# Patient Record
Sex: FEMALE | Race: WHITE | Employment: FULL TIME | URBAN - METROPOLITAN AREA
[De-identification: names, ages, dates, MRNs, and addresses within clinical notes are randomized per-mention and may not be internally consistent; named-entity substitution may affect disease eponyms.]

---

## 2017-01-13 ENCOUNTER — OFFICE VISIT (OUTPATIENT)
Dept: ENDOCRINOLOGY | Age: 52
End: 2017-01-13

## 2017-01-13 VITALS
HEIGHT: 66 IN | HEART RATE: 79 BPM | WEIGHT: 171.2 LBS | BODY MASS INDEX: 27.51 KG/M2 | DIASTOLIC BLOOD PRESSURE: 76 MMHG | SYSTOLIC BLOOD PRESSURE: 110 MMHG

## 2017-01-13 DIAGNOSIS — E89.0 POSTABLATIVE HYPOTHYROIDISM: Primary | ICD-10-CM

## 2017-01-13 NOTE — PROGRESS NOTES
Chief Complaint   Patient presents with    Thyroid Problem     pcp and pharmacy confirmed     History of Present Illness: Peng Abbasi is a 46 y.o. female here for follow up of thyroid. Weight down 3 lbs since last visit in 7/16. Has still been getting hormone pellets with estrogen and testosterone every 3 months and last was on 12/28/16 along with daily progesterone. Has felt better on treatment. Takes the levoxyl first thing in the morning with just water and waits at least 30 minutes before eating. Vitamins are about 3 hours later. Hasn't missed any doses or run out. Some cold intolerance that is unchanged. Still has some constipation if she does not take 3 pills of senokot. Some dry skin that isn't any worse. Some hair loss that may be less. No brittle nails. Current Outpatient Prescriptions   Medication Sig    LACTOBACILLUS ACIDOPHILUS (PROBIOTIC PO) Take  by mouth.  MULTIVITAMIN PO Take  by mouth.  progesterone (PROMETRIUM) 200 mg capsule Take 200 mg by mouth daily.  LEVOXYL 112 mcg tablet Take 1 Tab by mouth Daily (before breakfast).  biotin 2,500 mcg tab Take 5,000 mg by mouth daily.  cholecalciferol, vitamin D3, 2,000 unit tab Take  by mouth daily.  Calcium-Cholecalciferol, D3, 600 mg(1,500mg) -400 unit chew Take  by mouth nightly. Pt takes 1 capsule daily    sennosides 8.6 mg cap Take  by mouth. Takes 3 daily  Indications: Constipation     No current facility-administered medications for this visit. Allergies   Allergen Reactions    Erythromycin Rash    Keflex [Cephalexin] Rash     Review of Systems:  - Cardiovascular: no chest pain  - Neurological: no tremors  - Integumentary: skin is normal    Physical Examination:  Blood pressure 110/76, pulse 79, height 5' 5.5\" (1.664 m), weight 171 lb 3.2 oz (77.7 kg).   - General: pleasant, no distress, good eye contact   - Neck: small thyroid goiter  - Cardiovascular: regular, normal rate, nl s1 and s2, no m/r/g - Integumentary: skin is normal, no edema  - Neurological: reflexes 2+ at biceps, no tremors  - Psychiatric: normal mood and affect    Data Reviewed:   Component      Latest Ref Rng & Units 1/12/2017 1/12/2017           3:35 PM  3:35 PM   TSH      0.450 - 4.500 uIU/mL  1.280   T4, Free      0.82 - 1.77 ng/dL 1.79 (H)        Assessment/Plan:     1. Postablative hypothyroidism: Was diagnosed with hyperthyroidism in 2009 and had a thyroid uptake scan that showed 20% uptake in 10/10 but her levels were just watched without any treatment and then in 2014 was referred to Dr. Preet Tapia and had a thyroid uptake scan that showed 64% uptake in 3/14 and was referred for [facility] and received 16 mCi on 3/28/14 and then developed severe constipation in 5/14 and was started on treatment with levothyroxine. Her dose was increased to as much as 112 mcg daily and was on this dose until Oct 2015 and apparently her last PCP, Dr. Yarely Katz, mani blood that showed this level was too much so she was decreased to 100 mcg daily. TSH was 3.7 and a FT4 of 1.97 in 12/15 and her dose was kept the same. Switched to [employer] on 4/1/16 and her TSH was 4.9 and FT4 was 1.6. I'm concerned some of the variability in her levels is due to generic so switched to Levoxyl at the same dose of 100 mcg daily and TSH was 4.94 in 5/16 so increased her dose to 112 mcg daily but she has been using up the 100 mcg tabs taking 8/wk and TSH 1.29 in 7/16. I kept her dose the same and repeated labs in 6-8 weeks and her TSH was 2.45 in 8/16 and kept this the same and still 1.28 in 1/17 and feels well so will see annually at this point. - cont levoxyl 112 mcg daily  - check TSH and free T4 and total T3 prior to next visit         Patient Instructions   1) Your TSH (thyroid test) is at goal between 0.5-2.0 so your dose of levoxyl looks good.     2) I will mail you a lab slip about 3-4 weeks before your next visit to have your labs repeated 3-4 days prior to your next visit. 3) I will plan on seeing you back in one year but if you feel you need to have your labs checked sooner, please let me know. Follow-up Disposition:  Return in about 1 year (around 1/13/2018). Copy sent to:  Dr. Osmin Pozo via [facility]  Dr. Denisse Jesus follow up: 5/23/17  Component      Latest Ref Rng & Units 5/19/2017 5/19/2017           3:16 PM  3:16 PM   T4, Free      0.82 - 1.77 ng/dL  1.91 (H)   TSH      0.450 - 4.500 uIU/mL 0.320 (L)      Sent her the following message through [employer]:  TSH is a thyroid test.  Your level is 0.32 which is now slightly low and below goal of 0.5-2.0 and your free T4 is slightly high at 1.91 with increasing your dose of levoxyl from 112 to 125 mcg daily. This test goes opposite of your thyroid dose and suggests your dose of levoxyl is now slightly more than you need. To get an in-between dose,  I will decrease your dose to 1 tab on Mon-Sat and 1/2 tab on Sunday only. I updated your med list but did not send  a new prescription to your local pharmacy. As long as you are feeling better on the new dose, you won't need your labs repeated prior to next visit but if you would like them checked again in the next few months, just let me know. Addendum: 8/10/17  Received labs from [id number] [facility] drawn 7/18/17:  - TSH 0.65  - Free T3 2.7 (2.0-4.4)    Sent her the following message through [employer]:  I received the fax you sent with your lab results from last month at [id number] [facility] [place]. Your TSH was back to normal at 0.65 and at goal of 0.5-2.0 so your current dose of levoxyl seems to be working well. To clarify your question that you asked in your fax, I was saying your levels can fluctuate more while on generic levothyroxine and Levoxyl is a brand name drug so I was hoping this would not cause your levels to fluctuate as much.   I still think some of the fluctuation in your levels are due to the differences in your estrogen levels depending on how closer or far away you are from your pellet insertion. It's unclear if your fatigue is from the thyroid or from your changes in estrogen levels. Some patients feel better changing to a natural thyroid hormone called armour thyroid that has both T4 and T3 in one pill. Our bodies convert T4 to T3 which is the active thyroid hormone and the levoxyl only has T4 in it. There are 15% of the population that doesn't convert T4 to T3 as well and you may be one of these people that would feel better having some T3 in your system. If you would like to try armour in place of the levoxyl, let me know. Otherwise, we can just stay on the levoxyl for now since your levels are normal at this time. Addendum: 8/11/17  We had the following e-mail exchange: Thanks for your response. Why don't we give the armour thyroid a try as I have had some good success with this. To get an equivalent dose of armour you will need to take 60 mg tab 1 tab on Mon-Sat and 2 tabs on Sun. This will be ready for  at the pharmacy today. I would like for you to try this for the next 6 weeks and then go and have your labs repeated. I put a lab order in the system so you can go directly to [facility] in 6 weeks and I'll e-mail you with the results to see how this dose is working. I will let you know I will be out of the office starting this evening and won't be back until 8/28/17 so if you write back, I'll be in touch when I get back. My partners will be covering for me while I am away if you need anything while I'm gone. Take care.       ===View-only below this line===      ----- Message -----     From: Windy Valenzuela     Sent: 8/11/2017 10:57 AM EDT       To: Ruben Elise MD  Subject: RE:your fax re lab results from [facility]    Thank you for your reply. I really would like your help in making this decision. When I went to renew they inquired about my thyroid condition.  They stated they could also treat my thyroid, but I told them I did not want to do that. I ask if they would continue to monitor my levels through labs though. They stated if they were to treat me they would not use synthetic drugs, but would use natural thyroid drugs. Therefore, maybe what they are doing is affecting what you are trying to do and vice versa. I am not opposed to trying the natural thyroid drug, if I have your blessing. Also, I am not opposed to finishing the Levoxyl I have. My goal is to feel better. I will await your decision. Lab follow up: 10/14/17  Component      Latest Ref Rng & Units 10/12/2017 10/12/2017 10/12/2017           2:33 PM  2:33 PM  2:33 PM   T4, Free      0.82 - 1.77 ng/dL   0.81 (L)   TSH      0.450 - 4.500 uIU/mL  5.450 (H)    T3, total      71 - 180 ng/dL 115       Sent her the following message through [employer]:  TSH is a thyroid test.  Your level is 5.45 which is slightly high and above goal of 0.5-2.0. This test goes opposite of your thyroid dose and suggests your dose of armour thyroid is not enough. How have you been feeling on the armour? Better, worse, or the same as the levoxyl? I'm happy to increase the armour to get your TSH back down but wasn't sure if we should go back to the levoxyl as previously your level was normal in August on this regimen. Let me know when you have a chance. Addendum: 10/17/17    We had the following e-mail exchange:    It's possible that some of the fluctuation in your thyroid hormone levels are due to fluctuations in your estrogen levels based on when you get your pellet insertions but also due to some variability in the armour thyroid versus the brand levoxyl. Since you are feeling better on the natural thyroid hormone, I will increase your dose to 1 tab 5 days a week and 2 tabs on Sundays and Wednesdays (or Thursdays, you choose) until you come back to see me.   I put another lab order in the system and you should go and repeat your labs the week before your visit in January.  ===View-only below this line===      ----- Message -----     From: Katie Souza     Sent: 10/16/2017 10:03 AM EDT       To: Darrick Garcia MD  Subject: RE:lab results    I have been feeling better, I have been more tired in the last couple weeks, but overall I think the change has been good for me. I would prefer to stay on the [place] and have you adjust my dose. Although I am having a hard time understanding why I will have a normal lab and then all of sudden I have an abnormal lab. I believe if you look at my history as long as I have been taking thyroid drugs in general, this is not new. My levels seem to be high or low more often than normal. Could my thyroid be functioning some and be the cause of not being able to regulate these meds? It just seems to me I will be normal and the next time I have labs my level is off again either high or low and I not sure what could be causing this.

## 2017-01-13 NOTE — MR AVS SNAPSHOT
Visit Information Date & Time Provider Department Dept. Phone Encounter #  
 1/13/2017  2:50 PM Morgan Jorge [facility] [id number] Follow-up Instructions Return in about 1 year (around 1/13/2018). Upcoming Health Maintenance Date Due Hepatitis C Screening 1965 Pneumococcal 19-64 Medium Risk (1 of 1 - PPSV23) 12/9/1982 DTaP/Tdap/Td series (1 - Tdap) 12/9/1984 PAP AKA CERVICAL CYTOLOGY 12/9/1984 FOBT Q 1 YEAR AGE 50-75 12/9/2013 INFLUENZA AGE 9 TO ADULT 8/1/2016 BREAST CANCER SCRN MAMMOGRAM 8/4/2017 Allergies as of 1/13/2017  Review Complete On: 1/13/2017 By: Boston Reed MD  
  
 Severity Noted Reaction Type Reactions Erythromycin High 04/01/2016   Systemic Rash Keflex [Cephalexin] High 04/01/2016   Systemic Rash Current Immunizations  Never Reviewed No immunizations on file. Not reviewed this visit You Were Diagnosed With   
  
 Codes Comments Postablative hypothyroidism    -  Primary ICD-10-CM: E89.0 ICD-9-CM: 244.1 Vitals BP Pulse Height(growth percentile) Weight(growth percentile) BMI OB Status 110/76 (BP 1 Location: Left arm, BP Patient Position: Sitting) 79 5' 5.5\" (1.664 m) 171 lb 3.2 oz (77.7 kg) 28.06 kg/m2 Postmenopausal  
 Smoking Status Current Every Day Smoker Vitals History BMI and BSA Data Body Mass Index Body Surface Area 28.06 kg/m 2 1.89 m 2 Preferred Pharmacy Pharmacy Name Phone [facility] [address] [phone] Your Updated Medication List  
  
   
This list is accurate as of: 1/13/17  4:02 PM.  Always use your most recent med list.  
  
  
  
  
 biotin 2,500 mcg Tab Take 5,000 mg by mouth daily. Calcium-Cholecalciferol (D3) 600 mg(1,500mg) -400 unit Chew Take  by mouth nightly. Pt takes 1 capsule daily cholecalciferol (vitamin D3) 2,000 unit Tab Take  by mouth daily. LEVOXYL 112 mcg tablet Generic drug:  levothyroxine Take 1 Tab by mouth Daily (before breakfast). MULTIVITAMIN PO Take  by mouth. PROBIOTIC PO Take  by mouth.  
  
 progesterone 200 mg capsule Commonly known as:  PROMETRIUM Take 200 mg by mouth daily. sennosides 8.6 mg Cap Take  by mouth. Takes 3 daily  Indications: Constipation Follow-up Instructions Return in about 1 year (around 1/13/2018). Patient Instructions 1) Your TSH (thyroid test) is at goal between 0.5-2.0 so your dose of levoxyl looks good. 2) I will mail you a lab slip about 3-4 weeks before your next visit to have your labs repeated 3-4 days prior to your next visit. 3) I will plan on seeing you back in one year but if you feel you need to have your labs checked sooner, please let me know. Introducing [facility]! Dear Leanne El: Thank you for requesting a [employer] account. Our records indicate that you already have an active [employer] account. You can access your account anytime at [email or web address] Did you know that you can access your hospital and ER discharge instructions at any time in [employer]? You can also review all of your test results from your hospital stay or ER visit. Additional Information If you have questions, please visit the Frequently Asked Questions section of the [employer] website at [email or web address] Remember, [employer] is NOT to be used for urgent needs. For medical emergencies, dial 911. Now available from your iPhone and Android! Please provide this summary of care documentation to your next provider. Your primary care clinician is listed as [facility]. If you have any questions after today's visit, please call [phone].

## 2017-01-13 NOTE — PATIENT INSTRUCTIONS
1) Your TSH (thyroid test) is at goal between 0.5-2.0 so your dose of levoxyl looks good. 2) I will mail you a lab slip about 3-4 weeks before your next visit to have your labs repeated 3-4 days prior to your next visit. 3) I will plan on seeing you back in one year but if you feel you need to have your labs checked sooner, please let me know.

## 2017-03-21 LAB — PAP SMEAR, EXTERNAL: NORMAL

## 2017-03-23 ENCOUNTER — OFFICE VISIT (OUTPATIENT)
Dept: FAMILY MEDICINE CLINIC | Age: 52
End: 2017-03-23

## 2017-03-23 VITALS
TEMPERATURE: 98 F | OXYGEN SATURATION: 97 % | BODY MASS INDEX: 27.86 KG/M2 | DIASTOLIC BLOOD PRESSURE: 61 MMHG | HEART RATE: 61 BPM | SYSTOLIC BLOOD PRESSURE: 97 MMHG | WEIGHT: 170 LBS

## 2017-03-23 DIAGNOSIS — Z11.59 NEED FOR HEPATITIS C SCREENING TEST: ICD-10-CM

## 2017-03-23 DIAGNOSIS — Z23 ENCOUNTER FOR IMMUNIZATION: Primary | ICD-10-CM

## 2017-03-23 DIAGNOSIS — Z00.00 ANNUAL PHYSICAL EXAM: ICD-10-CM

## 2017-03-23 DIAGNOSIS — E03.9 HYPOTHYROIDISM, UNSPECIFIED TYPE: ICD-10-CM

## 2017-03-23 NOTE — PROGRESS NOTES
Daniel Brooke 46 y.o. Daniel Brooke female presents for annual exam.      Screening:   Colon Cancer Screening:  Colonoscopy with Dr. Nicki Adams age 46  Pap: March 2017, [address] [facility] with Dr. Jones Ray:  2015, due in August, scheduled   April 11, 2017  GYN:  Dr. Yareli Story, [address] [facility]    Chronic issues:   has a past medical history of Postablative hypothyroidism (3/28/14) and Seasonal allergic rhinitis. Immunizations:   [place]  TdaP: due  PNA:  due    Diet: no regular diet    Patient states she is extremely fatigued and has cold intolerance. Sees endocrine for post ablative hypothyroid. Takes Levoxyl 112 mcg daily. Allergies   Allergen Reactions    Erythromycin Rash    Keflex [Cephalexin] Rash       Current Outpatient Prescriptions:     LACTOBACILLUS ACIDOPHILUS (PROBIOTIC PO), Take  by mouth., Disp: , Rfl:     MULTIVITAMIN PO, Take  by mouth., Disp: , Rfl:     progesterone (PROMETRIUM) 200 mg capsule, Take 200 mg by mouth daily. , Disp: , Rfl:     LEVOXYL 112 mcg tablet, Take 1 Tab by mouth Daily (before breakfast). , Disp: 30 Tab, Rfl: 11    biotin 2,500 mcg tab, Take 5,000 mg by mouth daily. , Disp: , Rfl:     cholecalciferol, vitamin D3, 2,000 unit tab, Take  by mouth daily. , Disp: , Rfl:     Calcium-Cholecalciferol, D3, 600 mg(1,500mg) -400 unit chew, Take  by mouth nightly. Pt takes 1 capsule daily, Disp: , Rfl:     sennosides 8.6 mg cap, Take  by mouth. Takes 3 daily  Indications: Constipation, Disp: , Rfl:       ROS  Gen: denies fever, chills, fatigue, weight loss, weight gain  Eyes: denies blurry vision  Nose: denies nasal congestion  Mouth: denies sore throat  Resp: denies dypsnea, cough, wheezing  CV: denies chest pain, palpitations, lower extremity edema  Abd: denies nausea, vomiting, diarrhea, endorses constipation. No tarry black stools or blood in stools.   Neuro: denies numbness/tingling  Endo: denies polyuria, polydipsia, endorses cold intolerance      Visit Vitals    BP 97/61    Pulse 61  Temp 98 °F (36.7 °C)    Wt 170 lb (77.1 kg)    SpO2 97%    BMI 27.86 kg/m2     Gen: alert, oriented, no acute distress  Head: NCAT  Eyes: PERRLA, sclera clear, conjunctiva clear  Nose: normal turbinates, no rhinorrhea, no sinus tenderness  Oral: moist mucus membranes, no oral lesions, no pharyngeal inflammation or exudate  Neck: supple, midline trachea, no retractions. Thyroid WNL. Resp: Lungs CTAB, no wheezing, rales or rhonchi  CV: Normal rhythm, normal S1/S2, no m/r/g. No LE edema  Abd: soft, not tender, not distended. No hepatosplenomegaly. Normal bowel sounds. Assessment/ Plan:   Daisy Ferguson was seen today for annual wellness visit. Diagnoses and all orders for this visit:    Encounter for immunization  -     TETANUS, DIPHTHERIA TOXOIDS AND ACELLULAR PERTUSSIS VACCINE (TDAP), IN INDIVIDS. >=7, IM  -     Discontinue: pneumococcal 23-valent (PNEUMOVAX 23) 25 mcg/0.5 mL injection; 0.5 mL by IntraMUSCular route once for 1 dose. -     PNEUMOCOCCAL POLYSACCHARIDE VACCINE, 23-VALENT, ADULT OR IMMUNOSUPPRESSED PT DOSE,    Hypothyroidism, unspecified type  -     TSH 3RD GENERATION  -     T4, FREE    Annual physical exam  -     LIPID PANEL  -     METABOLIC PANEL, COMPREHENSIVE    Need for hepatitis C screening test  -     HEPATITIS C AB    Will get records for [facility] [address] and [facility] to see what kind of implants she is getting for natural hormone replacement. Health Maintenance reviewed - tetanus booster given, Pneumovax given. Advised patient to quit smoking. Verbal and written instructions (see AVS) provided.  Patient expresses understanding of diagnosis and treatment plan. Padmini Gaytan.  Gerhard Goodman MD

## 2017-03-23 NOTE — MR AVS SNAPSHOT
Visit Information Date & Time Provider Department Dept. Phone Encounter #  
 3/23/2017  2:45 PM Concetta Yanez MD [address] [facility] 419 [phone] [id number] Your Appointments 1/12/2018  3:10 PM  
Follow Up with MD [facility] [address] [facility]) Appt Note: 1 year f/u   Hypothyroidism [address] [id number] [address] [facility] Upcoming Health Maintenance Date Due Hepatitis C Screening 1965 Pneumococcal 19-64 Medium Risk (1 of 1 - PPSV23) 12/9/1982 DTaP/Tdap/Td series (1 - Tdap) 12/9/1984 PAP AKA CERVICAL CYTOLOGY 12/9/1984 FOBT Q 1 YEAR AGE 50-75 12/9/2013 BREAST CANCER SCRN MAMMOGRAM 8/4/2017 Allergies as of 3/23/2017  Review Complete On: 3/23/2017 By: Concetta Yanez MD  
  
 Severity Noted Reaction Type Reactions Erythromycin High 04/01/2016   Systemic Rash Keflex [Cephalexin] High 04/01/2016   Systemic Rash Current Immunizations  Never Reviewed Name Date Tdap  Incomplete Not reviewed this visit You Were Diagnosed With   
  
 Codes Comments Encounter for immunization    -  Primary ICD-10-CM: B19 ICD-9-CM: V03.89 Hypothyroidism, unspecified type     ICD-10-CM: E03.9 ICD-9-CM: 244.9 Annual physical exam     ICD-10-CM: Z00.00 ICD-9-CM: V70.0 Need for hepatitis C screening test     ICD-10-CM: Z11.59 
ICD-9-CM: V73.89 Vitals BP Pulse Temp Weight(growth percentile) SpO2 BMI  
 97/61 61 98 °F (36.7 °C) 170 lb (77.1 kg) 97% 27.86 kg/m2 OB Status Smoking Status Postmenopausal Current Every Day Smoker BMI and BSA Data Body Mass Index Body Surface Area  
 27.86 kg/m 2 1.89 m 2 Preferred Pharmacy Pharmacy Name Phone  [facility] [address] [facility] [phone] Your Updated Medication List  
  
   
This list is accurate as of: 3/23/17  3:14 PM.  Always use your most recent med list.  
  
  
  
  
 biotin 2,500 mcg Tab Take 5,000 mg by mouth daily. Calcium-Cholecalciferol (D3) 600 mg(1,500mg) -400 unit Chew Take  by mouth nightly. Pt takes 1 capsule daily  
  
 cholecalciferol (vitamin D3) 2,000 unit Tab Take  by mouth daily. LEVOXYL 112 mcg tablet Generic drug:  levothyroxine Take 1 Tab by mouth Daily (before breakfast). MULTIVITAMIN PO Take  by mouth.  
  
 pneumococcal 23-valent 25 mcg/0.5 mL injection Commonly known as:  PNEUMOVAX 23  
0.5 mL by IntraMUSCular route once for 1 dose. PROBIOTIC PO Take  by mouth.  
  
 progesterone 200 mg capsule Commonly known as:  PROMETRIUM Take 200 mg by mouth daily. sennosides 8.6 mg Cap Take  by mouth. Takes 3 daily  Indications: Constipation Prescriptions Sent to Pharmacy Refills  
 pneumococcal 23-valent (PNEUMOVAX 23) 25 mcg/0.5 mL injection 0 Si.5 mL by IntraMUSCular route once for 1 dose. Class: Normal  
 Pharmacy: Merged with [facility], [address] [place] [address] #: [phone] Route: IntraMUSCular We Performed the Following HEPATITIS C AB [99131 CPT(R)] LIPID PANEL [79515 CPT(R)] METABOLIC PANEL, COMPREHENSIVE [25589 CPT(R)] T4, FREE [id number] CPT(R)] TETANUS, DIPHTHERIA TOXOIDS AND ACELLULAR PERTUSSIS VACCINE (TDAP), IN INDIVIDS. >=7, IM [id number] CPT(R)] TSH 3RD GENERATION [57327 CPT(R)] Patient Instructions Tdap (Tetanus, Diphtheria, Pertussis) Vaccine: What You Need to Know Why get vaccinated? Tetanus, diphtheria, and pertussis are very serious diseases. Tdap vaccine can protect us from these diseases. And Tdap vaccine given to pregnant women can protect  babies against pertussis. Tetanus (lockjaw) is rare in the [facility] today. It causes painful muscle tightening and stiffness, usually all over the body. · It can lead to tightening of muscles in the head and neck so you can't open your mouth, swallow, or sometimes even breathe. Tetanus kills about 1 out of 10 people who are infected even after receiving the best medical care. Diphtheria is also rare in the [place] today. It can cause a thick coating to form in the back of the throat. · It can lead to breathing problems, heart failure, paralysis, and death. Pertussis (whooping cough) causes severe coughing spells, which can cause difficulty breathing, vomiting, and disturbed sleep. · It can also lead to weight loss, incontinence, and rib fractures. Up to 2 in 100 adolescents and 5 in 100 adults with pertussis are hospitalized or have complications, which could include pneumonia or death. These diseases are caused by bacteria. Diphtheria and pertussis are spread from person to person through secretions from coughing or sneezing. Tetanus enters the body through cuts, scratches, or wounds. Before vaccines, as many as 200,000 cases of diphtheria, 200,000 cases of pertussis, and hundreds of cases of tetanus were reported in the [place] each year. Since vaccination began, reports of cases for tetanus and diphtheria have dropped by about 99% and for pertussis by about 80%. Tdap vaccine The Tdap vaccine can protect adolescents and adults from tetanus, diphtheria, and pertussis. One dose of Tdap is routinely given at age 6 or 15. People who did not get Tdap at that age should get it as soon as possible. Tdap is especially important for health care professionals and anyone having close contact with a baby younger than 12 months. Pregnant women should get a dose of Tdap during every pregnancy, to protect the  from pertussis. Infants are most at risk for severe, life-threatening complications from pertussis. Another vaccine, called Td, protects against tetanus and diphtheria, but not pertussis. A Td booster should be given every 10 years. Tdap may be given as one of these boosters if you have never gotten Tdap before. Tdap may also be given after a severe cut or burn to prevent tetanus infection. Your doctor or the person giving you the vaccine can give you more information. Tdap may safely be given at the same time as other vaccines. Some people should not get this vaccine · A person who has ever had a life-threatening allergic reaction after a previous dose of any diphtheria-, tetanus-, or pertussis-containing vaccine, OR has a severe allergy to any part of this vaccine, should not get Tdap vaccine. Tell the person giving the vaccine about any severe allergies. · Anyone who had coma or long repeated seizures within 7 days after a childhood dose of DTP or DTaP, or a previous dose of Tdap, should not get Tdap, unless a cause other than the vaccine was found. They can still get Td. · Talk to your doctor if you: 
¨ Have seizures or another nervous system problem. ¨ Had severe pain or swelling after any vaccine containing diphtheria, tetanus, or pertussis. ¨ Ever had a condition called Guillain-Barré Syndrome (GBS). ¨ Aren't feeling well on the day the shot is scheduled. Risks With any medicine, including vaccines, there is a chance of side effects. These are usually mild and go away on their own. Serious reactions are also possible but are rare. Most people who get Tdap vaccine do not have any problems with it. Mild problems following Tdap 
(Did not interfere with activities) · Pain where the shot was given (about 3 in 4 adolescents or 2 in 3 adults) · Redness or swelling where the shot was given (about 1 person in 5) · Mild fever of at least 100.4°F (up to about 1 in 25 adolescents or 1 in 100 adults) · Headache (about 3 or 4 people in 10) · Tiredness (about 1 person in 3 or 4) · Nausea, vomiting, diarrhea, stomachache (up to 1 in 4 adolescents or 1 in 10 adults) · Chills, sore joints (about 1 person in 10) · Body aches (about 1 person in 3 or 4) · Rash, swollen glands (uncommon) Moderate problems following Tdap (Interfered with activities, but did not require medical attention) · Pain where the shot was given (up to 1 in 5 or 6) · Redness or swelling where the shot was given (up to about 1 in 16 adolescents or 1 in 12 adults) · Fever over 102°F (about 1 in 100 adolescents or 1 in 250 adults) · Headache (about 1 in 7 adolescents or 1 in 10 adults) · Nausea, vomiting, diarrhea, stomachache (up to 1 to 3 people in 100) · Swelling of the entire arm where the shot was given (up to about 1 in 500) Severe problems following Tdap 
(Unable to perform usual activities; required medical attention) · Swelling, severe pain, bleeding and redness in the arm where the shot was given (rare) Problems that could happen after any vaccine: · People sometimes faint after a medical procedure, including vaccination. Sitting or lying down for about 15 minutes can help prevent fainting, and injuries caused by a fall. Tell your doctor if you feel dizzy or have vision changes or ringing in the ears. · Some people get severe pain in the shoulder and have difficulty moving the arm where a shot was given. This happens very rarely. · Any medication can cause a severe allergic reaction. Such reactions from a vaccine are very rare, estimated at fewer than 1 in a million doses, and would happen within a few minutes to a few hours after the vaccination. As with any medicine, there is a very remote chance of a vaccine causing a serious injury or death. The safety of vaccines is always being monitored. For more information, visit: www.cdc.gov/vaccinesafety. What if there is a serious problem? What should I look for?  
· Look for anything that concerns you, such as signs of a severe allergic reaction, very high fever, or unusual behavior. Signs of a severe allergic reaction can include hives, swelling of the face and throat, difficulty breathing, a fast heartbeat, dizziness, and weakness. These would usually start a few minutes to a few hours after the vaccination. What should I do? · If you think it is a severe allergic reaction or other emergency that can't wait, call 9-1-1 or get the person to the nearest hospital. Otherwise, call your doctor. · Afterward, the reaction should be reported to the Vaccine Adverse Event Reporting System (VAERS). Your doctor might file this report, or you can do it yourself through the VAERS web site at www.vaers. [facility].gov, or by calling 9-451.681.2328. VAERS does not give medical advice. The National Vaccine Injury Compensation Program 
The National Vaccine Injury Compensation Program (VICP) is a federal program that was created to compensate people who may have been injured by certain vaccines. Persons who believe they may have been injured by a vaccine can learn about the program and about filing a claim by calling 1-411.166.7313 or visiting the [employer] website at www.[facility].gov/vaccinecompensation. There is a time limit to file a claim for compensation. How can I learn more? · Ask your doctor. He or she can give you the vaccine package insert or suggest other sources of information. · Call your local or state health department. · Contact the Centers for Disease Control and Prevention (CDC): 
¨ Call 5-123.152.5937 (1-800-CDC-INFO) or ¨ Visit CDC's website at www.cdc.gov/vaccines Vaccine Information Statement (Interim) Tdap Vaccine 
(2/24/15) [address] Manpreet Livier [id number] Department of [facility] and [employer] Centers for Disease Control and Prevention Many Vaccine Information Statements are available in [place] and other languages. See www.immunize.org/vis.  
Muchas hojas de información sobre vacunas están disponibles en español y en otros idiomas. Visite www.immunize.org/vis. Care instructions adapted under license by your healthcare professional. If you have questions about a medical condition or this instruction, always ask your healthcare professional. Judi Gresham any warranty or liability for your use of this information. Well Visit, Women 48 to 72: Care Instructions Your Care Instructions Physical exams can help you stay healthy. Your doctor has checked your overall health and may have suggested ways to take good care of yourself. He or she also may have recommended tests. At home, you can help prevent illness with healthy eating, regular exercise, and other steps. Follow-up care is a key part of your treatment and safety. Be sure to make and go to all appointments, and call your doctor if you are having problems. It's also a good idea to know your test results and keep a list of the medicines you take. How can you care for yourself at home? · Reach and stay at a healthy weight. This will lower your risk for many problems, such as obesity, diabetes, heart disease, and high blood pressure. · Get at least 30 minutes of exercise on most days of the week. Walking is a good choice. You also may want to do other activities, such as running, swimming, cycling, or playing tennis or team sports. · Do not smoke. Smoking can make health problems worse. If you need help quitting, talk to your doctor about stop-smoking programs and medicines. These can increase your chances of quitting for good. · Protect your skin from too much sun. When you're outdoors from 10 a.m. to 4 p.m., stay in the shade or cover up with clothing and a hat with a wide brim. Wear sunglasses that block UV rays. Even when it's cloudy, put broad-spectrum sunscreen (SPF 30 or higher) on any exposed skin. · See a dentist one or two times a year for checkups and to have your teeth cleaned. · Wear a seat belt in the car. · Limit alcohol to 1 drink a day. Too much alcohol can cause health problems. Follow your doctor's advice about when to have certain tests. These tests can spot problems early. · Cholesterol. Your doctor will tell you how often to have this done based on your age, family history, or other things that can increase your risk for heart attack and stroke. · Blood pressure. Have your blood pressure checked during a routine doctor visit. Your doctor will tell you how often to check your blood pressure based on your age, your blood pressure results, and other factors. · Mammogram. Ask your doctor how often you should have a mammogram, which is an X-ray of your breasts. A mammogram can spot breast cancer before it can be felt and when it is easiest to treat. · Pap test and pelvic exam. Ask your doctor how often you should have a Pap test. You may not need to have a Pap test as often as you used to. · Vision. Have your eyes checked every year or two or as often as your doctor suggests. Some experts recommend that you have yearly exams for glaucoma and other age-related eye problems starting at age 48. · Hearing. Tell your doctor if you notice any change in your hearing. You can have tests to find out how well you hear. · Diabetes. Ask your doctor whether you should have tests for diabetes. · Colon cancer. You should begin tests for colon cancer at age 48. You may have one of several tests. Your doctor will tell you how often to have tests based on your age and risk. Risks include whether you already had a precancerous polyp removed from your colon or whether your parents, sisters and brothers, or children have had colon cancer. · Thyroid disease. Talk to your doctor about whether to have your thyroid checked as part of a regular physical exam. Women have an increased chance of a thyroid problem. · Osteoporosis. You should begin tests for bone density at age 72.  If you are younger than 72, ask your doctor whether you have factors that may increase your risk for this disease. You may want to have this test before age 72. · Heart attack and stroke risk. At least every 4 to 6 years, you should have your risk for heart attack and stroke assessed. Your doctor uses factors such as your age, blood pressure, cholesterol, and whether you smoke or have diabetes to show what your risk for a heart attack or stroke is over the next 10 years. When should you call for help? Watch closely for changes in your health, and be sure to contact your doctor if you have any problems or symptoms that concern you. Where can you learn more? Go to http://claudio-ajith.info/. Enter M392 in the search box to learn more about \"Well Visit, Women 50 to 72: Care Instructions. \" Current as of: July 19, 2016 Content Version: 11.1 © 5568-0949 [employer]. Care instructions adapted under license by [employer] (which disclaims liability or warranty for this information). If you have questions about a medical condition or this instruction, always ask your healthcare professional. [address] any warranty or liability for your use of this information. Introducing [facility]! Dear Yovana Villanueva: Thank you for requesting a [employer] account. Our records indicate that you already have an active [employer] account. You can access your account anytime at [email or web address] Did you know that you can access your hospital and ER discharge instructions at any time in [employer]? You can also review all of your test results from your hospital stay or ER visit. Additional Information If you have questions, please visit the Frequently Asked Questions section of the [employer] website at [email or web address] Remember, [employer] is NOT to be used for urgent needs. For medical emergencies, dial 911. Now available from your iPhone and Android! Please provide this summary of care documentation to your next provider. Your primary care clinician is listed as Idalmis Dixon. If you have any questions after today's visit, please call [phone].

## 2017-03-23 NOTE — PATIENT INSTRUCTIONS
Tdap (Tetanus, Diphtheria, Pertussis) Vaccine: What You Need to Know  Why get vaccinated? Tetanus, diphtheria, and pertussis are very serious diseases. Tdap vaccine can protect us from these diseases. And Tdap vaccine given to pregnant women can protect  babies against pertussis. Tetanus (lockjaw) is rare in the [facility] today. It causes painful muscle tightening and stiffness, usually all over the body. · It can lead to tightening of muscles in the head and neck so you can't open your mouth, swallow, or sometimes even breathe. Tetanus kills about 1 out of 10 people who are infected even after receiving the best medical care. Diphtheria is also rare in the [place] today. It can cause a thick coating to form in the back of the throat. · It can lead to breathing problems, heart failure, paralysis, and death. Pertussis (whooping cough) causes severe coughing spells, which can cause difficulty breathing, vomiting, and disturbed sleep. · It can also lead to weight loss, incontinence, and rib fractures. Up to 2 in 100 adolescents and 5 in 100 adults with pertussis are hospitalized or have complications, which could include pneumonia or death. These diseases are caused by bacteria. Diphtheria and pertussis are spread from person to person through secretions from coughing or sneezing. Tetanus enters the body through cuts, scratches, or wounds. Before vaccines, as many as 200,000 cases of diphtheria, 200,000 cases of pertussis, and hundreds of cases of tetanus were reported in the [place] each year. Since vaccination began, reports of cases for tetanus and diphtheria have dropped by about 99% and for pertussis by about 80%. Tdap vaccine  The Tdap vaccine can protect adolescents and adults from tetanus, diphtheria, and pertussis. One dose of Tdap is routinely given at age 6 or 15. People who did not get Tdap at that age should get it as soon as possible.   Tdap is especially important for health care professionals and anyone having close contact with a baby younger than 12 months. Pregnant women should get a dose of Tdap during every pregnancy, to protect the  from pertussis. Infants are most at risk for severe, life-threatening complications from pertussis. Another vaccine, called Td, protects against tetanus and diphtheria, but not pertussis. A Td booster should be given every 10 years. Tdap may be given as one of these boosters if you have never gotten Tdap before. Tdap may also be given after a severe cut or burn to prevent tetanus infection. Your doctor or the person giving you the vaccine can give you more information. Tdap may safely be given at the same time as other vaccines. Some people should not get this vaccine  · A person who has ever had a life-threatening allergic reaction after a previous dose of any diphtheria-, tetanus-, or pertussis-containing vaccine, OR has a severe allergy to any part of this vaccine, should not get Tdap vaccine. Tell the person giving the vaccine about any severe allergies. · Anyone who had coma or long repeated seizures within 7 days after a childhood dose of DTP or DTaP, or a previous dose of Tdap, should not get Tdap, unless a cause other than the vaccine was found. They can still get Td. · Talk to your doctor if you:  ¨ Have seizures or another nervous system problem. ¨ Had severe pain or swelling after any vaccine containing diphtheria, tetanus, or pertussis. ¨ Ever had a condition called Guillain-Barré Syndrome (GBS). ¨ Aren't feeling well on the day the shot is scheduled. Risks  With any medicine, including vaccines, there is a chance of side effects. These are usually mild and go away on their own. Serious reactions are also possible but are rare. Most people who get Tdap vaccine do not have any problems with it.   Mild problems following Tdap  (Did not interfere with activities)  · Pain where the shot was given (about 3 in 4 adolescents or 2 in 3 adults)  · Redness or swelling where the shot was given (about 1 person in 5)  · Mild fever of at least 100.4°F (up to about 1 in 25 adolescents or 1 in 100 adults)  · Headache (about 3 or 4 people in 10)  · Tiredness (about 1 person in 3 or 4)  · Nausea, vomiting, diarrhea, stomachache (up to 1 in 4 adolescents or 1 in 10 adults)  · Chills, sore joints (about 1 person in 10)  · Body aches (about 1 person in 3 or 4)  · Rash, swollen glands (uncommon)  Moderate problems following Tdap  (Interfered with activities, but did not require medical attention)  · Pain where the shot was given (up to 1 in 5 or 6)  · Redness or swelling where the shot was given (up to about 1 in 16 adolescents or 1 in 12 adults)  · Fever over 102°F (about 1 in 100 adolescents or 1 in 250 adults)  · Headache (about 1 in 7 adolescents or 1 in 10 adults)  · Nausea, vomiting, diarrhea, stomachache (up to 1 to 3 people in 100)  · Swelling of the entire arm where the shot was given (up to about 1 in 500)  Severe problems following Tdap  (Unable to perform usual activities; required medical attention)  · Swelling, severe pain, bleeding and redness in the arm where the shot was given (rare)  Problems that could happen after any vaccine:  · People sometimes faint after a medical procedure, including vaccination. Sitting or lying down for about 15 minutes can help prevent fainting, and injuries caused by a fall. Tell your doctor if you feel dizzy or have vision changes or ringing in the ears. · Some people get severe pain in the shoulder and have difficulty moving the arm where a shot was given. This happens very rarely. · Any medication can cause a severe allergic reaction. Such reactions from a vaccine are very rare, estimated at fewer than 1 in a million doses, and would happen within a few minutes to a few hours after the vaccination.   As with any medicine, there is a very remote chance of a vaccine causing a serious injury or death. The safety of vaccines is always being monitored. For more information, visit: www.cdc.gov/vaccinesafety. What if there is a serious problem? What should I look for? · Look for anything that concerns you, such as signs of a severe allergic reaction, very high fever, or unusual behavior. Signs of a severe allergic reaction can include hives, swelling of the face and throat, difficulty breathing, a fast heartbeat, dizziness, and weakness. These would usually start a few minutes to a few hours after the vaccination. What should I do? · If you think it is a severe allergic reaction or other emergency that can't wait, call 9-1-1 or get the person to the nearest hospital. Otherwise, call your doctor. · Afterward, the reaction should be reported to the Vaccine Adverse Event Reporting System (VAERS). Your doctor might file this report, or you can do it yourself through the VAERS web site at www.vaers. [facility].gov, or by calling 0-159.659.6262. VAERS does not give medical advice. The National Vaccine Injury Compensation Program  The National Vaccine Injury Compensation Program (VICP) is a federal program that was created to compensate people who may have been injured by certain vaccines. Persons who believe they may have been injured by a vaccine can learn about the program and about filing a claim by calling 7-558.845.5591 or visiting the [employer] website at www.[facility].gov/vaccinecompensation. There is a time limit to file a claim for compensation. How can I learn more? · Ask your doctor. He or she can give you the vaccine package insert or suggest other sources of information. · Call your local or state health department. · Contact the Centers for Disease Control and Prevention (CDC):  ¨ Call 0-761.159.7036 (1-800-CDC-INFO) or  ¨ Visit CDC's website at www.cdc.gov/vaccines  Vaccine Information Statement (Interim)  Tdap Vaccine  (2/24/15)  42 BRENDON Freed [id number]-71  Counts include 234 beds at the [facility] for Disease Control and Prevention  Many Vaccine Information Statements are available in [place]ian and other languages. See www.immunize.org/vis. Muchas hojas de información sobre vacunas están disponibles en español y en otros idiomas. Visite www.immunize.org/vis. Care instructions adapted under license by your healthcare professional. If you have questions about a medical condition or this instruction, always ask your healthcare professional. [place] [address] any warranty or liability for your use of this information. Well Visit, Women 48 to 72: Care Instructions  Your Care Instructions  Physical exams can help you stay healthy. Your doctor has checked your overall health and may have suggested ways to take good care of yourself. He or she also may have recommended tests. At home, you can help prevent illness with healthy eating, regular exercise, and other steps. Follow-up care is a key part of your treatment and safety. Be sure to make and go to all appointments, and call your doctor if you are having problems. It's also a good idea to know your test results and keep a list of the medicines you take. How can you care for yourself at home? · Reach and stay at a healthy weight. This will lower your risk for many problems, such as obesity, diabetes, heart disease, and high blood pressure. · Get at least 30 minutes of exercise on most days of the week. Walking is a good choice. You also may want to do other activities, such as running, swimming, cycling, or playing tennis or team sports. · Do not smoke. Smoking can make health problems worse. If you need help quitting, talk to your doctor about stop-smoking programs and medicines. These can increase your chances of quitting for good. · Protect your skin from too much sun. When you're outdoors from 10 a.m. to 4 p.m., stay in the shade or cover up with clothing and a hat with a wide brim. Wear sunglasses that block UV rays.  Even when it's cloudy, put broad-spectrum sunscreen (SPF 30 or higher) on any exposed skin. · See a dentist one or two times a year for checkups and to have your teeth cleaned. · Wear a seat belt in the car. · Limit alcohol to 1 drink a day. Too much alcohol can cause health problems. Follow your doctor's advice about when to have certain tests. These tests can spot problems early. · Cholesterol. Your doctor will tell you how often to have this done based on your age, family history, or other things that can increase your risk for heart attack and stroke. · Blood pressure. Have your blood pressure checked during a routine doctor visit. Your doctor will tell you how often to check your blood pressure based on your age, your blood pressure results, and other factors. · Mammogram. Ask your doctor how often you should have a mammogram, which is an X-ray of your breasts. A mammogram can spot breast cancer before it can be felt and when it is easiest to treat. · Pap test and pelvic exam. Ask your doctor how often you should have a Pap test. You may not need to have a Pap test as often as you used to. · Vision. Have your eyes checked every year or two or as often as your doctor suggests. Some experts recommend that you have yearly exams for glaucoma and other age-related eye problems starting at age 48. · Hearing. Tell your doctor if you notice any change in your hearing. You can have tests to find out how well you hear. · Diabetes. Ask your doctor whether you should have tests for diabetes. · Colon cancer. You should begin tests for colon cancer at age 48. You may have one of several tests. Your doctor will tell you how often to have tests based on your age and risk. Risks include whether you already had a precancerous polyp removed from your colon or whether your parents, sisters and brothers, or children have had colon cancer. · Thyroid disease.  Talk to your doctor about whether to have your thyroid checked as part of a regular physical exam. Women have an increased chance of a thyroid problem. · Osteoporosis. You should begin tests for bone density at age 72. If you are younger than 72, ask your doctor whether you have factors that may increase your risk for this disease. You may want to have this test before age 72. · Heart attack and stroke risk. At least every 4 to 6 years, you should have your risk for heart attack and stroke assessed. Your doctor uses factors such as your age, blood pressure, cholesterol, and whether you smoke or have diabetes to show what your risk for a heart attack or stroke is over the next 10 years. When should you call for help? Watch closely for changes in your health, and be sure to contact your doctor if you have any problems or symptoms that concern you. Where can you learn more? Go to http://claudio-ajith.info/. Enter O628 in the search box to learn more about \"Well Visit, Women 50 to 72: Care Instructions. \"  Current as of: July 19, 2016  Content Version: 11.1  © 6249-2619 [employer]. Care instructions adapted under license by [employer] (which disclaims liability or warranty for this information). If you have questions about a medical condition or this instruction, always ask your healthcare professional. [place] [address] any warranty or liability for your use of this information.

## 2017-03-23 NOTE — PROGRESS NOTES
Chief Complaint   Patient presents with   [facility] Annual Wellness Visit     fatigue, exhaustion     Binh Bailey

## 2017-03-24 LAB
ALBUMIN SERPL-MCNC: 4.4 G/DL (ref 3.5–5.5)
ALBUMIN/GLOB SERPL: 1.6 {RATIO} (ref 1.2–2.2)
ALP SERPL-CCNC: 69 IU/L (ref 39–117)
ALT SERPL-CCNC: 10 IU/L (ref 0–32)
AST SERPL-CCNC: 14 IU/L (ref 0–40)
BILIRUB SERPL-MCNC: <0.2 MG/DL (ref 0–1.2)
BUN SERPL-MCNC: 12 MG/DL (ref 6–24)
BUN/CREAT SERPL: 15 (ref 9–23)
CALCIUM SERPL-MCNC: 9.7 MG/DL (ref 8.7–10.2)
CHLORIDE SERPL-SCNC: 101 MMOL/L (ref 96–106)
CHOLEST SERPL-MCNC: 194 MG/DL (ref 100–199)
CO2 SERPL-SCNC: 22 MMOL/L (ref 18–29)
CREAT SERPL-MCNC: 0.79 MG/DL (ref 0.57–1)
GLOBULIN SER CALC-MCNC: 2.8 G/DL (ref 1.5–4.5)
GLUCOSE SERPL-MCNC: 78 MG/DL (ref 65–99)
HCV AB S/CO SERPL IA: <0.1 S/CO RATIO (ref 0–0.9)
HDLC SERPL-MCNC: 54 MG/DL
INTERPRETATION, 910389: NORMAL
LDLC SERPL CALC-MCNC: 104 MG/DL (ref 0–99)
POTASSIUM SERPL-SCNC: 4.6 MMOL/L (ref 3.5–5.2)
PROT SERPL-MCNC: 7.2 G/DL (ref 6–8.5)
SODIUM SERPL-SCNC: 141 MMOL/L (ref 134–144)
T4 FREE SERPL-MCNC: 1.69 NG/DL (ref 0.82–1.77)
TRIGL SERPL-MCNC: 179 MG/DL (ref 0–149)
TSH SERPL DL<=0.005 MIU/L-ACNC: 3.54 UIU/ML (ref 0.45–4.5)
VLDLC SERPL CALC-MCNC: 36 MG/DL (ref 5–40)

## 2017-03-27 NOTE — PROGRESS NOTES
Ms. Staci Bajwa,    All your labs were normal, including your thyroid and Hep C labwork. I will be in touch once I receive records from [facility] and [facility].     Thanks,  [facility]

## 2017-03-29 DIAGNOSIS — E89.0 POSTABLATIVE HYPOTHYROIDISM: Primary | ICD-10-CM

## 2017-03-29 RX ORDER — LEVOTHYROXINE SODIUM 125 UG/1
125 TABLET ORAL
Qty: 30 TAB | Refills: 11 | Status: SHIPPED | OUTPATIENT
Start: 2017-03-29 | End: 2017-05-23 | Stop reason: DRUGHIGH

## 2017-03-31 ENCOUNTER — TELEPHONE (OUTPATIENT)
Dept: ENDOCRINOLOGY | Age: 52
End: 2017-03-31

## 2017-03-31 NOTE — TELEPHONE ENCOUNTER
Please call pt to let her know she has an unread message in [address] Let's increase the levoxyl to 125 mcg daily.  This will be ready for  at the pharmacy today.  Feel free to use up the 112 mcg tabs by taking 1 tab on Monday-Saturday and 2 tabs on Sunday.  I will mail you a lab slip and ask that you repeat your labs in 6-8 weeks on the higher dose and I'll contact you to make sure your TSH is coming back down under 2.  Take care.

## 2017-03-31 NOTE — TELEPHONE ENCOUNTER
Called pt regarding their levoxyl dosage change. No answer, left message for pt to call back to office.

## 2017-03-31 NOTE — TELEPHONE ENCOUNTER
Spoke to pt about levoxyl dosage change and also to repeat her labs in 6-8 weeks. Pt verbally understood.

## 2017-03-31 NOTE — TELEPHONE ENCOUNTER
----- Message from Jaqueline Dial sent at 3/31/2017 12:00 PM EDT -----  Regarding: Dr. Saavedra [place]: [phone]  Pt stated that she missed a call from the practice today 03/31/17 and would like for another call back. Her best contact number is [phone].

## 2017-05-23 LAB
T4 FREE SERPL-MCNC: 1.91 NG/DL (ref 0.82–1.77)
TSH SERPL DL<=0.005 MIU/L-ACNC: 0.32 UIU/ML (ref 0.45–4.5)

## 2017-05-23 RX ORDER — LEVOTHYROXINE SODIUM 125 UG/1
TABLET ORAL
Qty: 30 TAB | Refills: 11
Start: 2017-05-23 | End: 2017-08-11 | Stop reason: ALTCHOICE

## 2017-06-15 PROBLEM — N95.1 MENOPAUSAL SYMPTOM: Status: ACTIVE | Noted: 2017-06-15

## 2017-08-11 RX ORDER — LEVOTHYROXINE AND LIOTHYRONINE 38; 9 UG/1; UG/1
TABLET ORAL
Qty: 34 TAB | Refills: 11 | Status: SHIPPED | OUTPATIENT
Start: 2017-08-11 | End: 2017-10-17 | Stop reason: SDUPTHER

## 2017-10-13 LAB
T3 SERPL-MCNC: 115 NG/DL (ref 71–180)
T4 FREE SERPL-MCNC: 0.81 NG/DL (ref 0.82–1.77)
TSH SERPL DL<=0.005 MIU/L-ACNC: 5.45 UIU/ML (ref 0.45–4.5)

## 2017-10-17 RX ORDER — LEVOTHYROXINE AND LIOTHYRONINE 38; 9 UG/1; UG/1
TABLET ORAL
Qty: 38 TAB | Refills: 11 | Status: SHIPPED | OUTPATIENT
Start: 2017-10-17 | End: 2017-11-27 | Stop reason: SINTOL

## 2017-11-21 ENCOUNTER — PATIENT MESSAGE (OUTPATIENT)
Dept: ENDOCRINOLOGY | Age: 52
End: 2017-11-21

## 2017-11-21 NOTE — TELEPHONE ENCOUNTER
From: Windy Valenzuela  To: Ruben Elise MD  Sent: 11/21/2017 11:15 AM EST  Subject: Non-Urgent Medical Question    Good Morning,  I am currently taking the Ione thyroid drug 60mg with (2) tablets on Wed and Sunday. I may be having an allergic reaction to this drug. I am experiencing severe itching and small bumps, mainly in the evening. I thought it may be a soap I was using, but I discontinued that soap over a week ago and I am still having the same symptoms. On the days that I take 2 tablets, I itch at night non-stop. I do have a rash under my arms. Do you think this could be an allergic reaction to the Ione? Please advise at your convenience.

## 2017-11-27 RX ORDER — LEVOTHYROXINE SODIUM 125 UG/1
TABLET ORAL
Qty: 30 TAB | Refills: 11 | Status: SHIPPED | OUTPATIENT
Start: 2017-11-27 | End: 2018-01-12 | Stop reason: SDUPTHER

## 2018-01-06 LAB
T3 SERPL-MCNC: 105 NG/DL (ref 71–180)
T4 FREE SERPL-MCNC: 1.79 NG/DL (ref 0.82–1.77)
TSH SERPL DL<=0.005 MIU/L-ACNC: 1.38 UIU/ML (ref 0.45–4.5)

## 2018-01-12 ENCOUNTER — OFFICE VISIT (OUTPATIENT)
Dept: ENDOCRINOLOGY | Age: 53
End: 2018-01-12

## 2018-01-12 VITALS
HEART RATE: 78 BPM | DIASTOLIC BLOOD PRESSURE: 66 MMHG | WEIGHT: 171.4 LBS | BODY MASS INDEX: 27.55 KG/M2 | HEIGHT: 66 IN | SYSTOLIC BLOOD PRESSURE: 101 MMHG

## 2018-01-12 DIAGNOSIS — E89.0 POSTABLATIVE HYPOTHYROIDISM: Primary | ICD-10-CM

## 2018-01-12 RX ORDER — LEVOTHYROXINE SODIUM 125 UG/1
TABLET ORAL
Qty: 90 TAB | Refills: 3 | Status: SHIPPED | OUTPATIENT
Start: 2018-01-12 | End: 2018-03-01 | Stop reason: SDUPTHER

## 2018-01-12 NOTE — PATIENT INSTRUCTIONS
1) After your next pellet insertion next week, increase your dose of levoxyl to 1 tab 7 days a week. Plan on repeating your labs 6 weeks after the insertion and I'll e-mail you the results. When you are mid-way between being due for your next injection, then decrease the levoxyl back to 1 tab on Mon-Sat and 1/2 tab on Sunday and we will continue this pattern going forward to see if it keeps your levels better regulated taking into account your changes in estrogen. 2) Plan on dropping off the 90 day supply of levoxyl later this month.

## 2018-01-12 NOTE — MR AVS SNAPSHOT
Visit Information Date & Time Provider Department Dept. Phone Encounter #  
 1/12/2018  3:10 PM Pascual Marlow, [address] [place] [address] Diabetes and Endocrinology [phone] [id number] Follow-up Instructions Return in about 6 months (around 7/12/2018). Upcoming Health Maintenance Date Due Influenza Age 5 to Adult 8/1/2017 COLONOSCOPY 1/23/2019 BREAST CANCER SCRN MAMMOGRAM 4/11/2019 PAP AKA CERVICAL CYTOLOGY 3/24/2020 DTaP/Tdap/Td series (2 - Td) 3/23/2027 Allergies as of 1/12/2018  Review Complete On: 1/12/2018 By: Pascual Marlow MD  
  
 Severity Noted Reaction Type Reactions Erythromycin High 04/01/2016   Systemic Rash Keflex [Cephalexin] High 04/01/2016   Systemic Rash [place] Thyroid [Thyroid]  11/27/2017    Rash With 60 mg tabs Current Immunizations  Never Reviewed Name Date Pneumococcal Polysaccharide (PPSV-23) 3/23/2017 Tdap 3/23/2017 Not reviewed this visit You Were Diagnosed With   
  
 Codes Comments Postablative hypothyroidism    -  Primary ICD-10-CM: E89.0 ICD-9-CM: 244.1 Vitals BP Pulse Height(growth percentile) Weight(growth percentile) BMI OB Status 101/66 78 5' 5.5\" (1.664 m) 171 lb 6.4 oz (77.7 kg) 28.09 kg/m2 Postmenopausal  
 Smoking Status Current Every Day Smoker Vitals History BMI and BSA Data Body Mass Index Body Surface Area 28.09 kg/m 2 1.89 m 2 Preferred Pharmacy Pharmacy Name Phone [facility] [address] [phone] Your Updated Medication List  
  
   
This list is accurate as of: 1/12/18  4:16 PM.  Always use your most recent med list.  
  
  
  
  
 biotin 2,500 mcg Tab Take 2,500 mg by mouth daily. Calcium-Cholecalciferol (D3) 600 mg(1,500mg) -400 unit Chew Take  by mouth nightly. Pt takes 1 capsule daily  
  
 cholecalciferol (vitamin D3) 2,000 unit Tab Take  by mouth daily. LEVOXYL 125 mcg tablet Generic drug:  levothyroxine Take 1 tab daily--BRAND MEDICALLY NECESSARY MULTIVITAMIN PO Take  by mouth. PROBIOTIC PO Take  by mouth. PROGESTERONE  
300 mg by Does Not Apply route daily. sennosides 8.6 mg Cap Take  by mouth. Takes 3 daily  Indications: Constipation Prescriptions Printed Refills LEVOXYL 125 mcg tablet 3 Sig: Take 1 tab daily--BRAND MEDICALLY NECESSARY Class: Print We Performed the Following T4, FREE [id number] CPT(R)] T4, FREE [id number] CPT(R)] TSH 3RD GENERATION [61247 CPT(R)] TSH 3RD GENERATION [60259 CPT(R)] Follow-up Instructions Return in about 6 months (around 7/12/2018). Patient Instructions 1) After your next pellet insertion next week, increase your dose of levoxyl to 1 tab 7 days a week. Plan on repeating your labs 6 weeks after the insertion and I'll e-mail you the results. When you are mid-way between being due for your next injection, then decrease the levoxyl back to 1 tab on Mon-Sat and 1/2 tab on Sunday and we will continue this pattern going forward to see if it keeps your levels better regulated taking into account your changes in estrogen. 2) Plan on dropping off the 90 day supply of levoxyl later this month. Introducing [facility]! Dear Tre Arcos: Thank you for requesting a [employer] account. Our records indicate that you already have an active [employer] account. You can access your account anytime at [email or web address] Did you know that you can access your hospital and ER discharge instructions at any time in [employer]? You can also review all of your test results from your hospital stay or ER visit. Additional Information If you have questions, please visit the Frequently Asked Questions section of the [employer] website at [email or web address] Remember, [employer] is NOT to be used for urgent needs. For medical emergencies, dial 911. Now available from your iPhone and Android! Please provide this summary of care documentation to your next provider. Your primary care clinician is listed as Froylan Hernandez. If you have any questions after today's visit, please call [phone].

## 2018-01-12 NOTE — PROGRESS NOTES
Chief Complaint   Patient presents with    Thyroid Problem     pcp and pharmacy confirmed     History of Present Illness: Carmelita Mclaughlin is a 45 y.o. female here for follow up of thyroid. Weight stable since last visit in 1/17. Due for pellet insertion next week. Compliant with levoxyl as below. Current Outpatient Prescriptions   Medication Sig    PROGESTERONE 300 mg by Does Not Apply route daily.  LEVOXYL 125 mcg tablet Take 1 tab on Mon-Sat and 1/2 tab on Sunday--BRAND MEDICALLY NECESSARY--replaces armour thyroid    LACTOBACILLUS ACIDOPHILUS (PROBIOTIC PO) Take  by mouth.  MULTIVITAMIN PO Take  by mouth.  biotin 2,500 mcg tab Take 2,500 mg by mouth daily.  cholecalciferol, vitamin D3, 2,000 unit tab Take  by mouth daily.  Calcium-Cholecalciferol, D3, 600 mg(1,500mg) -400 unit chew Take  by mouth nightly. Pt takes 1 capsule daily    sennosides 8.6 mg cap Take  by mouth. Takes 3 daily  Indications: Constipation     No current facility-administered medications for this visit. Allergies   Allergen Reactions    Erythromycin Rash    Keflex [Cephalexin] Rash    [place] Thyroid [Thyroid] Rash     With 60 mg tabs     Review of Systems:  - Cardiovascular: no chest pain  - Neurological: no tremors  - Integumentary: skin is normal    Physical Examination:  Blood pressure 101/66, pulse 78, height 5' 5.5\" (1.664 m), weight 171 lb 6.4 oz (77.7 kg).   - General: pleasant, no distress, good eye contact   - Neck: no thyromegaly or thyroid bruits  - Cardiovascular: regular, normal rate, nl s1 and s2, no m/r/g   - Respiratory: clear to auscultation bilaterally   - Integumentary: skin is normal, no edema  - Neurological: reflexes 2+ at biceps, no tremors  - Psychiatric: normal mood and affect    Data Reviewed:   Component      Latest Ref Rng & Units 1/5/2018 1/5/2018 1/5/2018           1:17 PM  1:17 PM  1:17 PM   T4, Free      0.82 - 1.77 ng/dL   1.79 (H)   TSH      0.450 - 4.500 uIU/mL  1.380    T3, total      71 - 180 ng/dL 105         Assessment/Plan:     1. Postablative hypothyroidism: Was diagnosed with hyperthyroidism in 2009 and had a thyroid uptake scan that showed 20% uptake in 10/10 but her levels were just watched without any treatment and then in 2014 was referred to Dr. Rani Fitzpatrick and had a thyroid uptake scan that showed 64% uptake in 3/14 and was referred for [facility] and received 16 mCi on 3/28/14 and then developed severe constipation in 5/14 and was started on treatment with levothyroxine. Her dose was increased to as much as 112 mcg daily and was on this dose until Oct 2015 and apparently her last PCP, Dr. Tea Sanchez, mani blood that showed this level was too much so she was decreased to 100 mcg daily. TSH was 3.7 and a FT4 of 1.97 in 12/15 and her dose was kept the same. Switched to Ramesh Klarissa on 4/1/16 and her TSH was 4.9 and FT4 was 1.6. I'm concerned some of the variability in her levels is due to generic so switched to Levoxyl at the same dose of 100 mcg daily and TSH was 4.94 in 5/16 so increased her dose to 112 mcg daily but she has been using up the 100 mcg tabs taking 8/wk and TSH 1.29 in 7/16. I kept her dose the same and repeated labs in 6-8 weeks and her TSH was 2.45 in 8/16 and kept this the same and still 1.28 in 1/17 so kept her dose the same. TSH up to 3.54 at PCP's office and 5.39 at [id number] [facility] in 3/17 so increased to 125 mcg daily and then TSH down to 0.32 in 5/17 so decreased back to 6.5 tabs/week. TSH normal at 0.65 in 8/17 but changed to armour thyroid at 60 mg 8 tabs/week and then TSH up to 5.45 in 10/17 so increased to 9 tabs/week. Started developing more of a rash and itching so stopped the armour in 11/17 and symptoms improved and changed back to levoxyl 125 mcg 6.5 tabs/week and TSH 1.38 in 1/18 and is now due for her next pellet insertion.   I think her levels are fluctuating due to when she has labs drawn in relation to her pellet insertion and needs a dose increase after the insertion and then a dose decrease as she is getting closer to the next insertion so will try 125 mcg 7 tabs/week after the insertion and then decrease to 1/2 tab on Sun when she is midway through her cycle of implantation. We spent 25 minutes of face to face time together and > 50% of the time was spent in counseling on management of her thyroid. - take levoxyl 125 mcg daily after the insertion and then 6.5 tabs/week mid-way through the cycle  - check TSH and free T4 in 6 weeks and prior to next visit           Patient Instructions   1) After your next pellet insertion next week, increase your dose of levoxyl to 1 tab 7 days a week. Plan on repeating your labs 6 weeks after the insertion and I'll e-mail you the results. When you are mid-way between being due for your next injection, then decrease the levoxyl back to 1 tab on Mon-Sat and 1/2 tab on Sunday and we will continue this pattern going forward to see if it keeps your levels better regulated taking into account your changes in estrogen. 2) Plan on dropping off the 90 day supply of levoxyl later this month. Follow-up Disposition:  Return in about 6 months (around 7/12/2018). Copy sent to:  Dr. Frandy Solares via [facility]    Lab follow up: 3/1/18  Component      Latest Ref Rng & Units 2/28/2018 2/28/2018           9:24 AM  9:24 AM   T4, Free      0.82 - 1.77 ng/dL  2.13 (H)   TSH      0.450 - 4.500 uIU/mL 0.099 (L)      Sent her the following message through [facility] [facility] [place]:  TSH is a thyroid test.  Your level is 0.09 which is low and below goal of 0.5-2.0 and your free T4 is high at 2.13. This test goes opposite of your thyroid dose and suggests your dose of levoxyl is more than you need. It appears based on your weight at Dr. Odilia Nunez office 2 weeks ago that you have lost 6 lbs since your visit with me in 1/18.   This is likely why your dose is now too much even though we had gone up on your dose to coincide with the recent pellet insertion. I will decrease your dose back to 1 tab 6 days a week and skip once a week on a day of your choice. I updated your med list but did not send a new prescription to your local pharmacy. Lab follow up: 4/22/18  Component      Latest Ref Rng & Units 4/19/2018 4/19/2018           4:34 PM  4:34 PM   TSH      0.450 - 4.500 uIU/mL  3.470   T4, Free      0.82 - 1.77 ng/dL 1.69      Sent her the following message through [employer]:  TSH is a thyroid test.  Your level is 3.47 which is normal but just above goal of 0.5-2.0. This test goes opposite of your thyroid dose and suggests your dose of levoxyl is now not quite enough. I will increase your dose back to 1 tab on Mon-Sat and 1/2 tab on Sunday as previously your TSH was normal in 1/18 on this dose so this does appear to be the correct dose after all as taking a whole tab on Sunday is too much and skipping Sundays is not enough.

## 2018-02-16 ENCOUNTER — OFFICE VISIT (OUTPATIENT)
Dept: FAMILY MEDICINE CLINIC | Age: 53
End: 2018-02-16

## 2018-02-16 VITALS
SYSTOLIC BLOOD PRESSURE: 123 MMHG | WEIGHT: 165 LBS | RESPIRATION RATE: 14 BRPM | OXYGEN SATURATION: 96 % | TEMPERATURE: 97.6 F | BODY MASS INDEX: 26.52 KG/M2 | HEIGHT: 66 IN | DIASTOLIC BLOOD PRESSURE: 80 MMHG

## 2018-02-16 DIAGNOSIS — M54.50 ACUTE BILATERAL LOW BACK PAIN WITHOUT SCIATICA: ICD-10-CM

## 2018-02-16 DIAGNOSIS — R30.0 DYSURIA: Primary | ICD-10-CM

## 2018-02-16 DIAGNOSIS — R31.9 HEMATURIA, UNSPECIFIED TYPE: ICD-10-CM

## 2018-02-16 DIAGNOSIS — Z23 ENCOUNTER FOR IMMUNIZATION: ICD-10-CM

## 2018-02-16 LAB
BILIRUB UR QL STRIP: NEGATIVE
GLUCOSE UR-MCNC: NEGATIVE MG/DL
KETONES P FAST UR STRIP-MCNC: NEGATIVE MG/DL
PH UR STRIP: 5.5 [PH] (ref 4.6–8)
PROT UR QL STRIP: NEGATIVE
SP GR UR STRIP: 1.02 (ref 1–1.03)
UA UROBILINOGEN AMB POC: NORMAL (ref 0.2–1)
URINALYSIS CLARITY POC: CLEAR
URINALYSIS COLOR POC: YELLOW
URINE BLOOD POC: NORMAL
URINE LEUKOCYTES POC: NEGATIVE
URINE NITRITES POC: NEGATIVE

## 2018-02-16 NOTE — PATIENT INSTRUCTIONS
Vaccine Information Statement    Influenza (Flu) Vaccine (Inactivated or Recombinant): What you need to know    Many Vaccine Information Statements are available in [place]ian and other languages. See www.immunize.org/vis  Hojas de Información Sobre Vacunas están disponibles en Español y en muchos otros idiomas. Visite www.immunize.org/vis    1. Why get vaccinated? Influenza (flu) is a contagious disease that spreads around the [place] every year, usually between October and May. Flu is caused by influenza viruses, and is spread mainly by coughing, sneezing, and close contact. Anyone can get flu. Flu strikes suddenly and can last several days. Symptoms vary by age, but can include:   fever/chills   sore throat   muscle aches   fatigue   cough   headache    runny or stuffy nose    Flu can also lead to pneumonia and blood infections, and cause diarrhea and seizures in children. If you have a medical condition, such as heart or lung disease, flu can make it worse. Flu is more dangerous for some people. Infants and young children, people 72years of age and older, pregnant women, and people with certain health conditions or a weakened immune system are at greatest risk. Each year thousands of people in the [facility] die from flu, and many more are hospitalized. Flu vaccine can:   keep you from getting flu,   make flu less severe if you do get it, and   keep you from spreading flu to your family and other people. 2. Inactivated and recombinant flu vaccines    A dose of flu vaccine is recommended every flu season. Children 6 months through 6years of age may need two doses during the same flu season. Everyone else needs only one dose each flu season.        Some inactivated flu vaccines contain a very small amount of a mercury-based preservative called thimerosal. Studies have not shown thimerosal in vaccines to be harmful, but flu vaccines that do not contain thimerosal are available. There is no live flu virus in flu shots. They cannot cause the flu. There are many flu viruses, and they are always changing. Each year a new flu vaccine is made to protect against three or four viruses that are likely to cause disease in the upcoming flu season. But even when the vaccine doesnt exactly match these viruses, it may still provide some protection    Flu vaccine cannot prevent:   flu that is caused by a virus not covered by the vaccine, or   illnesses that look like flu but are not. It takes about 2 weeks for protection to develop after vaccination, and protection lasts through the flu season. 3. Some people should not get this vaccine    Tell the person who is giving you the vaccine:     If you have any severe, life-threatening allergies. If you ever had a life-threatening allergic reaction after a dose of flu vaccine, or have a severe allergy to any part of this vaccine, you may be advised not to get vaccinated. Most, but not all, types of flu vaccine contain a small amount of egg protein.  If you ever had Guillain-Barré Syndrome (also called GBS). Some people with a history of GBS should not get this vaccine. This should be discussed with your doctor.  If you are not feeling well. It is usually okay to get flu vaccine when you have a mild illness, but you might be asked to come back when you feel better. 4. Risks of a vaccine reaction    With any medicine, including vaccines, there is a chance of reactions. These are usually mild and go away on their own, but serious reactions are also possible. Most people who get a flu shot do not have any problems with it.      Minor problems following a flu shot include:    soreness, redness, or swelling where the shot was given     hoarseness   sore, red or itchy eyes   cough   fever   aches   headache   itching   fatigue  If these problems occur, they usually begin soon after the shot and last 1 or 2 days. More serious problems following a flu shot can include the following:     There may be a small increased risk of Guillain-Barré Syndrome (GBS) after inactivated flu vaccine. This risk has been estimated at 1 or 2 additional cases per million people vaccinated. This is much lower than the risk of severe complications from flu, which can be prevented by flu vaccine.  Young children who get the flu shot along with pneumococcal vaccine (PCV13) and/or DTaP vaccine at the same time might be slightly more likely to have a seizure caused by fever. Ask your doctor for more information. Tell your doctor if a child who is getting flu vaccine has ever had a seizure. Problems that could happen after any injected vaccine:      People sometimes faint after a medical procedure, including vaccination. Sitting or lying down for about 15 minutes can help prevent fainting, and injuries caused by a fall. Tell your doctor if you feel dizzy, or have vision changes or ringing in the ears.  Some people get severe pain in the shoulder and have difficulty moving the arm where a shot was given. This happens very rarely.  Any medication can cause a severe allergic reaction. Such reactions from a vaccine are very rare, estimated at about 1 in a million doses, and would happen within a few minutes to a few hours after the vaccination. As with any medicine, there is a very remote chance of a vaccine causing a serious injury or death. The safety of vaccines is always being monitored. For more information, visit: www.cdc.gov/vaccinesafety/    5. What if there is a serious reaction? What should I look for?  Look for anything that concerns you, such as signs of a severe allergic reaction, very high fever, or unusual behavior.     Signs of a severe allergic reaction can include hives, swelling of the face and throat, difficulty breathing, a fast heartbeat, dizziness, and weakness  usually within a few minutes to a few hours after the vaccination. What should I do?  If you think it is a severe allergic reaction or other emergency that cant wait, call 9-1-1 and get the person to the nearest hospital. Otherwise, call your doctor.  Reactions should be reported to the Vaccine Adverse Event Reporting System (VAERS). Your doctor should file this report, or you can do it yourself through  the VAERS web site at www.vaers. [facility].gov, or by calling 1-447.283.4924. VAERS does not give medical advice. 6. The National Vaccine Injury Compensation Program    The [facility] Vaccine Injury Compensation Program (VICP) is a federal program that was created to compensate people who may have been injured by certain vaccines. Persons who believe they may have been injured by a vaccine can learn about the program and about filing a claim by calling 1-748.693.9794 or visiting the [employer] website at www.[facility].gov/vaccinecompensation. There is a time limit to file a claim for compensation. 7. How can I learn more?  Ask your healthcare provider. He or she can give you the vaccine package insert or suggest other sources of information.  Call your local or state health department.  Contact the Centers for Disease Control and Prevention (CDC):  - Call 5-823.904.6712 (1-800-CDC-INFO) or  - Visit CDCs website at www.cdc.gov/flu    Vaccine Information Statement   Inactivated Influenza Vaccine   8/7/2015  [address] [place] 448LL-20    Department of Health and Human Services  Centers for Disease Control and Prevention    Office Use Only

## 2018-02-16 NOTE — PROGRESS NOTES
Back Pain  Patient with 1 month bilateral low back pain in kidney region, L > R.  States pain is worse first thing in the morning, describes as a tightness. Denies fever, chills, vomiting. Some nausea. Denies unintentional weight loss, fatigue. No urinary symptoms including dysuria, frequency, increase urge, or hematuria. No history of kidney stones, although there is a strong family history. Pain has been constant for the last month-no improvement, no worsening. Has tried a heating pad but nothing else. ROS:  Constitutional: per HPI  Respiratory: negative for cough or dyspnea on exertion  Cardiovascular: negative for chest pain, dyspnea, palpitations  Gastrointestinal: negative for nausea, vomiting, change in bowel habits, diarrhea and abdominal pain  Genitourinary: see HPI    HM  Flu shot:     Allergies   Allergen Reactions    Erythromycin Rash    Keflex [Cephalexin] Rash    [place] Thyroid [Thyroid] Rash     With 60 mg tabs      Social History     Social History    Marital status:      Spouse name: N/A    Number of children: N/A    Years of education: N/A     Social History Main Topics    Smoking status: Current Every Day Smoker     Packs/day: 0.80     Years: 36.00    Smokeless tobacco: Never Used    Alcohol use 0.0 oz/week     0 Standard drinks or equivalent per week      Comment: 1-2 times a year     Drug use: No    Sexual activity: Yes     Partners: Male     Other Topics Concern    None     Social History Narrative    Lives in [place] with  and 22 yo son and 25 yo daughter. Also has a 31 yo daughter. 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 Likes to shop and ready and paolo.         Family History   Problem Relation Age of Onset    Hypertension Mother     Depression Mother     Thyroid Disease Mother      hypothyroidism    Thyroid Disease Father      hypothyroidism    Depression Sister     Diabetes Brother     Cancer Brother      tumor on shoulder blade    No Known Problems Maternal Uncle     Heart Disease Paternal Uncle     Thyroid Disease Sister      hypothyroidism      Past Surgical History:   Procedure Laterality Date    HX LEFT SALPINGO-OOPHORECTOMY      HX REFRACTIVE SURGERY      HX TUBAL LIGATION  1995      Past Medical History:   Diagnosis Date    Postablative hypothyroidism 3/28/14    s/p 16 mCi for Grave's disease    Seasonal allergic rhinitis       Current Outpatient Prescriptions   Medication Sig Dispense Refill    PROGESTERONE 300 mg by Does Not Apply route daily.  LEVOXYL 125 mcg tablet Take 1 tab daily--BRAND MEDICALLY NECESSARY 90 Tab 3    LACTOBACILLUS ACIDOPHILUS (PROBIOTIC PO) Take  by mouth.  MULTIVITAMIN PO Take  by mouth.  biotin 2,500 mcg tab Take 2,500 mg by mouth daily.  cholecalciferol, vitamin D3, 2,000 unit tab Take  by mouth daily.  Calcium-Cholecalciferol, D3, 600 mg(1,500mg) -400 unit chew Take  by mouth nightly. Pt takes 1 capsule daily      sennosides 8.6 mg cap Take  by mouth. Takes 3 daily  Indications: Constipation            Objective:     Vitals:    02/16/18 1232   BP: 123/80   Resp: 14   Temp: 97.6 °F (36.4 °C)   TempSrc: Oral   SpO2: 96%   Weight: 165 lb (74.8 kg)   Height: 5' 5.5\" (1.664 m)       Physical Examination:   Gen - Appears well, in no apparent distress. CV - rrr, no m/r/g  Resp - CTAB  Abd - soft without tenderness, guarding, mass, rebound or organomegaly.  No CVA tenderness     Results for orders placed or performed in visit on 02/16/18   AMB POC URINALYSIS DIP STICK AUTO W/O MICRO   Result Value Ref Range    Color (UA POC) Yellow     Clarity (UA POC) Clear     Glucose (UA POC) Negative Negative    Bilirubin (UA POC) Negative Negative    Ketones (UA POC) Negative Negative    Specific gravity (UA POC) 1.020 1.001 - 1.035    Blood (UA POC) 2+ Negative    pH (UA POC) 5.5 4.6 - 8.0    Protein (UA POC) Negative Negative    Urobilinogen (UA POC) 0.2 mg/dL 0.2 - 1 Nitrites (UA POC) Negative Negative    Leukocyte esterase (UA POC) Negative Negative         Assessment/ Plan:   Diagnoses and all orders for this visit:    1. Dysuria  -     AMB POC URINALYSIS DIP STICK AUTO W/O MICRO  -     CULTURE, URINE    2. Encounter for immunization  -     Influenza virus vaccine (QUADRIVALENT PRES FREE SYRINGE) IM (39720)  -     [place] IMMUNIZ ADMIN,1 SINGLE/COMB VAC/TOXOID     Send urine for culture and see if infection is causing symptoms. If not, will get US v CT to rule out another process. Pt with blood in urine and is a smoker. Discussed treatment plan with patient, who is in agreement. Will call once culture results are back. I have discussed the diagnosis with the patient and the intended plan as seen in the above orders. The patient verbalizes understanding and agreement with the plan. Joe Gaviria MD

## 2018-02-16 NOTE — MR AVS SNAPSHOT
Dusty Suresh 
 
 
 [address] 
[phone] Patient: Rojas Henry MRN: [id number] :1965 Visit Information Date & Time Provider Department Dept. Phone Encounter #  
 2018 12:30 PM Reed Blanchard MD [address] [facility] [phone] [id number] Your Appointments 2018  3:50 PM  
Follow Up with MD Bird [facility] App[facility] Note: 6 month f/u Thyroid [facility] [address] [phone] [address] [place] [address] Road Upcoming Health Maintenance Date Due Influenza Age 5 to Adult 2017 COLONOSCOPY 2019 BREAST CANCER SCRN MAMMOGRAM 2019 PAP AKA CERVICAL CYTOLOGY 3/24/2020 DTaP/Tdap/Td series (2 - Td) 3/23/2027 Allergies as of 2018  Review Complete On: 2018 By: Aristeo Bautista Severity Noted Reaction Type Reactions Erythromycin High 2016   Systemic Rash Keflex [Cephalexin] High 2016   Systemic Rash [place] Thyroid [Thyroid]  2017    Rash With 60 mg tabs Current Immunizations  Never Reviewed Name Date Influenza Vaccine (Quad) PF  Incomplete Pneumococcal Polysaccharide (PPSV-23) 3/23/2017 Tdap 3/23/2017 Not reviewed this visit You Were Diagnosed With   
  
 Codes Comments Dysuria    -  Primary ICD-10-CM: R30.0 ICD-9-CM: 788.1 Encounter for immunization     ICD-10-CM: S31 ICD-9-CM: V03.89 Vitals BP Temp Resp Height(growth percentile) Weight(growth percentile) SpO2  
 123/80 (BP 1 Location: Left arm, BP Patient Position: Sitting) 97.6 °F (36.4 °C) (Oral) 14 5' 5.5\" (1.664 m) 165 lb (74.8 kg) 96% BMI OB Status Smoking Status 27.04 kg/m2 Postmenopausal Current Every Day Smoker BMI and BSA Data Body Mass Index Body Surface Area  
 27.04 kg/m 2 1.86 m 2 Preferred Pharmacy Pharmacy Name Phone [facility] [address] [phone] Your Updated Medication List  
  
   
This list is accurate as of: 2/16/18 12:51 PM.  Always use your most recent med list.  
  
  
  
  
 biotin 2,500 mcg Tab Take 2,500 mg by mouth daily. Calcium-Cholecalciferol (D3) 600 mg(1,500mg) -400 unit Chew Take  by mouth nightly. Pt takes 1 capsule daily  
  
 cholecalciferol (vitamin D3) 2,000 unit Tab Take  by mouth daily. LEVOXYL 125 mcg tablet Generic drug:  levothyroxine Take 1 tab daily--BRAND MEDICALLY NECESSARY MULTIVITAMIN PO Take  by mouth. PROBIOTIC PO Take  by mouth. PROGESTERONE  
300 mg by Does Not Apply route daily. sennosides 8.6 mg Cap Take  by mouth. Takes 3 daily  Indications: Constipation We Performed the Following AMB POC URINALYSIS DIP STICK AUTO W/O MICRO [34480 CPT(R)] CULTURE, URINE [id number] CPT(R)] INFLUENZA VIRUS VAC QUAD,SPLIT,PRESV FREE SYRINGE IM [id number] CPT(R)] [place] IMMUNIZ ADMIN,1 SINGLE/COMB VAC/TOXOID [id number] CPT(R)] Patient Instructions Vaccine Information Statement Influenza (Flu) Vaccine (Inactivated or Recombinant): What you need to know Many Vaccine Information Statements are available in [place]ican and other languages. See www.immunize.org/vis Hojas de Información Sobre Vacunas están disponibles en Español y en muchos otros idiomas. Visite www.immunize.org/vis 1. Why get vaccinated? Influenza (flu) is a contagious disease that spreads around the [place] every year, usually between October and May. Flu is caused by influenza viruses, and is spread mainly by coughing, sneezing, and close contact. Anyone can get flu. Flu strikes suddenly and can last several days. Symptoms vary by age, but can include: 
 fever/chills  sore throat  muscle aches  fatigue  cough  headache  runny or stuffy nose Flu can also lead to pneumonia and blood infections, and cause diarrhea and seizures in children. If you have a medical condition, such as heart or lung disease, flu can make it worse. Flu is more dangerous for some people. Infants and young children, people 72years of age and older, pregnant women, and people with certain health conditions or a weakened immune system are at greatest risk. Each year thousands of people in the [facility] die from flu, and many more are hospitalized. Flu vaccine can: 
 keep you from getting flu, 
 make flu less severe if you do get it, and 
 keep you from spreading flu to your family and other people. 2. Inactivated and recombinant flu vaccines A dose of flu vaccine is recommended every flu season. Children 6 months through 6years of age may need two doses during the same flu season. Everyone else needs only one dose each flu season. Some inactivated flu vaccines contain a very small amount of a mercury-based preservative called thimerosal. Studies have not shown thimerosal in vaccines to be harmful, but flu vaccines that do not contain thimerosal are available. There is no live flu virus in flu shots. They cannot cause the flu. There are many flu viruses, and they are always changing. Each year a new flu vaccine is made to protect against three or four viruses that are likely to cause disease in the upcoming flu season. But even when the vaccine doesnt exactly match these viruses, it may still provide some protection Flu vaccine cannot prevent: 
 flu that is caused by a virus not covered by the vaccine, or 
 illnesses that look like flu but are not. It takes about 2 weeks for protection to develop after vaccination, and protection lasts through the flu season. 3. Some people should not get this vaccine Tell the person who is giving you the vaccine:  If you have any severe, life-threatening allergies. If you ever had a life-threatening allergic reaction after a dose of flu vaccine, or have a severe allergy to any part of this vaccine, you may be advised not to get vaccinated. Most, but not all, types of flu vaccine contain a small amount of egg protein.  If you ever had Guillain-Barré Syndrome (also called GBS). Some people with a history of GBS should not get this vaccine. This should be discussed with your doctor.  If you are not feeling well. It is usually okay to get flu vaccine when you have a mild illness, but you might be asked to come back when you feel better. 4. Risks of a vaccine reaction With any medicine, including vaccines, there is a chance of reactions. These are usually mild and go away on their own, but serious reactions are also possible. Most people who get a flu shot do not have any problems with it. Minor problems following a flu shot include:  
 soreness, redness, or swelling where the shot was given  hoarseness  sore, red or itchy eyes  cough  fever  aches  headache  itching  fatigue If these problems occur, they usually begin soon after the shot and last 1 or 2 days. More serious problems following a flu shot can include the following:  There may be a small increased risk of Guillain-Barré Syndrome (GBS) after inactivated flu vaccine. This risk has been estimated at 1 or 2 additional cases per million people vaccinated. This is much lower than the risk of severe complications from flu, which can be prevented by flu vaccine.  Young children who get the flu shot along with pneumococcal vaccine (PCV13) and/or DTaP vaccine at the same time might be slightly more likely to have a seizure caused by fever. Ask your doctor for more information. Tell your doctor if a child who is getting flu vaccine has ever had a seizure. Problems that could happen after any injected vaccine:  People sometimes faint after a medical procedure, including vaccination. Sitting or lying down for about 15 minutes can help prevent fainting, and injuries caused by a fall. Tell your doctor if you feel dizzy, or have vision changes or ringing in the ears.  Some people get severe pain in the shoulder and have difficulty moving the arm where a shot was given. This happens very rarely.  Any medication can cause a severe allergic reaction. Such reactions from a vaccine are very rare, estimated at about 1 in a million doses, and would happen within a few minutes to a few hours after the vaccination. As with any medicine, there is a very remote chance of a vaccine causing a serious injury or death. The safety of vaccines is always being monitored. For more information, visit: www.cdc.gov/vaccinesafety/ 
 
 
6. The National Vaccine Injury Compensation Program 
 
The [facility] Vaccine Injury Compensation Program (VICP) is a federal program that was created to compensate people who may have been injured by certain vaccines. Persons who believe they may have been injured by a vaccine can learn about the program and about filing a claim by calling 6-519.103.7074 or visiting the [employer] [employer] website at www.[facility].gov/vaccinecompensation. There is a time limit to file a claim for compensation. 7. How can I learn more?  Ask your healthcare provider. He or she can give you the vaccine package insert or suggest other sources of information.  Call your local or state health department.  Contact the Centers for Disease Control and Prevention (CDC): 
- Call 6-822.624.5526 (0-234-DNS-INFO) or 
- Visit CDCs website at www.cdc.gov/flu Vaccine Information Statement Inactivated Influenza Vaccine 8/7/2015 
42 [address] Marion Santos 330DU-30 [facility] and [employer] Centers for Disease Control and Prevention Office Use Only [facility]! Dear Manisha Aldrich: Thank you for requesting a [employer] account. Our records indicate that you already have an active [employer] account. You can access your account anytime at [email or web address] Did you know that you can access your hospital and ER discharge instructions at any time in [employer]? You can also review all of your test results from your hospital stay or ER visit. Additional Information If you have questions, please visit the Frequently Asked Questions section of the [employer] website at [email or web address] Remember, [employer] is NOT to be used for urgent needs. For medical emergencies, dial 911. Now available from your iPhone and Android! Please provide this summary of care documentation to your next provider. Your primary care clinician is listed as Carolyn Cano. If you have any questions after today's visit, please call [phone].

## 2018-02-16 NOTE — PROGRESS NOTES
Chief Complaint   Patient presents with    Back Pain     Patient is here to be seen for possible kidney infection. Influenza Immunization/ administered 2/16/2018 by Tigist Winslow with patients consent. Patient tolerated procedure well. No reactions noted. Vis given.

## 2018-02-17 LAB
APPEARANCE UR: CLEAR
BACTERIA #/AREA URNS HPF: ABNORMAL /[HPF]
BACTERIA UR CULT: NORMAL
BILIRUB UR QL STRIP: NEGATIVE
CASTS URNS QL MICRO: ABNORMAL /LPF
COLOR UR: YELLOW
CRYSTALS URNS MICRO: ABNORMAL
EPI CELLS #/AREA URNS HPF: ABNORMAL /HPF
GLUCOSE UR QL: NEGATIVE
HGB UR QL STRIP: ABNORMAL
KETONES UR QL STRIP: NEGATIVE
LEUKOCYTE ESTERASE UR QL STRIP: NEGATIVE
MICRO URNS: ABNORMAL
MUCOUS THREADS URNS QL MICRO: PRESENT
NITRITE UR QL STRIP: NEGATIVE
PH UR STRIP: 5.5 [PH] (ref 5–7.5)
PROT UR QL STRIP: NEGATIVE
RBC #/AREA URNS HPF: ABNORMAL /HPF
SP GR UR: 1.01 (ref 1–1.03)
UNIDENT CRYS URNS QL MICRO: PRESENT
UROBILINOGEN UR STRIP-MCNC: 0.2 MG/DL (ref 0.2–1)
WBC #/AREA URNS HPF: ABNORMAL /HPF

## 2018-02-20 NOTE — PROGRESS NOTES
Please call patient-her urine did not show an infection. Is she still having back pain? If so, I will order imaging.

## 2018-03-01 LAB
T4 FREE SERPL-MCNC: 2.13 NG/DL (ref 0.82–1.77)
TSH SERPL DL<=0.005 MIU/L-ACNC: 0.1 UIU/ML (ref 0.45–4.5)

## 2018-03-01 RX ORDER — LEVOTHYROXINE SODIUM 125 UG/1
TABLET ORAL
Qty: 90 TAB | Refills: 3
Start: 2018-03-01 | End: 2018-04-22 | Stop reason: SDUPTHER

## 2018-03-19 ENCOUNTER — HOSPITAL ENCOUNTER (OUTPATIENT)
Dept: ULTRASOUND IMAGING | Age: 53
Discharge: HOME | End: 2018-03-19
Attending: FAMILY MEDICINE
Payer: COMMERCIAL

## 2018-03-19 DIAGNOSIS — M54.50 ACUTE BILATERAL LOW BACK PAIN WITHOUT SCIATICA: ICD-10-CM

## 2018-03-19 DIAGNOSIS — R31.9 HEMATURIA, UNSPECIFIED TYPE: ICD-10-CM

## 2018-03-19 DIAGNOSIS — R30.0 DYSURIA: ICD-10-CM

## 2018-03-19 PROCEDURE — 76770 US EXAM ABDO BACK WALL COMP: CPT

## 2018-03-20 NOTE — PROGRESS NOTES
Ms. Grace Pozo, your ultrasound was normal.  Please follow up in office if you continue to have pain. Let me know if you have any questions.       Thanks,  Hilbert Closs

## 2018-03-26 ENCOUNTER — HOSPITAL ENCOUNTER (OUTPATIENT)
Dept: GENERAL RADIOLOGY | Age: 53
Discharge: HOME | End: 2018-03-26
Payer: COMMERCIAL

## 2018-03-26 ENCOUNTER — OFFICE VISIT (OUTPATIENT)
Dept: FAMILY MEDICINE CLINIC | Age: 53
End: 2018-03-26

## 2018-03-26 VITALS
TEMPERATURE: 98.3 F | SYSTOLIC BLOOD PRESSURE: 100 MMHG | DIASTOLIC BLOOD PRESSURE: 67 MMHG | RESPIRATION RATE: 12 BRPM | HEART RATE: 93 BPM | WEIGHT: 163 LBS | BODY MASS INDEX: 26.2 KG/M2 | HEIGHT: 66 IN | OXYGEN SATURATION: 96 %

## 2018-03-26 DIAGNOSIS — R68.89 FLU-LIKE SYMPTOMS: ICD-10-CM

## 2018-03-26 DIAGNOSIS — J18.9 PNEUMONIA DUE TO INFECTIOUS ORGANISM, UNSPECIFIED LATERALITY, UNSPECIFIED PART OF LUNG: Primary | ICD-10-CM

## 2018-03-26 DIAGNOSIS — R50.9 FEVER, UNSPECIFIED FEVER CAUSE: ICD-10-CM

## 2018-03-26 DIAGNOSIS — J18.9 PNEUMONIA DUE TO INFECTIOUS ORGANISM, UNSPECIFIED LATERALITY, UNSPECIFIED PART OF LUNG: ICD-10-CM

## 2018-03-26 LAB
FLUAV+FLUBV AG NOSE QL IA.RAPID: NEGATIVE POS/NEG
FLUAV+FLUBV AG NOSE QL IA.RAPID: NEGATIVE POS/NEG
VALID INTERNAL CONTROL?: YES

## 2018-03-26 PROCEDURE — 71046 X-RAY EXAM CHEST 2 VIEWS: CPT

## 2018-03-26 RX ORDER — BENZONATATE 200 MG/1
200 CAPSULE ORAL
Qty: 21 CAP | Refills: 1 | Status: SHIPPED | OUTPATIENT
Start: 2018-03-26 | End: 2018-04-02

## 2018-03-26 RX ORDER — AMOXICILLIN AND CLAVULANATE POTASSIUM 875; 125 MG/1; MG/1
1 TABLET, FILM COATED ORAL EVERY 12 HOURS
Qty: 20 TAB | Refills: 0 | Status: SHIPPED | OUTPATIENT
Start: 2018-03-26 | End: 2018-04-05

## 2018-03-26 NOTE — MR AVS SNAPSHOT
[address] [facility] [place] 
 
 
 [address] 
[phone] Patient: Philip Lee MRN: [id number] :1965 Visit Information Date & Time Provider Department Dept. Phone Encounter #  
 3/26/2018  3:00 PM Mary Oleary MD [address] [facility] [phone] [id number] Your Appointments 2018  3:50 PM  
Follow Up with MD Zay Lainezmond Diabetes and Endocrinology [address] [place] [address]) Appt Note: 6 month f/u Thyroid [address] [facility] [address] [phone] [address] [facility] Upcoming Health Maintenance Date Due COLONOSCOPY 2019 BREAST CANCER SCRN MAMMOGRAM 2019 PAP AKA CERVICAL CYTOLOGY 3/24/2020 DTaP/Tdap/Td series (2 - Td) 3/23/2027 Allergies as of 3/26/2018  Review Complete On: 2018 By: Romel Childs Severity Noted Reaction Type Reactions Erythromycin High 2016   Systemic Rash Keflex [Cephalexin] High 2016   Systemic Rash [place] Thyroid [Thyroid]  2017    Rash With 60 mg tabs Current Immunizations  Reviewed on 2018 Name Date Influenza Vaccine (Quad) PF 2018 Pneumococcal Polysaccharide (PPSV-23) 3/23/2017 Tdap 3/23/2017 Not reviewed this visit You Were Diagnosed With   
  
 Codes Comments Pneumonia due to infectious organism, unspecified laterality, unspecified part of lung    -  Primary ICD-10-CM: J18.9 ICD-9-CM: 136.9, 484.8 Flu-like symptoms     ICD-10-CM: R68.89 ICD-9-CM: 780.99 Fever, unspecified fever cause     ICD-10-CM: R50.9 ICD-9-CM: 780.60 Vitals BP Pulse Temp Resp Height(growth percentile) Weight(growth percentile) 100/67 (BP 1 Location: Left arm, BP Patient Position: Sitting) 93 98.3 °F (36.8 °C) 12 5' 5.5\" (1.664 m) 163 lb (73.9 kg) SpO2 BMI OB Status Smoking Status 96% 26.71 kg/m2 Postmenopausal Current Every Day Smoker BMI and BSA Data Body Mass Index Body Surface Area  
 26.71 kg/m 2 1.85 m 2 Preferred Pharmacy Pharmacy Name Phone [facility], [address] [phone] Your Updated Medication List  
  
   
This list is accurate as of 3/26/18  3:53 PM.  Always use your most recent med list.  
  
  
  
  
 amoxicillin-clavulanate 875-125 mg per tablet Commonly known as:  AUGMENTIN Take 1 Tab by mouth every twelve (12) hours for 10 days. benzonatate 200 mg capsule Commonly known as:  TESSALON Take 1 Cap by mouth three (3) times daily as needed for Cough for up to 7 days. biotin 2,500 mcg Tab Take 2,500 mg by mouth daily. Calcium-Cholecalciferol (D3) 600 mg(1,500mg) -400 unit Chew Take  by mouth nightly. Pt takes 1 capsule daily  
  
 cholecalciferol (vitamin D3) 2,000 unit Tab Take  by mouth daily. LEVOXYL 125 mcg tablet Generic drug:  levothyroxine Take 1 tab 6 days a week and skip once a week--BRAND MEDICALLY NECESSARY--Dose change 3/1/18--updated med list--did not send prescription to the pharmacy MULTIVITAMIN PO Take  by mouth. PROBIOTIC PO Take  by mouth. PROGESTERONE  
300 mg by Does Not Apply route daily. sennosides 8.6 mg Cap Take  by mouth. Takes 3 daily  Indications: Constipation Prescriptions Printed Refills  
 benzonatate (TESSALON) 200 mg capsule 1 Sig: Take 1 Cap by mouth three (3) times daily as needed for Cough for up to 7 days. Class: Print Route: Oral  
  
Prescriptions Sent to Pharmacy Refills  
 amoxicillin-clavulanate (AUGMENTIN) 875-125 mg per tablet 0 Sig: Take 1 Tab by mouth every twelve (12) hours for 10 days.   
 Class: Normal  
 Pharmacy: [facility], [address] [facility] #: [phone] Route: Oral  
  
We Performed the Following AMB POC JERRY INFLUENZA A/B TEST [56368 CPT(R)] To-Do List   
 03/26/2018 Imaging:  XR CHEST PA LAT Introducing [facility]! Dear Aram Verde: Thank you for requesting a [employer] account. Our records indicate that you already have an active [employer] account. You can access your account anytime at [email or web address] Did you know that you can access your hospital and ER discharge instructions at any time in [employer]? You can also review all of your test results from your hospital stay or ER visit. Additional Information If you have questions, please visit the Frequently Asked Questions section of the [employer] website at [email or web address] Remember, [employer] is NOT to be used for urgent needs. For medical emergencies, dial 911. Now available from your iPhone and Android! Please provide this summary of care documentation to your next provider. Your primary care clinician is listed as Latricia Starks. If you have any questions after today's visit, please call [phone].

## 2018-03-26 NOTE — PROGRESS NOTES
.  Chief Complaint   Patient presents with    Generalized Body Aches    Chills    Nasal Congestion     . Vinny Dc

## 2018-03-27 ENCOUNTER — TELEPHONE (OUTPATIENT)
Dept: FAMILY MEDICINE CLINIC | Age: 53
End: 2018-03-27

## 2018-03-27 NOTE — TELEPHONE ENCOUNTER
Please notify patient that her chest x-ray does not show pneumonia. Hope antibiotic helps.   If antibiotic does not help, this illness is viral and I estimate this illness was closer to 7-10 days

## 2018-03-27 NOTE — PROGRESS NOTES
HPI  Natalia Velasquez is a 45 y.o. female who presents with body aches and chills that started this morning. And the sensation that she was freezing. No cough. Improved a little bit with Advil. No measured temperature. No other specific symptoms. 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 20 Tab 0    benzonatate (TESSALON) 200 mg capsule Take 1 Cap by mouth three (3) times daily as needed for Cough for up to 7 days. 21 Cap 1    LEVOXYL 125 mcg tablet Take 1 tab 6 days a week and skip once a week--BRAND MEDICALLY NECESSARY--Dose change 3/1/18--updated med list--did not send prescription to the pharmacy 90 Tab 3    PROGESTERONE 300 mg by Does Not Apply route daily.  LACTOBACILLUS ACIDOPHILUS (PROBIOTIC PO) Take  by mouth.  MULTIVITAMIN PO Take  by mouth.  biotin 2,500 mcg tab Take 2,500 mg by mouth daily.  cholecalciferol, vitamin D3, 2,000 unit tab Take  by mouth daily.  Calcium-Cholecalciferol, D3, 600 mg(1,500mg) -400 unit chew Take  by mouth nightly. Pt takes 1 capsule daily      sennosides 8.6 mg cap Take  by mouth. Takes 3 daily  Indications: Constipation         Allergies:    Allergies   Allergen Reactions    Erythromycin Rash    Keflex [Cephalexin] Rash    [place] Thyroid [Thyroid] Rash     With 60 mg tabs       Smoker:  History   Smoking Status    Current Every Day Smoker    Packs/day: 0.80    Years: 36.00   Smokeless Tobacco    Never Used       ETOH:   History   Alcohol Use    0.0 oz/week    0 Standard drinks or equivalent per week     Comment: 1-2 times a year        FH:   Family History   Problem Relation Age of Onset    Hypertension Mother     Depression Mother     Thyroid Disease Mother      hypothyroidism    Thyroid Disease Father      hypothyroidism    Depression Sister     Diabetes Brother     Cancer Brother      tumor on shoulder blade    No Known Problems Maternal Uncle     Heart Disease Paternal Uncle     Thyroid Disease Sister      hypothyroidism       ROS:   As listed in HPI. In addition:  Constitutional:   No headache, fever, fatigue, weight loss or weight gain      Cardiac:    No chest pain      Resp:   No cough or shortness of breath      Neuro   No loss of consciousness, dizziness, seizures      Physical Exam:  Blood pressure 100/67, pulse 93, temperature 98.3 °F (36.8 °C), resp. rate 12, height 5' 5.5\" (1.664 m), weight 163 lb (73.9 kg), SpO2 96 %. GEN: No apparent distress. Alert and oriented and responds to all questions appropriately. NEUROLOGIC:  No focal neurologic deficits. Strength and sensation grossly intact. Coordination and gait grossly intact. EXT: Well perfused. No edema. SKIN: No obvious rashes. Also auscultation bilaterally  CV regular rate and murmur  HEENT mild nasal congestion and mild postnasal drip, clear tympanic membranes       Assessment and Plan     Flu negative    Nonspecific illness, likely viral.  Have seen a few other patients with similar illness  Early on this illness, might turn into a [employer] over OTC remedies   Augmentin (allergic/declined other medications)  Chest x-ray personally reviewed, unremarkable. Radiologist notes atelectasis/scarring at the right lung base  Explained that patient could expect symptoms to last 7-10 days, this is an estimate      ICD-10-CM ICD-9-CM    1.  Pneumonia due to infectious organism, unspecified laterality, unspecified part of lung J18.9 136.9 XR CHEST PA LAT     484.8 amoxicillin-clavulanate (AUGMENTIN) 875-125 mg per tablet      benzonatate (TESSALON) 200 mg capsule   2. Flu-like symptoms R68.89 780.99 AMB POC JERRY INFLUENZA A/B TEST   3. Fever, unspecified fever cause R50.9 780.60        AVS given.  Pt expressed understanding of instructions

## 2018-03-28 NOTE — TELEPHONE ENCOUNTER
Ms. Trinidad Donis notified Dr. Ramin Alexander said Please notify patient that her chest x-ray does not show pneumonia. Hope antibiotic helps. If antibiotic does not help, this illness is viral and I estimate this illness was closer to 7-10 days.  She voiced understanding

## 2018-04-20 LAB
T4 FREE SERPL-MCNC: 1.69 NG/DL (ref 0.82–1.77)
TSH SERPL DL<=0.005 MIU/L-ACNC: 3.47 UIU/ML (ref 0.45–4.5)

## 2018-04-22 RX ORDER — LEVOTHYROXINE SODIUM 125 UG/1
TABLET ORAL
Qty: 90 TAB | Refills: 3
Start: 2018-04-22 | End: 2018-11-26 | Stop reason: SDUPTHER

## 2018-07-06 LAB
T4 FREE SERPL-MCNC: 1.96 NG/DL (ref 0.82–1.77)
TSH SERPL DL<=0.005 MIU/L-ACNC: 0.7 UIU/ML (ref 0.45–4.5)

## 2018-07-13 ENCOUNTER — OFFICE VISIT (OUTPATIENT)
Dept: ENDOCRINOLOGY | Age: 53
End: 2018-07-13

## 2018-07-13 VITALS
HEART RATE: 79 BPM | SYSTOLIC BLOOD PRESSURE: 99 MMHG | WEIGHT: 156.2 LBS | BODY MASS INDEX: 25.1 KG/M2 | DIASTOLIC BLOOD PRESSURE: 68 MMHG | HEIGHT: 66 IN

## 2018-07-13 DIAGNOSIS — Z72.0 TOBACCO ABUSE: ICD-10-CM

## 2018-07-13 DIAGNOSIS — E89.0 POSTABLATIVE HYPOTHYROIDISM: Primary | ICD-10-CM

## 2018-07-13 RX ORDER — BUPROPION HYDROCHLORIDE 150 MG/1
150 TABLET ORAL
Qty: 30 TAB | Refills: 11 | Status: SHIPPED | OUTPATIENT
Start: 2018-07-13 | End: 2019-01-11

## 2018-07-13 NOTE — PATIENT INSTRUCTIONS
1) Begin Wellbutrin  mg in the morning with food. Take this for 1 month. If tolerating but feel you need more help with craving to quit smoking, then increase to 2 tabs of wellbutrin,  If still tolerating this higher dose, let me know so I can change the prescription to 300 mg once daily in the morning. Side effects of wellbutrin are nausea, dry mouth, dizziness, heart burn and insomnia. 2) Your TSH is a thyroid test.  Your level is 0.69 which is normal and at goal of 0.5-2.0. This test goes opposite of your thyroid dose and suggests your dose of levoxyl is perfect so I will keep your dose the same. 3) If you lose more weight, please watch out for any symptoms of hyperthyroidism that would suggest your dose is too much such as chest pain, heart racing, anxiety, tremors, trouble sleeping, feeling hot all the time, hair loss, or diarrhea. If they occur, let me know and we'll plan on skipping Sunday completely. 4) I will plan on seeing you back in 6 months but if you feel you need to have labs sooner, please let me know.

## 2018-07-13 NOTE — PROGRESS NOTES
Chief Complaint   Patient presents with    Thyroid Problem     pcp and pharmacy confirmed     History of Present Illness: Bee Alvarez is a 45 y.o. female here for follow up of thyroid. Weight down 15 lbs since last visit in 1/18. Started Weight watchers after last visit. Due for next pellet insertion next week on Monday and previous one was in April. Overall has felt fine on current dose but does have some fatigue and at times and thinks this is from stress and not doing any walking and hopes to restart this. Has been under stress caring for her 79 yo mother at [facility] and helping with her finances. Still smoking about 1 ppd and would like to try and quit. Previously used chantix and quit for 2 years but this made her \"crazy\". Does have some mild anxiety and we discussed using wellbutrin instead and she asked for a prescription and she'll think about filling. Has a 2 yo grandson and a new grandchild on the way in 9/18 that are major reasons to want to quit. Has noticed an occ heart fluttering but nothing regular. Would still like to lose another 5 lbs. Current Outpatient Prescriptions   Medication Sig    Cetirizine (ZYRTEC) 10 mg cap Take  by mouth.  LEVOXYL 125 mcg tablet Take 1 tab 6 days a week and 1/2 tab once a week--BRAND MEDICALLY NECESSARY--Dose change 4/22/18--updated med list--did not send prescription to the pharmacy    PROGESTERONE 300 mg by Does Not Apply route daily.  LACTOBACILLUS ACIDOPHILUS (PROBIOTIC PO) Take  by mouth.  MULTIVITAMIN PO Take  by mouth.  biotin 2,500 mcg tab Take 2,500 mg by mouth daily.  cholecalciferol, vitamin D3, 2,000 unit tab Take  by mouth daily.  Calcium-Cholecalciferol, D3, 600 mg(1,500mg) -400 unit chew Take  by mouth nightly. Pt takes 1 capsule daily    sennosides 8.6 mg cap Take  by mouth. Takes 3 daily  Indications: Constipation     No current facility-administered medications for this visit.       Allergies   Allergen Reactions  Erythromycin Rash    Keflex [Cephalexin] Rash    [place] Thyroid [Thyroid] Rash     With 60 mg tabs     Review of Systems:  - Cardiovascular: no chest pain  - Neurological: no tremors  - Integumentary: skin is normal    Physical Examination:  Blood pressure 99/68, pulse 79, height 5' 5.5\" (1.664 m), weight 156 lb 3.2 oz (70.9 kg). - General: pleasant, no distress, good eye contact   - Neck: small goiter  - Cardiovascular: regular, normal rate, nl s1 and s2, no m/r/g   - Respiratory: clear to auscultation bilaterally   - Integumentary: skin is normal, no edema  - Neurological: reflexes 2+ at biceps, no tremors  - Psychiatric: normal mood and affect    Data Reviewed:   Component      Latest Ref Rng & Units 7/5/2018 7/5/2018          12:10 PM 12:10 PM   T4, Free      0.82 - 1.77 ng/dL  1.96 (H)   TSH      0.450 - 4.500 uIU/mL 0.697        Assessment/Plan:     1. Postablative hypothyroidism: Was diagnosed with hyperthyroidism in 2009 and had a thyroid uptake scan that showed 20% uptake in 10/10 but her levels were just watched without any treatment and then in 2014 was referred to Dr. Azam Peoples and had a thyroid uptake scan that showed 64% uptake in 3/14 and was referred for WALDEN and received 16 mCi on 3/28/14 and then developed severe constipation in 5/14 and was started on treatment with levothyroxine. Her dose was increased to as much as 112 mcg daily and was on this dose until Oct 2015 and apparently her last PCP, Dr. Soumya Colon, mani blood that showed this level was too much so she was decreased to 100 mcg daily. TSH was 3.7 and a FT4 of 1.97 in 12/15 and her dose was kept the same. Switched to [employer] on 4/1/16 and her TSH was 4.9 and FT4 was 1.6.   I'm concerned some of the variability in her levels is due to generic so switched to Levoxyl at the same dose of 100 mcg daily and TSH was 4.94 in 5/16 so increased her dose to 112 mcg daily but she has been using up the 100 mcg tabs taking 8/wk and TSH 1.29 in 7/16. I kept her dose the same and repeated labs in 6-8 weeks and her TSH was 2.45 in 8/16 and kept this the same and still 1.28 in 1/17 so kept her dose the same. TSH up to 3.54 at PCP's office and 5.39 at [id number] [facility] in 3/17 so increased to 125 mcg daily and then TSH down to 0.32 in 5/17 so decreased back to 6.5 tabs/week. TSH normal at 0.65 in 8/17 but changed to armour thyroid at 60 mg 8 tabs/week and then TSH up to 5.45 in 10/17 so increased to 9 tabs/week. Started developing more of a rash and itching so stopped the armour in 11/17 and symptoms improved and changed back to levoxyl 125 mcg 6.5 tabs/week and TSH 1.38 in 1/18 and is now due for her next pellet insertion. I think her levels are fluctuating due to when she has labs drawn in relation to her pellet insertion and needs a dose increase after the insertion and then a dose decrease as she is getting closer to the next insertion so tried 125 mcg 7 tabs/week after the insertion and her TSH was 0.09 and FT4 was 2.13 in 2/18 and decreased to 6 tabs/week. TSH up to 3.47 in 4/18 so went back to 6.5 tabs/week and wt down 15 lbs and TSH 0.69 in 7/18 so will keep her dose the same. If she loses more weight, likely will need a dose decrease  - take levoxyl 125 mcg 6.5 tabs/week  - check TSH and free T4 prior to next visit         2. Tobacco Abuse: Will think about starting wellbutrin and gave her a plan of how to use this as below  - consider wellbutrin  mg daily    We spent 25 minutes of face to face time together and > 50% of the time was spent in counseling regarding management of all the conditions above. Patient Instructions   1) Begin Wellbutrin  mg in the morning with food. Take this for 1 month. If tolerating but feel you need more help with craving to quit smoking, then increase to 2 tabs of wellbutrin,  If still tolerating this higher dose, let me know so I can change the prescription to 300 mg once daily in the morning.   Side effects of wellbutrin are nausea, dry mouth, dizziness, heart burn and insomnia. 2) Your TSH is a thyroid test.  Your level is 0.69 which is normal and at goal of 0.5-2.0. This test goes opposite of your thyroid dose and suggests your dose of levoxyl is perfect so I will keep your dose the same. 3) If you lose more weight, please watch out for any symptoms of hyperthyroidism that would suggest your dose is too much such as chest pain, heart racing, anxiety, tremors, trouble sleeping, feeling hot all the time, hair loss, or diarrhea. If they occur, let me know and we'll plan on skipping Sunday completely. 4) I will plan on seeing you back in 6 months but if you feel you need to have labs sooner, please let me know. Follow-up Disposition:  Return in about 6 months (around 1/13/2019).     Copy sent to:  Dr. Aimee Valdez via [facility]

## 2018-07-13 NOTE — MR AVS SNAPSHOT
[address] [facility] [address] [address] [phone] Patient: Elizabeth Rodriguez MRN: [id number] :1965 Visit Information Date & Time Provider Department Dept. Phone Encounter #  
 2018  3:50 PM Virginia Garland, [address] [place] [address] Diabetes and Endocrinology [phone] Follow-up Instructions Return in about 6 months (around 2019). Upcoming Health Maintenance Date Due Influenza Age 5 to Adult 2018 COLONOSCOPY 2019 BREAST CANCER SCRN MAMMOGRAM 2019 PAP AKA CERVICAL CYTOLOGY 3/24/2020 DTaP/Tdap/Td series (2 - Td) 3/23/2027 Allergies as of 2018  Review Complete On: 2018 By: Virginia Garland MD  
  
 Severity Noted Reaction Type Reactions Erythromycin High 2016   Systemic Rash Keflex [Cephalexin] High 2016   Systemic Rash [place] Thyroid [Thyroid]  2017    Rash With 60 mg tabs Current Immunizations  Reviewed on 2018 Name Date Influenza Vaccine (Quad) PF 2018 Pneumococcal Polysaccharide (PPSV-23) 3/23/2017 Tdap 3/23/2017 Not reviewed this visit You Were Diagnosed With   
  
 Codes Comments Postablative hypothyroidism    -  Primary ICD-10-CM: E89.0 ICD-9-CM: 244.1 Tobacco abuse     ICD-10-CM: Z72.0 ICD-9-CM: 305.1 Vitals BP Pulse Height(growth percentile) Weight(growth percentile) BMI OB Status 99/68 79 5' 5.5\" (1.664 m) 156 lb 3.2 oz (70.9 kg) 25.6 kg/m2 Postmenopausal  
 Smoking Status Current Every Day Smoker Vitals History BMI and BSA Data Body Mass Index Body Surface Area  
 25.6 kg/m 2 1.81 m 2 Preferred Pharmacy Pharmacy Name Phone [facility] [phone] Your Updated Medication List  
  
   
 This list is accurate as of 7/13/18  4:30 PM.  Always use your most recent med list.  
  
  
  
  
 biotin 2,500 mcg Tab Take 2,500 mg by mouth daily. buPROPion  mg tablet Commonly known as:  Todd Spray Take 1 Tab by mouth every morning. Calcium-Cholecalciferol (D3) 600 mg(1,500mg) -400 unit Chew Take  by mouth nightly. Pt takes 1 capsule daily  
  
 cholecalciferol (vitamin D3) 2,000 unit Tab Take  by mouth daily. LEVOXYL 125 mcg tablet Generic drug:  levothyroxine Take 1 tab 6 days a week and 1/2 tab once a week--BRAND MEDICALLY NECESSARY--Dose change 4/22/18--updated med list--did not send prescription to the pharmacy MULTIVITAMIN PO Take  by mouth. PROBIOTIC PO Take  by mouth. PROGESTERONE  
300 mg by Does Not Apply route daily. sennosides 8.6 mg Cap Take  by mouth. Takes 3 daily  Indications: Constipation ZyrTEC 10 mg Cap Generic drug:  Cetirizine Take  by mouth. Prescriptions Printed Refills buPROPion XL (WELLBUTRIN XL) 150 mg tablet 11 Sig: Take 1 Tab by mouth every morning. Class: Print Route: Oral  
  
We Performed the Following T4, FREE [id number] CPT(R)] TSH 3RD GENERATION [84740 CPT(R)] Follow-up Instructions Return in about 6 months (around 1/13/2019). Patient Instructions 1) Begin Wellbutrin  mg in the morning with food. Take this for 1 month. If tolerating but feel you need more help with craving to quit smoking, then increase to 2 tabs of wellbutrin,  If still tolerating this higher dose, let me know so I can change the prescription to 300 mg once daily in the morning. Side effects of wellbutrin are nausea, dry mouth, dizziness, heart burn and insomnia. 2) Your TSH is a thyroid test.  Your level is 0.69 which is normal and at goal of 0.5-2.0.   This test goes opposite of your thyroid dose and suggests your dose of levoxyl is perfect so I will keep your dose the same. 3) If you lose more weight, please watch out for any symptoms of hyperthyroidism that would suggest your dose is too much such as chest pain, heart racing, anxiety, tremors, trouble sleeping, feeling hot all the time, hair loss, or diarrhea. If they occur, let me know and we'll plan on skipping Sunday completely. 4) I will plan on seeing you back in 6 months but if you feel you need to have labs sooner, please let me know. Introducing [facility] SERVICES! Dear Milton Stark: Thank you for requesting a [employer] account. Our records indicate that you already have an active [employer] account. You can access your account anytime at [email or web address] Did you know that you can access your hospital and ER discharge instructions at any time in [employer]? You can also review all of your test results from your hospital stay or ER visit. Additional Information If you have questions, please visit the Frequently Asked Questions section of the [employer] website at [email or web address] Remember, [employer] is NOT to be used for urgent needs. For medical emergencies, dial 911. Now available from your iPhone and Android! Please provide this summary of care documentation to your next provider. Your primary care clinician is listed as [place]. If you have any questions after today's visit, please call [phone].

## 2018-11-26 RX ORDER — LEVOTHYROXINE SODIUM 125 UG/1
TABLET ORAL
Qty: 30 TAB | Refills: 11 | Status: SHIPPED | OUTPATIENT
Start: 2018-11-26 | End: 2019-01-02 | Stop reason: SDUPTHER

## 2018-12-27 LAB
T4 FREE SERPL-MCNC: 1.93 NG/DL (ref 0.82–1.77)
TSH SERPL DL<=0.005 MIU/L-ACNC: 1.56 UIU/ML (ref 0.45–4.5)

## 2019-01-02 RX ORDER — LEVOTHYROXINE SODIUM 125 UG/1
TABLET ORAL
Qty: 30 TAB | Refills: 11 | Status: SHIPPED | OUTPATIENT
Start: 2019-01-02 | End: 2020-01-14

## 2019-01-11 ENCOUNTER — OFFICE VISIT (OUTPATIENT)
Dept: ENDOCRINOLOGY | Age: 54
End: 2019-01-11

## 2019-01-11 VITALS
DIASTOLIC BLOOD PRESSURE: 60 MMHG | HEIGHT: 66 IN | WEIGHT: 159.2 LBS | BODY MASS INDEX: 25.58 KG/M2 | HEART RATE: 80 BPM | SYSTOLIC BLOOD PRESSURE: 105 MMHG

## 2019-01-11 DIAGNOSIS — E89.0 POSTABLATIVE HYPOTHYROIDISM: Primary | ICD-10-CM

## 2019-01-11 DIAGNOSIS — Z72.0 TOBACCO ABUSE: ICD-10-CM

## 2019-01-11 RX ORDER — BUPROPION HYDROCHLORIDE 150 MG/1
300 TABLET ORAL
Qty: 30 TAB | Refills: 11
Start: 2019-01-11 | End: 2019-01-17 | Stop reason: SDUPTHER

## 2019-01-11 NOTE — PROGRESS NOTES
Chief Complaint   Patient presents with    Thyroid Problem     no pcp and pharmacy confirmed     History of Present Illness: Pino Leiva is a 52 y.o. female here for follow up of thyroid. Weight up 3 lbs since last visit in 7/18. Her granddaughter was born in 9/18 and is now almost 1 months old. She has felt her mood is better on the wellbutrin but it hasn't helped too much with smoking and is down to less than 1ppd simply because it's cold outside and she smokes outside. Never tried going up to 2 tabs daily and willing to do so now. Feels well on the levoxyl 6.5 tabs/d and still stays cold but unchanged. Bowels are regular with the senna. No change in skin, hair or nails. Current Outpatient Medications   Medication Sig    LEVOXYL 125 mcg tablet TAKE 1 TABLET BY MOUTH ONCE DAILY MONDAY THROUGH SATURDAY AND TAKE 1/2 TABLET ON SUNDAY--BRAND MEDICALLY NECESSARY--REPLACES ARMOUR THYROID    Cetirizine (ZYRTEC) 10 mg cap Take  by mouth.  buPROPion XL (WELLBUTRIN XL) 150 mg tablet Take 1 Tab by mouth every morning.  PROGESTERONE 300 mg by Does Not Apply route daily.  LACTOBACILLUS ACIDOPHILUS (PROBIOTIC PO) Take  by mouth.  MULTIVITAMIN PO Take  by mouth.  biotin 2,500 mcg tab Take 5,000 mg by mouth daily.  cholecalciferol, vitamin D3, 2,000 unit tab Take  by mouth daily.  Calcium-Cholecalciferol, D3, 600 mg(1,500mg) -400 unit chew Take  by mouth nightly. Pt takes 1 capsule daily    sennosides 8.6 mg cap Take  by mouth. Takes 3 daily  Indications: Constipation     No current facility-administered medications for this visit.       Allergies   Allergen Reactions    Erythromycin Rash    Keflex [Cephalexin] Rash    [place] Thyroid [Thyroid] Rash     With 60 mg tabs     Review of Systems:  - Cardiovascular: no chest pain  - Neurological: no tremors  - Integumentary: skin is normal    Physical Examination:  Blood pressure 105/60, pulse 80, height 5' 5.5\" (1.664 m), weight 159 lb 3.2 oz (72.2 kg). - General: pleasant, no distress, good eye contact   - Neck: small goiter, no carotid bruits  - Cardiovascular: regular, normal rate, nl s1 and s2, no m/r/g   - Respiratory: clear to auscultation bilaterally   - Integumentary: skin is normal, no edema  - Neurological: reflexes 2+ at biceps, no tremors  - Psychiatric: normal mood and affect    Data Reviewed:   Component      Latest Ref Rng & Units 12/26/2018 12/26/2018           3:49 PM  3:49 PM   T4, Free      0.82 - 1.77 ng/dL  1.93 (H)   TSH      0.450 - 4.500 uIU/mL 1.560        Assessment/Plan:     1. Postablative hypothyroidism: Was diagnosed with hyperthyroidism in 2009 and had a thyroid uptake scan that showed 20% uptake in 10/10 but her levels were just watched without any treatment and then in 2014 was referred to Dr. Marcio Olsen and had a thyroid uptake scan that showed 64% uptake in 3/14 and was referred for WALDEN and received 16 mCi on 3/28/14 and then developed severe constipation in 5/14 and was started on treatment with levothyroxine. Her dose was increased to as much as 112 mcg daily and was on this dose until Oct 2015 and apparently her last PCP, Dr. Jacqueline Chiang, mani blood that showed this level was too much so she was decreased to 100 mcg daily. TSH was 3.7 and a FT4 of 1.97 in 12/15 and her dose was kept the same. Switched to [employer] on 4/1/16 and her TSH was 4.9 and FT4 was 1.6. I'm concerned some of the variability in her levels is due to generic so switched to Levoxyl at the same dose of 100 mcg daily and TSH was 4.94 in 5/16 so increased her dose to 112 mcg daily but she has been using up the 100 mcg tabs taking 8/wk and TSH 1.29 in 7/16. I kept her dose the same and repeated labs in 6-8 weeks and her TSH was 2.45 in 8/16 and kept this the same and still 1.28 in 1/17 so kept her dose the same.   TSH up to 3.54 at PCP's office and 5.39 at [address] [facility] in 3/17 so increased to 125 mcg daily and then TSH down to 0.32 in 5/17 so decreased back to 6.5 tabs/week. TSH normal at 0.65 in 8/17 but changed to armour thyroid at 60 mg 8 tabs/week and then TSH up to 5.45 in 10/17 so increased to 9 tabs/week. Started developing more of a rash and itching so stopped the armour in 11/17 and symptoms improved and changed back to levoxyl 125 mcg 6.5 tabs/week and TSH 1.38 in 1/18 and is now due for her next pellet insertion. I think her levels are fluctuating due to when she has labs drawn in relation to her pellet insertion and needs a dose increase after the insertion and then a dose decrease as she is getting closer to the next insertion so tried 125 mcg 7 tabs/week after the insertion and her TSH was 0.09 and FT4 was 2.13 in 2/18 and decreased to 6 tabs/week. TSH up to 3.47 in 4/18 so went back to 6.5 tabs/week and wt down 15 lbs and TSH 0.69 in 7/18 and 1.56 in 12/18 so kept her dose the same. If she loses more weight, likely will need a dose decrease  - take levoxyl 125 mcg 6.5 tabs/week  - check TSH and free T4 prior to next visit         2. Tobacco Abuse:  Has cut back some with wellbutrin and mood is better so will go up on her dose. - increase wellbutrin  mg to 2 tabs daily and if tolerating, rewrite for 300 mg 1 tab daily    Patient Instructions   1) For the next 4-5 days take 2 of the 150 mg wellbutrin tabs and as long as you are feeling well on the higher dose, let me know and I'll write for 300 mg tabs to take 1 tab daily. 2) Your TSH (thyroid test) remains under 2 so keep taking 6.5 tabs/week of Levoxyl. Follow-up Disposition:  Return in about 6 months (around 7/11/2019).     Copy sent to:  None at this time    Lab follow up: 7/8/19  Component      Latest Ref Rng & Units 7/5/2019 7/5/2019           9:32 AM  9:32 AM   T4, Free      0.82 - 1.77 ng/dL  2.00 (H)   TSH      0.450 - 4.500 uIU/mL 1.280      Sent her the following message through [employer]:  Since you are unable to keep your appointment on 7/12/19, I think it's fine to reschedule for 6 months and will have the office call you to reschedule. TSH is a thyroid test.  Your level is 1.28 which is normal and at goal of 0.5-2.0. This test goes opposite of your thyroid dose and suggests your dose of levoxyl is perfect so I will keep your dose the same. I will mail you a lab slip. Please put this in the glove compartment and I will send you a reminder to have your labs drawn prior to the next visit.

## 2019-01-11 NOTE — PATIENT INSTRUCTIONS
1) For the next 4-5 days take 2 of the 150 mg wellbutrin tabs and as long as you are feeling well on the higher dose, let me know and I'll write for 300 mg tabs to take 1 tab daily. 2) Your TSH (thyroid test) remains under 2 so keep taking 6.5 tabs/week of Levoxyl.

## 2019-01-17 RX ORDER — BUPROPION HYDROCHLORIDE 300 MG/1
300 TABLET ORAL
Qty: 30 TAB | Refills: 11 | Status: SHIPPED | OUTPATIENT
Start: 2019-01-17 | End: 2019-12-16 | Stop reason: SDUPTHER

## 2019-07-06 LAB
T4 FREE SERPL-MCNC: 2 NG/DL (ref 0.82–1.77)
TSH SERPL DL<=0.005 MIU/L-ACNC: 1.28 UIU/ML (ref 0.45–4.5)

## 2019-07-08 ENCOUNTER — TELEPHONE (OUTPATIENT)
Dept: ENDOCRINOLOGY | Age: 54
End: 2019-07-08

## 2019-07-08 NOTE — TELEPHONE ENCOUNTER
Please cancel her appt on 7/12/19 and call her to reschedule for 6 months and let her know I sent her a [employer]ell[employer] message with her lab results and will mail her a new lab slip to repeat her labs prior to the visit.

## 2019-07-09 NOTE — TELEPHONE ENCOUNTER
Cancelled patient's 7/12/19 appointment. Spoke to patient this morning and rescheduled her for 1/14/20 at 3:50 PM for her thyroid. Explained to her that you had sent her a elenit Message with her lab results and that you would mail her a new lab slip prior to her January visit.

## 2019-12-16 RX ORDER — BUPROPION HYDROCHLORIDE 300 MG/1
300 TABLET ORAL
Qty: 90 TAB | Refills: 3 | Status: SHIPPED | OUTPATIENT
Start: 2019-12-16 | End: 2020-12-18

## 2020-01-08 LAB
T4 FREE SERPL-MCNC: 1.7 NG/DL (ref 0.82–1.77)
TSH SERPL DL<=0.005 MIU/L-ACNC: 2.27 UIU/ML (ref 0.45–4.5)

## 2020-01-14 ENCOUNTER — OFFICE VISIT (OUTPATIENT)
Dept: ENDOCRINOLOGY | Age: 55
End: 2020-01-14

## 2020-01-14 VITALS
BODY MASS INDEX: 25.58 KG/M2 | HEART RATE: 91 BPM | SYSTOLIC BLOOD PRESSURE: 123 MMHG | HEIGHT: 66 IN | DIASTOLIC BLOOD PRESSURE: 68 MMHG | WEIGHT: 159.2 LBS

## 2020-01-14 DIAGNOSIS — E89.0 POSTABLATIVE HYPOTHYROIDISM: Primary | ICD-10-CM

## 2020-01-14 DIAGNOSIS — Z72.0 TOBACCO ABUSE: ICD-10-CM

## 2020-01-14 RX ORDER — LEVOTHYROXINE SODIUM 125 UG/1
TABLET ORAL
Qty: 90 TAB | Refills: 3 | Status: SHIPPED | OUTPATIENT
Start: 2020-01-14 | End: 2021-01-12

## 2020-01-14 NOTE — PATIENT INSTRUCTIONS
1) TSH is a thyroid test.  Your level is 2.27 which is normal but above goal of 0.5-2.0. This test goes opposite of your thyroid dose and suggests your dose of levoxyl is not quite enough. I will increase your dose back to 1 tab 7 days a week and have sent a new prescription to your local pharmacy. 2) Plan on repeating your TSH through [address] this spring and let me know when it's back so I can review the results. 3) I will plan on seeing you back in one year but if you feel you need to have your labs checked sooner, please let me know.

## 2020-01-14 NOTE — PROGRESS NOTES
Chief Complaint   Patient presents with    Thyroid Problem     no pcp and pharmacy confirmed     History of Present Illness: Kristina Ingram is a 62 y.o. female here for follow up of thyroid. Weight stable since last visit in 1/19. Has overall felt well on the higher dose of wellbutrin but it hasn't made too much difference in her smoking as she still tends to smoke less in the winter and more in the summer but overall quantity is unchanged. No major change to health since last visit. Still taking 6.5 tabs of levoxyl and if she missed, she made up the next day. Still waiting at least 30 min before food and usually is closer to 2 hours and no coffee in the morning until several hours later and the pill is with water. Has had more constipation and still takes 2 senokot per day and once a week uses correctol. Has had some more cheese and nuts in her diet. Stable hair loss. No dry skin or brittle nails. No heat or cold intolerance worse than before. Overall energy is about the same and knows she should exercise more but just hasn't done this. Her last pellet insertion was in November and is due on 2/3/20. Current Outpatient Medications   Medication Sig    buPROPion XL (WELLBUTRIN XL) 300 mg XL tablet Take 1 Tab by mouth every morning. Delete 150 mg tabs from her profile    LEVOXYL 125 mcg tablet TAKE 1 TABLET BY MOUTH ONCE DAILY MONDAY THROUGH SATURDAY AND TAKE 1/2 TABLET ON SUNDAY--BRAND MEDICALLY NECESSARY--REPLACES ARMOUR THYROID    Cetirizine (ZYRTEC) 10 mg cap Take  by mouth.  PROGESTERONE 300 mg by Does Not Apply route daily.  LACTOBACILLUS ACIDOPHILUS (PROBIOTIC PO) Take  by mouth.  MULTIVITAMIN PO Take  by mouth.  biotin 2,500 mcg tab Take 5,000 mg by mouth daily.  cholecalciferol, vitamin D3, 2,000 unit tab Take  by mouth daily.  Calcium-Cholecalciferol, D3, 600 mg(1,500mg) -400 unit chew Take  by mouth nightly.  Pt takes 1 capsule daily    sennosides 8.6 mg cap Take  by mouth. Takes 3 daily  Indications: Constipation     No current facility-administered medications for this visit. Allergies   Allergen Reactions    Erythromycin Rash    Keflex [Cephalexin] Rash    Roscoe Thyroid [Thyroid] Rash     With 60 mg tabs     Review of Systems:  - Cardiovascular: no chest pain  - Neurological: no tremors  - Integumentary: skin is normal    Physical Examination:  Blood pressure 123/68, pulse 91, height 5' 5.5\" (1.664 m), weight 159 lb 3.2 oz (72.2 kg). - General: pleasant, no distress, good eye contact   - Neck: small goiter  - Cardiovascular: regular, normal rate, nl s1 and s2, no m/r/g   - Respiratory: clear to auscultation bilaterally   - Integumentary: skin is normal, no edema  - Neurological: reflexes 2+ at biceps, no tremors  - Psychiatric: normal mood and affect    Data Reviewed:   Component      Latest Ref Rng & Units 1/7/2020 1/7/2020           3:12 PM  3:12 PM   T4, Free      0.82 - 1.77 ng/dL  1.70   TSH      0.450 - 4.500 uIU/mL 2.270        Assessment/Plan:     1. Postablative hypothyroidism: Was diagnosed with hyperthyroidism in 2009 and had a thyroid uptake scan that showed 20% uptake in 10/10 but her levels were just watched without any treatment and then in 2014 was referred to Dr. Rancho Treviño and had a thyroid uptake scan that showed 64% uptake in 3/14 and was referred for [facility] and received 16 mCi on 3/28/14 and then developed severe constipation in 5/14 and was started on treatment with levothyroxine. Her dose was increased to as much as 112 mcg daily and was on this dose until Oct 2015 and apparently her last PCP, Dr. Adryan Hendrickson, mani blood that showed this level was too much so she was decreased to 100 mcg daily. TSH was 3.7 and a FT4 of 1.97 in 12/15 and her dose was kept the same. Switched to [employer] on 4/1/16 and her TSH was 4.9 and FT4 was 1.6.   I'm concerned some of the variability in her levels is due to generic so switched to Levoxyl at the same dose of 100 mcg daily and TSH was 4.94 in 5/16 so increased her dose to 112 mcg daily but she has been using up the 100 mcg tabs taking 8/wk and TSH 1.29 in 7/16. I kept her dose the same and repeated labs in 6-8 weeks and her TSH was 2.45 in 8/16 and kept this the same and still 1.28 in 1/17 so kept her dose the same. TSH up to 3.54 at PCP's office and 5.39 at [id number] [facility] in 3/17 so increased to 125 mcg daily and then TSH down to 0.32 in 5/17 so decreased back to 6.5 tabs/week. TSH normal at 0.65 in 8/17 but changed to armour thyroid at 60 mg 8 tabs/week and then TSH up to 5.45 in 10/17 so increased to 9 tabs/week. Started developing more of a rash and itching so stopped the armour in 11/17 and symptoms improved and changed back to levoxyl 125 mcg 6.5 tabs/week and TSH 1.38 in 1/18 and is now due for her next pellet insertion. I think her levels are fluctuating due to when she has labs drawn in relation to her pellet insertion and needs a dose increase after the insertion and then a dose decrease as she is getting closer to the next insertion so tried 125 mcg 7 tabs/week after the insertion and her TSH was 0.09 and FT4 was 2.13 in 2/18 and decreased to 6 tabs/week. TSH up to 3.47 in 4/18 so went back to 6.5 tabs/week and wt down 15 lbs and TSH 0.69 in 7/18 and 1.56 in 12/18 and 1.28 in 7/19 so kept her dose the same. TSH 2.27 in 1/20 so increased dose back to 1 tab daily especially as this was drawn right prior to needing another pellet insertion. - increase levoxyl 125 mcg back to 1 tab daily  - check TSH and free T4 prior to next visit         2. Tobacco Abuse:  Has cut back some with wellbutrin and mood is better so will stay on her current dose. - cont wellbutrin  mg 1 tab daily      Patient Instructions   1) TSH is a thyroid test.  Your level is 2.27 which is normal but above goal of 0.5-2.0. This test goes opposite of your thyroid dose and suggests your dose of levoxyl is not quite enough.   I will increase your dose back to 1 tab 7 days a week and have sent a new prescription to your local pharmacy. 2) Plan on repeating your TSH through [address] this spring and let me know when it's back so I can review the results. 3) I will plan on seeing you back in one year but if you feel you need to have your labs checked sooner, please let me know. Follow-up and Dispositions    · Return in about 1 year (around 1/14/2021).                Copy sent to:  None at this time

## 2020-11-09 ENCOUNTER — OFFICE VISIT (OUTPATIENT)
Dept: FAMILY MEDICINE CLINIC | Age: 55
End: 2020-11-09
Payer: COMMERCIAL

## 2020-11-09 VITALS
SYSTOLIC BLOOD PRESSURE: 115 MMHG | OXYGEN SATURATION: 98 % | WEIGHT: 162 LBS | RESPIRATION RATE: 16 BRPM | HEIGHT: 66 IN | DIASTOLIC BLOOD PRESSURE: 74 MMHG | BODY MASS INDEX: 26.03 KG/M2 | HEART RATE: 71 BPM | TEMPERATURE: 98 F

## 2020-11-09 DIAGNOSIS — Z23 NEEDS FLU SHOT: Primary | ICD-10-CM

## 2020-11-09 DIAGNOSIS — R21 RASH: ICD-10-CM

## 2020-11-09 DIAGNOSIS — E89.0 POSTABLATIVE HYPOTHYROIDISM: ICD-10-CM

## 2020-11-09 DIAGNOSIS — Z00.00 ROUTINE GENERAL MEDICAL EXAMINATION AT A HEALTH CARE FACILITY: ICD-10-CM

## 2020-11-09 PROCEDURE — 99396 PREV VISIT EST AGE 40-64: CPT

## 2020-11-09 RX ORDER — MOMETASONE FUROATE 1 MG/G
CREAM TOPICAL DAILY
Qty: 45 G | Refills: 1 | Status: SHIPPED | OUTPATIENT
Start: 2020-11-09

## 2020-11-09 NOTE — PATIENT INSTRUCTIONS
Vaccine Information Statement Influenza (Flu) Vaccine (Inactivated or Recombinant): What You Need to Know Many Vaccine Information Statements are available in [place] and other languages. See www.immunize.org/vis Hojas de información sobre vacunas están disponibles en español y en muchos otros idiomas. Visite www.immunize.org/vis 1. Why get vaccinated? Influenza vaccine can prevent influenza (flu). Flu is a contagious disease that spreads around the [facility] every year, usually between October and May. Anyone can get the flu, but it is more dangerous for some people. Infants and young children, people 72years of age and older, pregnant women, and people with certain health conditions or a weakened immune system are at greatest risk of flu complications. Pneumonia, bronchitis, sinus infections and ear infections are examples of flu-related complications. If you have a medical condition, such as heart disease, cancer or diabetes, flu can make it worse. Flu can cause fever and chills, sore throat, muscle aches, fatigue, cough, headache, and runny or stuffy nose. Some people may have vomiting and diarrhea, though this is more common in children than adults. Each year thousands of people in the [facility] die from flu, and many more are hospitalized. Flu vaccine prevents millions of illnesses and flu-related visits to the doctor each year. 2. Influenza vaccines CDC recommends everyone 10months of age and older get vaccinated every flu season. Children 6 months through 6years of age may need 2 doses during a single flu season. Everyone else needs only 1 dose each flu season. It takes about 2 weeks for protection to develop after vaccination. There are many flu viruses, and they are always changing. Each year a new flu vaccine is made to protect against three or four viruses that are likely to cause disease in the upcoming flu season.  Even when the vaccine doesnt exactly match these viruses, it may still provide some protection. Influenza vaccine does not cause flu. Influenza vaccine may be given at the same time as other vaccines. 3. Talk with your health care provider Tell your vaccine provider if the person getting the vaccine: 
 Has had an allergic reaction after a previous dose of influenza vaccine, or has any severe, life-threatening allergies.  Has ever had Guillain-Barré Syndrome (also called GBS). In some cases, your health care provider may decide to postpone influenza vaccination to a future visit. People with minor illnesses, such as a cold, may be vaccinated. People who are moderately or severely ill should usually wait until they recover before getting influenza vaccine. Your health care provider can give you more information. 4. Risks of a reaction  Soreness, redness, and swelling where shot is given, fever, muscle aches, and headache can happen after influenza vaccine.  There may be a very small increased risk of Guillain-Barré Syndrome (GBS) after inactivated influenza vaccine (the flu shot). [facility] children who get the flu shot along with pneumococcal vaccine (PCV13), and/or DTaP vaccine at the same time might be slightly more likely to have a seizure caused by fever. Tell your health care provider if a child who is getting flu vaccine has ever had a seizure. People sometimes faint after medical procedures, including vaccination. Tell your provider if you feel dizzy or have vision changes or ringing in the ears. As with any medicine, there is a very remote chance of a vaccine causing a severe allergic reaction, other serious injury, or death. 5. What if there is a serious problem? An allergic reaction could occur after the vaccinated person leaves the clinic.  If you see signs of a severe allergic reaction (hives, swelling of the face and throat, difficulty breathing, a fast heartbeat, dizziness, or weakness), call 9-1-1 and get the person to the nearest hospital. 
 
For other signs that concern you, call your health care provider. Adverse reactions should be reported to the Vaccine Adverse Event Reporting System (VAERS). Your health care provider will usually file this report, or you can do it yourself. Visit the VAERS website at www.vaers. hhs.gov or call 8-624.959.4745. VAERS is only for reporting reactions, and VAERS staff do not give medical advice. 6. The National Vaccine Injury Compensation Program 
 
The [facility] Vaccine Injury Compensation Program (VICP) is a federal program that was created to compensate people who may have been injured by certain vaccines. Visit the VICP website at www.hrsa.gov/vaccinecompensation or call 5-278.478.6314 to learn about the program and about filing a claim. There is a time limit to file a claim for compensation. 7. How can I learn more?  Ask your health care provider.  Call your local or state health department.  Contact the Centers for Disease Control and Prevention (CDC): 
- Call 3-890.527.9607 (3-123-DNE-INFO) or 
- Visit CDCs influenza website at www.cdc.gov/flu Vaccine Information Statement (Interim) Inactivated Influenza Vaccine 8/15/2019 
[address] BRENDON Gonzalez [id number] Department of Health and [place] Centers for Disease Control and Prevention Office Use Only

## 2020-11-09 NOTE — PROGRESS NOTES
Chief Complaint   Patient presents with    Physical     Pt is here today for a routine physical.     Pt is concerned about easily bruising. Pt reports that her arms and others areas of her body will bruise with minimal impact. Pt was taking BC frequently. Pt was previously advised by her dermatologist that she may need to be screened for lupus, due to a lacy rash on her thigh. Subjective:   62 y.o. female for Well Woman Check. Her gyne and breast care is done elsewhere by her Ob-Gyne physician. Patient Active Problem List   Diagnosis Code    Postablative hypothyroidism E89.0    Menopausal symptom N95.1    Tobacco abuse Z72.0        Lab Results   Component Value Date/Time    Cholesterol, total 194 03/23/2017 12:00 AM    HDL Cholesterol 54 03/23/2017 12:00 AM    LDL, calculated 104 (H) 03/23/2017 12:00 AM    LDL-C, External 120 08/30/2015    Triglyceride 179 (H) 03/23/2017 12:00 AM    CHOL/HDL Ratio 2.9 10/13/2009 05:13 PM        ROS: Feeling generally well. No TIA's or unusual headaches, no dysphagia. No prolonged cough. No dyspnea or chest pain on exertion. No abdominal pain, change in bowel habits, black or bloody stools. No urinary tract symptoms. No new or unusual musculoskeletal symptoms. Specific concerns today: as above. Objective: The patient appears well, alert, oriented x 3, in no distress. Visit Vitals  /74 (BP 1 Location: Left arm, BP Patient Position: Sitting)   Pulse 71   Temp 98 °F (36.7 °C) (Temporal)   Resp 16   Ht 5' 5.5\" (1.664 m)   Wt 162 lb (73.5 kg)   SpO2 98%   BMI 26.55 kg/m²     ENT normal.  Neck supple. No adenopathy or thyromegaly. LUNA. Lungs are clear, good air entry, no wheezes, rhonchi or rales. S1 and S2 normal, no murmurs, regular rate and rhythm. Abdomen soft without tenderness, guarding, mass or organomegaly. Extremities show no edema, normal peripheral pulses. Neurological is normal, no focal findings.   Breast and Pelvic exams are deferred. Assessment/Plan:   Well Woman  increase physical activity, routine labs ordered, call if any problems    ICD-10-CM ICD-9-CM    1. Needs flu shot  Z23 V04.81 INFLUENZA VIRUS VAC QUAD,SPLIT,PRESV FREE SYRINGE IM   2. Postablative hypothyroidism  E89.0 244.1 TSH 3RD GENERATION   3. Routine general medical examination at a health care facility  A10.85 M90.0 METABOLIC PANEL, COMPREHENSIVE      CBC W/O DIFF      LIPID PANEL      HEMOGLOBIN A1C WITH EAG      TSH 3RD GENERATION      VITAMIN D, 25 HYDROXY   4. Rash  R21 782.1 ANTINUCLEAR ANTIBODIES, IFA     Encounter Diagnoses   Name Primary?     Needs flu shot Yes    Postablative hypothyroidism     Routine general medical examination at a health care facility     Rash      Orders Placed This Encounter    Influenza Virus Vaccine QUAD, PF Syr 6 Months + (Flulaval, Fluzone, Fluarix 58500)    METABOLIC PANEL, COMPREHENSIVE    CBC W/O DIFF    LIPID PANEL    HEMOGLOBIN A1C WITH EAG    TSH 3RD GENERATION    VITAMIN D, 25 HYDROXY    ANTINUCLEAR ANTIBODIES, IFA

## 2020-11-09 NOTE — PROGRESS NOTES
Chief Complaint   Patient presents with    Physical     Health Maintenance reviewed     1. Have you been to the ER, urgent care clinic since your last visit? Hospitalized since your last visit? No     2. Have you seen or consulted any other health care providers outside of the [address] [place] [address] since your last visit? Include any pap smears or colon screening.   No

## 2020-11-10 PROCEDURE — 90686 IIV4 VACC NO PRSV 0.5 ML IM: CPT

## 2020-11-15 LAB
25(OH)D3+25(OH)D2 SERPL-MCNC: 52.3 NG/ML (ref 30–100)
ALBUMIN SERPL-MCNC: 4.5 G/DL (ref 3.8–4.9)
ALBUMIN/GLOB SERPL: 2 {RATIO} (ref 1.2–2.2)
ALP SERPL-CCNC: 77 IU/L (ref 39–117)
ALT SERPL-CCNC: 14 IU/L (ref 0–32)
ANA TITR SER IF: NEGATIVE {TITER}
AST SERPL-CCNC: 19 IU/L (ref 0–40)
BILIRUB SERPL-MCNC: <0.2 MG/DL (ref 0–1.2)
BUN SERPL-MCNC: 18 MG/DL (ref 6–24)
BUN/CREAT SERPL: 20 (ref 9–23)
CALCIUM SERPL-MCNC: 9.6 MG/DL (ref 8.7–10.2)
CHLORIDE SERPL-SCNC: 104 MMOL/L (ref 96–106)
CHOLEST SERPL-MCNC: 186 MG/DL (ref 100–199)
CO2 SERPL-SCNC: 21 MMOL/L (ref 20–29)
CREAT SERPL-MCNC: 0.92 MG/DL (ref 0.57–1)
ERYTHROCYTE [DISTWIDTH] IN BLOOD BY AUTOMATED COUNT: 11.6 % (ref 11.7–15.4)
EST. AVERAGE GLUCOSE BLD GHB EST-MCNC: 100 MG/DL
GLOBULIN SER CALC-MCNC: 2.3 G/DL (ref 1.5–4.5)
GLUCOSE SERPL-MCNC: 83 MG/DL (ref 65–99)
HBA1C MFR BLD: 5.1 % (ref 4.8–5.6)
HCT VFR BLD AUTO: 42.7 % (ref 34–46.6)
HDLC SERPL-MCNC: 65 MG/DL
HGB BLD-MCNC: 14.6 G/DL (ref 11.1–15.9)
INTERPRETATION, 910389: NORMAL
LDLC SERPL CALC-MCNC: 95 MG/DL (ref 0–99)
MCH RBC QN AUTO: 33.6 PG (ref 26.6–33)
MCHC RBC AUTO-ENTMCNC: 34.2 G/DL (ref 31.5–35.7)
MCV RBC AUTO: 98 FL (ref 79–97)
MORPHOLOGY BLD-IMP: ABNORMAL
PLATELET # BLD AUTO: ABNORMAL X10E3/UL
POTASSIUM SERPL-SCNC: 4.4 MMOL/L (ref 3.5–5.2)
PROT SERPL-MCNC: 6.8 G/DL (ref 6–8.5)
RBC # BLD AUTO: 4.35 X10E6/UL (ref 3.77–5.28)
SODIUM SERPL-SCNC: 142 MMOL/L (ref 134–144)
TRIGL SERPL-MCNC: 150 MG/DL (ref 0–149)
TSH SERPL DL<=0.005 MIU/L-ACNC: 1.75 UIU/ML (ref 0.45–4.5)
VLDLC SERPL CALC-MCNC: 26 MG/DL (ref 5–40)
WBC # BLD AUTO: 9.8 X10E3/UL (ref 3.4–10.8)

## 2020-11-16 NOTE — PROGRESS NOTES
CBC needs to be redrawn to recheck platelets. All other labs are within normal limits. A message has been sent in [employer] and the lab work released to the patient.

## 2020-12-18 RX ORDER — BUPROPION HYDROCHLORIDE 300 MG/1
TABLET ORAL
Qty: 90 TAB | Refills: 3 | Status: SHIPPED | OUTPATIENT
Start: 2020-12-18 | End: 2021-12-17

## 2021-01-02 DIAGNOSIS — Z72.0 TOBACCO ABUSE: ICD-10-CM

## 2021-01-02 DIAGNOSIS — E89.0 POSTABLATIVE HYPOTHYROIDISM: ICD-10-CM

## 2021-01-06 LAB
T4 FREE SERPL-MCNC: 1.57 NG/DL (ref 0.82–1.77)
TSH SERPL DL<=0.005 MIU/L-ACNC: 3.31 UIU/ML (ref 0.45–4.5)

## 2021-01-12 ENCOUNTER — VIRTUAL VISIT (OUTPATIENT)
Dept: ENDOCRINOLOGY | Age: 56
End: 2021-01-12
Payer: COMMERCIAL

## 2021-01-12 DIAGNOSIS — Z72.0 TOBACCO ABUSE: ICD-10-CM

## 2021-01-12 DIAGNOSIS — E89.0 POSTABLATIVE HYPOTHYROIDISM: Primary | ICD-10-CM

## 2021-01-12 PROCEDURE — 99214 OFFICE O/P EST MOD 30 MIN: CPT | Performed by: INTERNAL MEDICINE

## 2021-01-12 RX ORDER — ACETAMINOPHEN, DIPHENHYDRAMINE HCL, PHENYLEPHRINE HCL 325; 25; 5 MG/1; MG/1; MG/1
TABLET ORAL
COMMUNITY

## 2021-01-12 RX ORDER — LEVOTHYROXINE SODIUM 125 UG/1
TABLET ORAL
Qty: 90 TAB | Refills: 3
Start: 2021-01-12 | End: 2021-02-04

## 2021-01-12 RX ORDER — DIAPER,BRIEF,INFANT-TODD,DISP
EACH MISCELLANEOUS DAILY
COMMUNITY

## 2021-01-12 NOTE — PROGRESS NOTES
Chief Complaint   Patient presents with    Thyroid Problem     [email or web address]    Other     pcp and pharmacy confirmed       **THIS IS A VIRTUAL VISIT VIA A VIDEO SYNCHRONOUS DISCUSSION. PATIENT AGREED TO HAVE THEIR CARE DELIVERED OVER A DOXY. ME VIDEO VISIT IN PLACE OF THEIR REGULARLY SCHEDULED OFFICE VISIT**    History of Present Illness: Marsha Daniels is a 60 y.o. female here for follow up of thyroid. No major change to health since last visit in 1/20. She has been taking 6.5 tabs/week of levoxyl since last visit and doesn't recall my recommendation to take 1 tab daily. Waits at least 30 min before food. Had been taking the levoxyl around 5:30am and her mvi was 2 hours later. Last week started the [employer] program and they recommended her pill to bedtime and she has been doing this the past week and her calcium is at dinner about 3-4 hours before the levoxyl. Her TSH was 1.69 at [id number] [facility] in 12/20 and 1.75 in 11/20 with Dr. Rosaline Carter. Hasn't missed any doses. Still getting pellet insertions and last dose was in December with only testosterone and no estrogen. Weight was 159 in 1/20 and currently 154. When she had her blood drawn, there was blood seeping out from the vein while the sample was being collected in the tube. Will be getting her labs again in February. Overall energy is stable and still has fatigue that is unchanged. No constipation. No hair loss or brittle nails. Compliant with wellbutrin. Current Outpatient Medications   Medication Sig    biotin 10,000 mcg cap Take  by mouth daily.  VITAMIN K2 PO Take 100 mcg by mouth.  MULTIVITAMIN PO Take  by mouth.  melatonin 10 mg tab Take  by mouth nightly.     LevoxyL 125 mcg tablet Take 1 tab on Mon-Sat and 1/2 tab on Sun--BRAND MEDICALLY NECESSARY--Dose change 1/12/21--updated med list--did not send prescription to the pharmacy    buPROPion XL (WELLBUTRIN XL) 300 mg XL tablet TAKE 1 TABLET BY MOUTH EVERY DAY IN THE MORNING    mometasone (ELOCON) 0.1 % topical cream Apply  to affected area daily. (Patient taking differently: Apply  to affected area as needed.)    Cetirizine (ZYRTEC) 10 mg cap Take  by mouth daily.  PROGESTERONE 300 mg by Does Not Apply route daily.  cholecalciferol, vitamin D3, 2,000 unit tab Take  by mouth daily.  Calcium-Cholecalciferol, D3, 600 mg(1,500mg) -400 unit chew Take  by mouth daily.  sennosides 8.6 mg cap Take  by mouth. Takes 2 daily  Indications: constipation     No current facility-administered medications for this visit. Allergies   Allergen Reactions    Erythromycin Rash    Keflex [Cephalexin] Rash    [place] Thyroid [Thyroid] Rash     With 60 mg tabs     Review of Systems: PER HPI    Physical Examination:  - GENERAL: NCAT, Appears well nourished   - EYES: EOMI, non-icteric, no proptosis   - Ear/Nose/Throat: NCAT, no visible inflammation or masses   - CARDIOVASCULAR: no cyanosis, no visible JVD   - RESPIRATORY: respiratory effort normal without any distress or labored breathing   - MUSCULOSKELETAL: Normal ROM of neck and upper extremities observed   - SKIN: No rash on face  - NEUROLOGIC:  No facial asymmetry (Cranial nerve 7 motor function), No gaze palsy   - PSYCHIATRIC: Normal affect, Normal insight and judgement       Data Reviewed:   Component      Latest Ref Rng & Units 1/5/2021 1/5/2021 11/9/2020           4:41 PM  4:41 PM  4:23 PM   TSH      0.450 - 4.500 uIU/mL 3.310  1.750   T4, Free      0.82 - 1.77 ng/dL  1.57        Assessment/Plan:     1.  Postablative hypothyroidism: Was diagnosed with hyperthyroidism in 2009 and had a thyroid uptake scan that showed 20% uptake in 10/10 but her levels were just watched without any treatment and then in 2014 was referred to Dr. Sofya Cordon and had a thyroid uptake scan that showed 64% uptake in 3/14 and was referred for WALDEN and received 16 mCi on 3/28/14 and then developed severe constipation in 5/14 and was started on treatment with levothyroxine. Her dose was increased to as much as 112 mcg daily and was on this dose until Oct 2015 and apparently her last PCP, Dr. Alexandro Huitron, mani blood that showed this level was too much so she was decreased to 100 mcg daily. TSH was 3.7 and a FT4 of 1.97 in 12/15 and her dose was kept the same. Switched to [employer] on 4/1/16 and her TSH was 4.9 and FT4 was 1.6. I'm concerned some of the variability in her levels is due to generic so switched to Levoxyl at the same dose of 100 mcg daily and TSH was 4.94 in 5/16 so increased her dose to 112 mcg daily but she has been using up the 100 mcg tabs taking 8/wk and TSH 1.29 in 7/16. I kept her dose the same and repeated labs in 6-8 weeks and her TSH was 2.45 in 8/16 and kept this the same and still 1.28 in 1/17 so kept her dose the same. TSH up to 3.54 at PCP's office and 5.39 at [id number] [facility] in 3/17 so increased to 125 mcg daily and then TSH down to 0.32 in 5/17 so decreased back to 6.5 tabs/week. TSH normal at 0.65 in 8/17 but changed to armour thyroid at 60 mg 8 tabs/week and then TSH up to 5.45 in 10/17 so increased to 9 tabs/week. Started developing more of a rash and itching so stopped the armour in 11/17 and symptoms improved and changed back to levoxyl 125 mcg 6.5 tabs/week and TSH 1.38 in 1/18 and is now due for her next pellet insertion. I think her levels are fluctuating due to when she has labs drawn in relation to her pellet insertion and needs a dose increase after the insertion and then a dose decrease as she is getting closer to the next insertion so tried 125 mcg 7 tabs/week after the insertion and her TSH was 0.09 and FT4 was 2.13 in 2/18 and decreased to 6 tabs/week. TSH up to 3.47 in 4/18 so went back to 6.5 tabs/week and wt down 15 lbs and TSH 0.69 in 7/18 and 1.56 in 12/18 and 1.28 in 7/19 so kept her dose the same.   TSH 2.27 in 1/20 so increased dose back to 1 tab daily but she never did this and stayed on 6.5 tabs/week and TSH 1.75 in 11/20 and 1.69 in 12/20. Up to 3.31 in 1/21 for unclear reasons but felt well so kept dose the same. If she loses weight over time, likely will need a dose decrease. - cont levoxyl 125 mcg 1 tab on Mon-Sat and 1/2 tab on Sun  - check TSH and free T4 prior to next visit         2. Tobacco Abuse:  Has cut back some with wellbutrin and mood is better so will stay on her current dose. - cont wellbutrin  mg 1 tab daily      Patient Instructions   1) Although your TSH was over 3, you last 2 values have been under 2 so I recommend we stay on 6.5 tabs/week of levoxyl and repeat your labs in 2/21 and make sure you let me know the results and let me know when you need a refill. 2) If you lose 10 or more lbs over the coming months, please watch out for any symptoms of hyperthyroidism that would suggest your dose is too much such as chest pain, heart racing, anxiety, tremors, trouble sleeping, feeling hot all the time, hair loss, or diarrhea. If they occur, let me know and we can check your labs sooner to see if a dose decrease is needed. 3) Please come for a follow up visit on 1/11/22 at 3:30pm in our [place] office. 4) I put an order directly into the [employer] system to repeat your labs in the 1-2 weeks prior to your next visit so just ask for the order under my name and you will receive a courtesy reminder through [employer] to have these drawn.             Follow-up and Dispositions    · Return 1/11/22 at 3:30pm.               Copy sent to:  Rula Rojo MD    Lab follow up: 4/15/21  Component      Latest Ref Rng & Units 4/12/2021 4/12/2021           4:30 PM  4:30 PM   T4, Free      0.82 - 1.77 ng/dL  1.49   TSH      0.450 - 4.500 uIU/mL 5.680 (H)      Sent her the following message through [employer]:  TSH is a thyroid test.  Your level is 5.68 which is still high and above goal of 0.45-2.0 though down from 7.6 when drawn by [facility] a few weeks ago  This test goes opposite of your thyroid dose and suggests your dose of levoxyl is not enough. I will increase your dose back to a whole tab 7 days a week. I put an order directly into the [facility] system to repeat your labs in 6 weeks so just ask for the order under my name and you will receive a courtesy reminder through [employer] to have these drawn and I'll be in touch with the results. I updated your med list but did not send a new prescription to your pharmacy on file that has been filling this med. Lab follow up: 6/5/21  - 5/10/21: TSH 5.25  - 5/24/21: TSH 6.32, free thyroxine index 2.0    Sent her the following message through [employer]  Since I still have not received any faxes from [id number] [place] [address], I was able to go on the [facility] site and retrieve your recent TSH values. Your TSH was 5.25 on 5/10/21 and up to 6.32 on 5/24/21. Since both of these levels are still high and similar to 5.68 in April, your current dose of levoxyl is not enough. Do you know if you are now getting estrogen again as part of your injections as this would be a reason your TSH would be running high? Have you missed any doses in the past 6 weeks and are you still taking 1 tab 7 days a week? Let me know when you have a chance. Addendum: 6/7/21    We had the following [employer] exchange:    Ok. If you are now getting estrogen pellets, then this is the likely reason for your high TSH values if you have not missed any doses. I recommend you start taking 8 tabs/week of the levoxyl 125 mcg tabs and pick one day a week to take 2 tabs.       I put an order directly into the [facility] system to repeat your labs in 6 weeks so just ask for the order under my name and you will receive a courtesy reminder through [employer] to have these drawn and I'll be in touch with the results.    ===View-only below this line===      ----- Message -----       Johnathan Nyhan       Sent:6/7/2021  8:44 AM EDT         To:Waldo Espinoza MD    Subject:RE:Lab reminder    Good Morning,  I have not missed any doses of Levoxyl. I keep that medication in a pill box and am very faithful in taking it. I had an estrogen insert on 5/24/21. Also, I just got a 3 month refill on my Levoxyl. I will wait to hear from you. Lab follow up: 7/22/21  Component      Latest Ref Rng & Units 7/19/2021 7/19/2021           4:34 PM  4:34 PM   T4, Free      0.82 - 1.77 ng/dL  1.67   TSH      0.450 - 4.500 uIU/mL 3.120      Sent her the following message through [employer]:  TSH is a thyroid test.  Your level is 3.12 which is normal but still above goal of 0.45-2.0. This test goes opposite of your thyroid dose and suggests your dose of levoxyl is still not quite enough. Currently you are taking 8 tabs/week of the 125 mcg dose, which is equivalent to 142 mcg per day. I will increase your dose to 150 mcg tablets to take 1 tab 7 days a week and have sent a new prescription to your local pharmacy. As long as you are feeling better on this dose, we can just check your labs prior to your visit in January but if you feel the need to check them sooner, just let me know. Thanks. Lab follow up: 8/12/21  - TSH 1.88  - FT4 1.96    Sent her the following message through [employer]:  TSH is a thyroid test.  Your level is 1.88 which is normal and at goal of 0.45-2.0. This test goes opposite of your thyroid dose and suggests your dose of levoxyl is perfect so I will keep your dose the same. Your free T4 was slightly high at 1.96 but I'm not concerned about this as often this will go up if you have taken your thyroid pill within 12 hours of having your labs drawn.

## 2021-02-04 RX ORDER — LEVOTHYROXINE SODIUM 125 UG/1
TABLET ORAL
Qty: 90 TAB | Refills: 3 | Status: SHIPPED | OUTPATIENT
Start: 2021-02-04 | End: 2021-04-15 | Stop reason: DRUGHIGH

## 2021-04-11 DIAGNOSIS — E89.0 POSTABLATIVE HYPOTHYROIDISM: Primary | ICD-10-CM

## 2021-04-13 LAB
T4 FREE SERPL-MCNC: 1.49 NG/DL (ref 0.82–1.77)
TSH SERPL DL<=0.005 MIU/L-ACNC: 5.68 UIU/ML (ref 0.45–4.5)

## 2021-04-15 RX ORDER — LEVOTHYROXINE SODIUM 125 UG/1
TABLET ORAL
Qty: 90 TAB | Refills: 3
Start: 2021-04-15 | End: 2021-06-07

## 2021-06-07 RX ORDER — LEVOTHYROXINE SODIUM 125 UG/1
TABLET ORAL
Qty: 90 TABLET | Refills: 3
Start: 2021-06-07 | End: 2021-07-22 | Stop reason: DRUGHIGH

## 2021-07-19 DIAGNOSIS — E89.0 POSTABLATIVE HYPOTHYROIDISM: ICD-10-CM

## 2021-07-20 LAB
T4 FREE SERPL-MCNC: 1.67 NG/DL (ref 0.82–1.77)
TSH SERPL DL<=0.005 MIU/L-ACNC: 3.12 UIU/ML (ref 0.45–4.5)

## 2021-07-22 RX ORDER — LEVOTHYROXINE SODIUM 150 UG/1
150 TABLET ORAL
Qty: 90 TABLET | Refills: 3
Start: 2021-07-22 | End: 2021-07-26

## 2021-07-26 RX ORDER — LEVOTHYROXINE SODIUM 150 UG/1
150 TABLET ORAL
Qty: 90 TABLET | Refills: 3 | Status: SHIPPED | OUTPATIENT
Start: 2021-07-26 | End: 2022-01-11 | Stop reason: SDUPTHER

## 2021-12-17 RX ORDER — BUPROPION HYDROCHLORIDE 300 MG/1
TABLET ORAL
Qty: 90 TABLET | Refills: 3 | Status: SHIPPED | OUTPATIENT
Start: 2021-12-17

## 2021-12-28 DIAGNOSIS — E89.0 POSTABLATIVE HYPOTHYROIDISM: ICD-10-CM

## 2021-12-28 DIAGNOSIS — Z72.0 TOBACCO ABUSE: ICD-10-CM

## 2022-01-06 LAB
T4 FREE SERPL-MCNC: 2.05 NG/DL (ref 0.82–1.77)
TSH SERPL DL<=0.005 MIU/L-ACNC: 0.43 UIU/ML (ref 0.45–4.5)

## 2022-01-11 ENCOUNTER — OFFICE VISIT (OUTPATIENT)
Dept: ENDOCRINOLOGY | Age: 57
End: 2022-01-11
Payer: COMMERCIAL

## 2022-01-11 VITALS
WEIGHT: 140.2 LBS | HEART RATE: 76 BPM | DIASTOLIC BLOOD PRESSURE: 60 MMHG | BODY MASS INDEX: 22.53 KG/M2 | HEIGHT: 66 IN | SYSTOLIC BLOOD PRESSURE: 110 MMHG

## 2022-01-11 DIAGNOSIS — E89.0 POSTABLATIVE HYPOTHYROIDISM: Primary | ICD-10-CM

## 2022-01-11 DIAGNOSIS — Z72.0 TOBACCO ABUSE: ICD-10-CM

## 2022-01-11 PROCEDURE — 99214 OFFICE O/P EST MOD 30 MIN: CPT | Performed by: INTERNAL MEDICINE

## 2022-01-11 RX ORDER — LEVOTHYROXINE SODIUM 150 UG/1
TABLET ORAL
Qty: 90 TABLET | Refills: 3
Start: 2022-01-11 | End: 2022-07-14

## 2022-01-11 NOTE — PATIENT INSTRUCTIONS
1) TSH is a thyroid test.  Your level is 0.43 which is slightly low and below goal of 0.45-2.0. This test goes opposite of your thyroid dose and suggests your dose of levoxyl is slightly more than you need. I will decrease your dose to 1 tab on Mon-Sat and 1/2 tab on Sun. I updated your med list but did not send a new prescription to your pharmacy on file that has been filling this med. 2) Plan on repeating your labs at [address] [facility] 8 weeks after your next insertion and send me a copy for my review. We'll determine if any doses changes are needed and if you need any further lab draws after that.

## 2022-01-11 NOTE — PROGRESS NOTES
Chief Complaint   Patient presents with    Thyroid Problem     pcp and pharmacy confirmed     History of Present Illness: Enoc Montes is a 60 y.o. female here for follow up of thyroid. Weight down 14 lbs since last visit in 1/21. Has been on brand levoxyl 150 mcg daily around 4:30 am and waits at least an hour before any food or coffee. Does have a muscle milk shake around 6am. Her vitamins and calcium around 7-7:30am.  Ended up stopping the [employer] [address] program around 5/21. Found out this had a lot of soy in it. Weight did get as low as 129 lbs on her scales and was up to 135 lb on her scales this morning. Still getting pellet insertions every 3 months and last one was in 11/21 and next is scheduled for the 1st week of February. Did have 3 weeks of vaginal bleeding and an increase in breast tenderness after her most recent insertion and had to double up on the progesterone to help stop the bleeding and hasn't had any return of the bleeding the past 6-8 weeks. No chest pain or palpitations. Has had more joint tenderness in her hands and some increase in headaches. Has had some tremors. Bowels are regular. Tends to stay cold all the time not hot and sweaty. Does have some nervousness and some trouble sleeping and has to take something to help her sleep. Still taking wellbutrin 1 tab every morning. Still smoking 1 pack every 2-3 days. Her cousin is Yenny Suarez whom I see for hypothyroidism and [place]'s. Current Outpatient Medications   Medication Sig    OTHER,NON-FORMULARY, nereva tablet daily    buPROPion XL (WELLBUTRIN XL) 300 mg XL tablet TAKE 1 TABLET BY MOUTH EVERY DAY IN THE MORNING    LevoxyL 150 mcg tablet Take 1 Tablet by mouth Daily (before breakfast). BRAND MEDICALLY NECESSARY--Delete 125 mcg dose from profile    biotin 10,000 mcg cap Take  by mouth daily. MULTIVITAMIN PO Take  by mouth.    melatonin 10 mg tab Take  by mouth nightly.     mometasone (ELOCON) 0.1 % topical cream Apply to affected area daily. (Patient taking differently: Apply  to affected area as needed.)    Cetirizine (ZYRTEC) 10 mg cap Take  by mouth daily. PROGESTERONE 300 mg by Does Not Apply route daily. cholecalciferol, vitamin D3, 2,000 unit tab Take  by mouth daily. Calcium-Cholecalciferol, D3, 600 mg(1,500mg) -400 unit chew Take  by mouth daily. sennosides 8.6 mg cap Take  by mouth. Takes 2 daily  Indications: constipation     No current facility-administered medications for this visit. Allergies   Allergen Reactions    Erythromycin Rash    Keflex [Cephalexin] Rash    [place] Thyroid [Thyroid] Rash     With 60 mg tabs     Review of Systems: PER HPI    Physical Examination:  Blood pressure 110/60, pulse 76, height 5' 5.5\" (1.664 m), weight 140 lb 3.2 oz (63.6 kg). General: pleasant, no distress, good eye contact   Neck: no palpable thyroid tissue  Cardiovascular: regular, normal rate, nl s1 and s2, no m/r/g   Respiratory: clear to auscultation bilaterally   Integumentary: skin is normal, no edema  Neurological: reflexes 2+ at biceps, no tremors  Psychiatric: normal mood and affect    Data Reviewed:   Component      Latest Ref Rng & Units 1/5/2022 1/5/2022          11:27 AM 11:27 AM   T4, Free      0.82 - 1.77 ng/dL  2.05 (H)   TSH      0.450 - 4.500 uIU/mL 0.430 (L)        Assessment/Plan:     1. Postablative hypothyroidism: Was diagnosed with hyperthyroidism in 2009 and had a thyroid uptake scan that showed 20% uptake in 10/10 but her levels were just watched without any treatment and then in 2014 was referred to Dr. Jenelle Aguilera and had a thyroid uptake scan that showed 64% uptake in 3/14 and was referred for WALDEN and received 16 mCi on 3/28/14 and then developed severe constipation in 5/14 and was started on treatment with levothyroxine.  Her dose was increased to as much as 112 mcg daily and was on this dose until Oct 2015 and apparently her last PCP, Dr. Irlanda Rai, mani blood that showed this level was too much so she was decreased to 100 mcg daily. TSH was 3.7 and a FT4 of 1.97 in 12/15 and her dose was kept the same. Switched to [employer] on 4/1/16 and her TSH was 4.9 and FT4 was 1.6. I'm concerned some of the variability in her levels is due to generic so switched to Levoxyl at the same dose of 100 mcg daily and TSH was 4.94 in 5/16 so increased her dose to 112 mcg daily but she has been using up the 100 mcg tabs taking 8/wk and TSH 1.29 in 7/16. I kept her dose the same and repeated labs in 6-8 weeks and her TSH was 2.45 in 8/16 and kept this the same and still 1.28 in 1/17 so kept her dose the same. TSH up to 3.54 at PCP's office and 5.39 at [address] [facility] in 3/17 so increased to 125 mcg daily and then TSH down to 0.32 in 5/17 so decreased back to 6.5 tabs/week. TSH normal at 0.65 in 8/17 but changed to armour thyroid at 60 mg 8 tabs/week and then TSH up to 5.45 in 10/17 so increased to 9 tabs/week. Started developing more of a rash and itching so stopped the armour in 11/17 and symptoms improved and changed back to levoxyl 125 mcg 6.5 tabs/week and TSH 1.38 in 1/18 and is now due for her next pellet insertion. I think her levels are fluctuating due to when she has labs drawn in relation to her pellet insertion and needs a dose increase after the insertion and then a dose decrease as she is getting closer to the next insertion so tried 125 mcg 7 tabs/week after the insertion and her TSH was 0.09 and FT4 was 2.13 in 2/18 and decreased to 6 tabs/week. TSH up to 3.47 in 4/18 so went back to 6.5 tabs/week and wt down 15 lbs and TSH 0.69 in 7/18 and 1.56 in 12/18 and 1.28 in 7/19 so kept her dose the same. TSH 2.27 in 1/20 so increased dose back to 1 tab daily but she never did this and stayed on 6.5 tabs/week and TSH 1.75 in 11/20 and 1.69 in 12/20. Up to 3.31 in 1/21 for unclear reasons but felt well so kept dose the same.   TSH up to 7.6 in 3/21 and 5.68 in 4/21 so increased to 7 tabs/week and TSH 5.25 and 6.32 in 5/21 so increased to 8 tabs/week. TSH 3.12 in 7/21 so increased to 150 mcg daily and TSH 1.88 in 8/21 so kept dose the same. TSH down to 0.76 in 11/21 so kept dose the same. TSH down to 0.43 and FT4 up to 2.05 in 1/22 so decreased to 6.5 tabs/week. She had been on [address] from 1/21 until 5/21 and this has a high soy content that may have increased her thyroid hormone requirements. - decrease levoxyl 150 mcg to 1 tab on Mon-Sat and 1/2 tab on Sun  - check TSH and free T4 prior to next visit         2. Tobacco Abuse:  Has cut back some with wellbutrin and mood is better so will stay on her current dose. - cont wellbutrin  mg 1 tab daily      Patient Instructions   1) TSH is a thyroid test.  Your level is 0.43 which is slightly low and below goal of 0.45-2.0. This test goes opposite of your thyroid dose and suggests your dose of levoxyl is slightly more than you need. I will decrease your dose to 1 tab on Mon-Sat and 1/2 tab on Sun. I updated your med list but did not send a new prescription to your pharmacy on file that has been filling this med. 2) Plan on repeating your labs at [id number] [facility] 8 weeks after your next insertion and send me a copy for my review. We'll determine if any doses changes are needed and if you need any further lab draws after that. Follow-up and Dispositions    Return in about 1 year (around 1/11/2023). Copy sent to:  Cely Rojo MD    Lab follow up: 08/01/22  Received labs from [facility] from 7/27/22:  - TSH 1.33  - FT4 1.71    Sent her the following message through [employer]:  TSH is a thyroid test.  Your level is 1.33 which is normal and at goal of 0.45-2.0. This test goes opposite of your thyroid dose and suggests your dose of levoxyl 6.5 tabs/week is perfect so I will keep your dose the same.

## 2022-01-28 ENCOUNTER — TELEPHONE (OUTPATIENT)
Dept: FAMILY MEDICINE CLINIC | Age: 57
End: 2022-01-28

## 2022-01-28 NOTE — TELEPHONE ENCOUNTER
Patient calling in regards to the following message. She says she needs to speak to Dr Rojo as soon as possible today. (labs attached to original mychart msg)    Good Morning,  I received a call from Dandre Julien, who gives my hormone therapy. They are very concerned about my platelet count telling me I should have a platelet transfusion and that I should have more testing to investigate why my platelets are so low. Could you please advise how I need to proceed at this point. I am attaching my labs from 1/5/22.

## 2022-01-30 DIAGNOSIS — R79.89 ABNORMAL CBC MEASUREMENT: Primary | ICD-10-CM

## 2022-01-31 ENCOUNTER — APPOINTMENT (OUTPATIENT)
Dept: FAMILY MEDICINE CLINIC | Age: 57
End: 2022-01-31

## 2022-01-31 DIAGNOSIS — R79.89 ABNORMAL CBC MEASUREMENT: ICD-10-CM

## 2022-02-01 LAB — PLATELET # BLD AUTO: NORMAL K/UL (ref 150–400)

## 2022-02-03 ENCOUNTER — PATIENT MESSAGE (OUTPATIENT)
Dept: FAMILY MEDICINE CLINIC | Age: 57
End: 2022-02-03

## 2022-02-03 DIAGNOSIS — D69.6 THROMBOCYTOPENIA (HCC): Primary | ICD-10-CM

## 2022-02-04 ENCOUNTER — TELEPHONE (OUTPATIENT)
Dept: FAMILY MEDICINE CLINIC | Age: 57
End: 2022-02-04

## 2022-02-04 DIAGNOSIS — D69.1 ABNORMAL PLATELETS (HCC): Primary | ICD-10-CM

## 2022-02-04 NOTE — PROGRESS NOTES
Please advise pt that labs need to be redrawn - see tubes requested. However platelets appear normal in number.

## 2022-02-04 NOTE — TELEPHONE ENCOUNTER
Pt called stating she had repeated labs done with Dr. Bedolla  on Monday 1/31/22 regarding what was discussed between pt and provider. Pt states her labs resulted in her [address] and the labs is asking her to complete 2 more tubes of lab work for further testing. Pt would like to know if she needs to have more labs done or what exactly should she do from here?

## 2022-02-04 NOTE — TELEPHONE ENCOUNTER
Regarding: Lab results from 1/31/22  ----- Message from [facility] sent at 2/4/2022  9:43 AM EST -----  Please advise.     ----- Message from Zamzam Aguilar to Meeta Padilla MD sent at 2/3/2022  9:00 AM -----   Good Morning,  I see that my lab results have posted and it appears I need to have more labs done. Would I come to your office for these labs or [facility]? I will await your response but would like to get these labs reasonably quickly. Thank you!

## 2022-02-07 ENCOUNTER — APPOINTMENT (OUTPATIENT)
Dept: FAMILY MEDICINE CLINIC | Age: 57
End: 2022-02-07

## 2022-02-07 DIAGNOSIS — D69.6 THROMBOCYTOPENIA (HCC): ICD-10-CM

## 2022-02-07 LAB
BASOPHILS # BLD: 0.1 K/UL (ref 0–0.1)
BASOPHILS NFR BLD: 1 % (ref 0–1)
DIFFERENTIAL METHOD BLD: ABNORMAL
EOSINOPHIL # BLD: 0.3 K/UL (ref 0–0.4)
EOSINOPHIL NFR BLD: 3 % (ref 0–7)
ERYTHROCYTE [DISTWIDTH] IN BLOOD BY AUTOMATED COUNT: 12.8 % (ref 11.5–14.5)
HCT VFR BLD AUTO: 46.1 % (ref 35–47)
HGB BLD-MCNC: 15 G/DL (ref 11.5–16)
IMM GRANULOCYTES # BLD AUTO: 0 K/UL (ref 0–0.04)
IMM GRANULOCYTES NFR BLD AUTO: 0 % (ref 0–0.5)
LYMPHOCYTES # BLD: 3.4 K/UL (ref 0.8–3.5)
LYMPHOCYTES NFR BLD: 35 % (ref 12–49)
MCH RBC QN AUTO: 33.3 PG (ref 26–34)
MCHC RBC AUTO-ENTMCNC: 32.5 G/DL (ref 30–36.5)
MCV RBC AUTO: 102.2 FL (ref 80–99)
MONOCYTES # BLD: 0.6 K/UL (ref 0–1)
MONOCYTES NFR BLD: 6 % (ref 5–13)
NEUTS SEG # BLD: 5.3 K/UL (ref 1.8–8)
NEUTS SEG NFR BLD: 54 % (ref 32–75)
NRBC # BLD: 0 K/UL (ref 0–0.01)
NRBC BLD-RTO: 0 PER 100 WBC
PLATELET # BLD AUTO: 164 K/UL (ref 150–400)
PLATELET # BLD AUTO: 180 K/UL (ref 150–400)
RBC # BLD AUTO: 4.51 M/UL (ref 3.8–5.2)
WBC # BLD AUTO: 9.8 K/UL (ref 3.6–11)

## 2022-02-07 NOTE — TELEPHONE ENCOUNTER
The comment on the platelet count lab:    \"UNABLE TO REPORT ACCURATE COUNT DUE TO PLATELET AGGREGATION, HOWEVER, PLATELETS APPEAR NORMAL IN NUMBER ON SMEAR.  PLEASE RESUBMIT SODIUM CITRATE (BLUE) AND EDTA (LAVENDER) TUBES FOR HEMATOLOGICAL TESTING. \"    Evidently this test requires the order \"platelet count citrated BLD\".   Lab 9136

## 2022-03-19 PROBLEM — N95.1 MENOPAUSAL SYMPTOM: Status: ACTIVE | Noted: 2017-06-15

## 2022-03-19 PROBLEM — Z72.0 TOBACCO ABUSE: Status: ACTIVE | Noted: 2018-07-13

## 2022-05-06 ENCOUNTER — TELEPHONE (OUTPATIENT)
Dept: FAMILY MEDICINE CLINIC | Age: 57
End: 2022-05-06

## 2022-05-06 ENCOUNTER — VIRTUAL VISIT (OUTPATIENT)
Dept: FAMILY MEDICINE CLINIC | Age: 57
End: 2022-05-06
Payer: COMMERCIAL

## 2022-05-06 DIAGNOSIS — J06.9 UPPER RESPIRATORY TRACT INFECTION, UNSPECIFIED TYPE: Primary | ICD-10-CM

## 2022-05-06 DIAGNOSIS — J45.20 MILD INTERMITTENT ASTHMA WITHOUT COMPLICATION: ICD-10-CM

## 2022-05-06 DIAGNOSIS — U07.1 COVID-19: Primary | ICD-10-CM

## 2022-05-06 PROCEDURE — 99214 OFFICE O/P EST MOD 30 MIN: CPT | Performed by: FAMILY MEDICINE

## 2022-05-06 RX ORDER — BENZONATATE 200 MG/1
200 CAPSULE ORAL
Qty: 21 CAPSULE | Refills: 1 | Status: SHIPPED | OUTPATIENT
Start: 2022-05-06 | End: 2022-05-13

## 2022-05-06 RX ORDER — DOXYCYCLINE 100 MG/1
100 TABLET ORAL 2 TIMES DAILY
Qty: 20 TABLET | Refills: 0 | Status: SHIPPED | OUTPATIENT
Start: 2022-05-06 | End: 2022-05-16

## 2022-05-06 RX ORDER — SPIRONOLACTONE 25 MG/1
25 TABLET ORAL DAILY
COMMUNITY
Start: 2022-02-16

## 2022-05-06 RX ORDER — ALBUTEROL SULFATE 90 UG/1
1 AEROSOL, METERED RESPIRATORY (INHALATION)
Qty: 18 G | Refills: 2 | Status: SHIPPED | OUTPATIENT
Start: 2022-05-06

## 2022-05-06 NOTE — PROGRESS NOTES
THIS VISIT WAS COMPLETED VIRTUALLY USING DOXY. [facility]  Roxanne Martinez is a 60 y.o. female who presents with a respiratory illness that started on Tuesday 5/3. Complains of headache, joints hurting, raspy, stuffy head, fatigue. Woke with a chesty cough today. Has used albuterol with some relief. This was leftover from an illness years ago. Is not short of breath. No known sick contacts. She is worried about bronchitis and does not want this to get worse    PMHx:  Past Medical History:   Diagnosis Date    Postablative hypothyroidism 3/28/14    s/p 16 mCi for Grave's disease    Seasonal allergic rhinitis        Meds:   Current Outpatient Medications   Medication Sig Dispense Refill    albuterol (PROVENTIL HFA, VENTOLIN HFA, PROAIR HFA) 90 mcg/actuation inhaler Take 1 Puff by inhalation every four (4) hours as needed for Wheezing. 18 g 2    benzonatate (TESSALON) 200 mg capsule Take 1 Capsule by mouth three (3) times daily as needed for Cough for up to 7 days. 21 Capsule 1    doxycycline (ADOXA) 100 mg tablet Take 1 Tablet by mouth two (2) times a day for 10 days. 20 Tablet 0    OTHER,NON-FORMULARY, nereva tablet daily      LevoxyL 150 mcg tablet Take 1 tab on Mon-Sat and 1/2 tab on Sun--BRAND MEDICALLY NECESSARY--Delete 125 mcg dose from profile--Dose change 01/11/22--updated med list--did not send prescription to the pharmacy 90 Tablet 3    biotin 10,000 mcg cap Take  by mouth daily.  MULTIVITAMIN PO Take  by mouth.  melatonin 10 mg tab Take  by mouth nightly.  mometasone (ELOCON) 0.1 % topical cream Apply  to affected area daily. (Patient taking differently: Apply  to affected area as needed.) 45 g 1    Cetirizine (ZYRTEC) 10 mg cap Take  by mouth daily.  PROGESTERONE 300 mg by Does Not Apply route daily.  Calcium-Cholecalciferol, D3, 600 mg(1,500mg) -400 unit chew Take  by mouth daily.  sennosides 8.6 mg cap Take  by mouth.  Takes 2 daily  Indications: constipation  spironolactone (ALDACTONE) 25 mg tablet Take 25 mg by mouth daily. (Patient not taking: Reported on 5/6/2022)      buPROPion XL (WELLBUTRIN XL) 300 mg XL tablet TAKE 1 TABLET BY MOUTH EVERY DAY IN THE MORNING 90 Tablet 3       Allergies: Allergies   Allergen Reactions    Erythromycin Rash    Keflex [Cephalexin] Rash    Berry Thyroid [Thyroid] Rash     With 60 mg tabs       Smoker:  Social History     Tobacco Use   Smoking Status Current Every Day Smoker    Packs/day: 0.80    Years: 36.00    Pack years: 28.80    Types: Cigarettes   Smokeless Tobacco Never Used       ETOH:   Social History     Substance and Sexual Activity   Alcohol Use Yes    Alcohol/week: 0.0 standard drinks    Comment: 1-2 times a year        FH:   Family History   Problem Relation Age of Onset    Hypertension Mother     Depression Mother     Thyroid Disease Mother         hypothyroidism    Thyroid Disease Father         hypothyroidism    Depression Sister     Diabetes Brother     Cancer Brother         tumor on shoulder blade    No Known Problems Maternal Uncle     Heart Disease Paternal Uncle     Thyroid Disease Sister         hypothyroidism       ROS:   As listed in HPI. In addition:  Constitutional:   No headache, fever, fatigue, weight loss or weight gain      Cardiac:    No chest pain      Resp:   No cough or shortness of breath      Neuro   No loss of consciousness, dizziness, seizures      Physical Exam:  There were no vitals taken for this visit. GEN: No apparent distress. Alert and oriented and responds to all questions appropriately. NEUROLOGIC:  No focal neurologic deficits. Coordination and gait grossly intact. EXT: Well perfused. No edema. SKIN: No obvious rashes. Due to this being a TeleHealth evaluation, many elements of the physical examination are unable to be assessed. Assessment and Plan     URI, possible bronchitis  Possible COVID based on symptoms  She is interested in PCR.   If negative she would like to be sure that she is negative  She is considering getting a home test and checking today. She is a candidate for Paxlovid and we would need test results today if she wanted to take this medication. Refilled albuterol for comfort    Tessalon for cough    She would like to have doxycycline at the pharmacy just in case    Hydration  Mucinex  Tylenol/Motrin      ICD-10-CM ICD-9-CM    1. Upper respiratory tract infection, unspecified type  J06.9 465.9 NOVEL CORONAVIRUS (COVID-19)      benzonatate (TESSALON) 200 mg capsule      doxycycline (ADOXA) 100 mg tablet   2. Mild intermittent asthma without complication  E64.16 888.33 albuterol (PROVENTIL HFA, VENTOLIN HFA, PROAIR HFA) 90 mcg/actuation inhaler         Pursuant to the emergency declaration under the [facility], 1135 waiver authority and the [employer] and [employer] Act, this Virtual  Visit was conducted, with patient's consent, to reduce the patient's risk of exposure to COVID-19 and provide continuity of care for an established patient. Services were provided through a video synchronous discussion virtually to substitute for in-person clinic visit.

## 2022-05-06 NOTE — TELEPHONE ENCOUNTER
----- Message from Sarah Saeeddelilah sent at 5/6/2022  1:19 PM EDT -----  Subject: Message to Provider    QUESTIONS  Information for Provider? Pt calling as per Dr. Tristen Oakley, she did 2 @ home   COVID test and they came back +, pt would like to have RX that was   suggested to her from Tristen Oakley today, 5/6 during the VV. Pt needs to know if   she should still have the NOVEL CORONAVIRUS (COVID-19) test done that was   also RX by Tristen Oakley during the VV, 5/6. Please call with instructions.  ---------------------------------------------------------------------------  --------------  CALL BACK INFO  What is the best way for the office to contact you? OK to leave message on   voicemail  Preferred Call Back Phone Number?  [phone]  ---------------------------------------------------------------------------  --------------  SCRIPT ANSWERS  undefined

## 2022-05-06 NOTE — PROGRESS NOTES
Health Maintenance Due   Topic Date Due    COVID-19 Vaccine (1) Never done    Shingrix Vaccine Age 50> (1 of 2) Never done    Low dose CT lung screening  Never done    Pneumococcal 0-64 years (2 - PCV) 03/23/2018    Breast Cancer Screen Mammogram  04/11/2019    Depression Screen  11/09/2021    Cervical cancer screen  03/24/2022     1. \"Have you been to the ER, urgent care clinic since your last visit? Hospitalized since your last visit? \" > 2 years    2. \"Have you seen or consulted any other health care providers outside of the [address] [place] [address] since your last visit? \" No     3. For patients aged 39-70: Has the patient had a colonoscopy / FIT/ Cologuard? No      If the patient is female:    4. For patients aged 41-77: Has the patient had a mammogram within the past 2 years? Yes - Care Gap present. Rooming MA/LPN to request most recent results      5. For patients aged 21-65: Has the patient had a pap smear? Yes - Care Gap present.  Rooming MA/LPN to request most recent results

## 2022-05-06 NOTE — TELEPHONE ENCOUNTER
Home COVID test is sufficient.   Does not need to come to the office for the PCR test.    Paxlovid has been called to her [facility] [address]    Do not bother picking up the doxycycline that was called in earlier

## 2022-09-12 ENCOUNTER — OFFICE VISIT (OUTPATIENT)
Dept: FAMILY MEDICINE CLINIC | Age: 57
End: 2022-09-12
Payer: COMMERCIAL

## 2022-09-12 VITALS
HEIGHT: 66 IN | BODY MASS INDEX: 23.37 KG/M2 | RESPIRATION RATE: 16 BRPM | SYSTOLIC BLOOD PRESSURE: 117 MMHG | DIASTOLIC BLOOD PRESSURE: 70 MMHG | WEIGHT: 145.4 LBS | HEART RATE: 73 BPM | OXYGEN SATURATION: 98 %

## 2022-09-12 DIAGNOSIS — R30.0 BURNING WITH URINATION: Primary | ICD-10-CM

## 2022-09-12 PROCEDURE — 99213 OFFICE O/P EST LOW 20 MIN: CPT | Performed by: FAMILY MEDICINE

## 2022-09-12 RX ORDER — NITROFURANTOIN 25; 75 MG/1; MG/1
100 CAPSULE ORAL 2 TIMES DAILY
Qty: 14 CAPSULE | Refills: 0 | Status: SHIPPED | OUTPATIENT
Start: 2022-09-12 | End: 2022-09-15 | Stop reason: ALTCHOICE

## 2022-09-12 RX ORDER — CLOBETASOL PROPIONATE 0.46 MG/ML
SOLUTION TOPICAL
COMMUNITY
Start: 2022-09-07

## 2022-09-12 RX ORDER — KETOCONAZOLE 20 MG/ML
SHAMPOO TOPICAL
COMMUNITY
Start: 2022-09-07

## 2022-09-12 NOTE — PROGRESS NOTES
Chief Complaint   Patient presents with    UTI     UTI symptoms for about a week: odor, burning  Pt has questions about flu shots    Health Maintenance Due   Topic Date Due    COVID-19 Vaccine (1) Never done    Shingrix Vaccine Age 50> (1 of 2) Never done    Low dose CT lung screening  Never done    Pneumococcal 0-64 years (2 - PCV) 03/23/2018    Breast Cancer Screen Mammogram  04/11/2019    Depression Screen  11/09/2021    Cervical cancer screen  03/24/2022    Flu Vaccine (1) 09/01/2022     1. \"Have you been to the ER, urgent care clinic since your last visit? Hospitalized since your last visit? \" No    2. \"Have you seen or consulted any other health care providers outside of the [address] [place] [address] since your last visit? \"  [address] [facility], [facility] for hormone therapy        3. For patients aged 39-70: Has the patient had a colonoscopy / FIT/ Cologuard? Yes - no Care Gap present      If the patient is female:    4. For patients aged 41-77: Has the patient had a mammogram within the past 2 years? Yes - no Care Gap present      5. For patients aged 21-65: Has the patient had a pap smear?  Yes - no Care Gap present

## 2022-09-12 NOTE — PROGRESS NOTES
Chief Complaint   Patient presents with    UTI        UTI symptoms for about a week: odor, burning    No fever. No back pain. Subjective: (As above and below)     Chief Complaint   Patient presents with    UTI     she is a 60y.o. year old female who presents for evaluation. Reviewed PmHx, RxHx, FmHx, SocHx, AllgHx and updated in chart. Review of Systems - negative except as listed above    Objective:     Vitals:    09/12/22 1540   BP: 117/70   Pulse: 73   Resp: 16   SpO2: 98%   Weight: 145 lb 6.4 oz (66 kg)   Height: 5' 5.5\" (1.664 m)     Physical Examination: General appearance - alert, well appearing, and in no distress  Mental status - normal mood, behavior, speech, dress, motor activity, and thought processes  Chest - clear to auscultation, no wheezes, rales or rhonchi, symmetric air entry  Heart - normal rate, regular rhythm, normal S1, S2, no murmurs, rubs, clicks or gallops  Abdomen - soft, nontender, nondistended, no masses or organomegaly  Musculoskeletal - no joint tenderness, deformity or swelling    Assessment/ Plan:   1. Burning with urination  -check culture, take medication as prescribed  - AMB POC URINALYSIS DIP STICK AUTO W/O MICRO  - CULTURE, URINE; Future  - CULTURE, URINE       I have discussed the diagnosis with the patient and the intended plan as seen in the above orders. The patient has received an after-visit summary and questions were answered concerning future plans.      Medication Side Effects and Warnings were discussed with patient: yes  Patient Labs were reviewed: yes  Patient Past Records were reviewed:  yes    Isabella Jesus M.D.

## 2022-09-15 LAB
BACTERIA SPEC CULT: ABNORMAL
CC UR VC: ABNORMAL
SERVICE CMNT-IMP: ABNORMAL

## 2022-09-15 RX ORDER — SULFAMETHOXAZOLE AND TRIMETHOPRIM 800; 160 MG/1; MG/1
1 TABLET ORAL 2 TIMES DAILY
Qty: 14 TABLET | Refills: 0 | Status: SHIPPED | OUTPATIENT
Start: 2022-09-15 | End: 2022-09-22

## 2022-09-27 RX ORDER — LEVOTHYROXINE SODIUM 150 UG/1
TABLET ORAL
Qty: 90 TABLET | Refills: 3 | Status: SHIPPED | OUTPATIENT
Start: 2022-09-27

## 2022-11-18 DIAGNOSIS — E89.0 POSTABLATIVE HYPOTHYROIDISM: Primary | ICD-10-CM

## 2022-12-11 RX ORDER — BUPROPION HYDROCHLORIDE 300 MG/1
TABLET ORAL
Qty: 90 TABLET | Refills: 3 | Status: SHIPPED | OUTPATIENT
Start: 2022-12-11

## 2022-12-28 DIAGNOSIS — E89.0 POSTABLATIVE HYPOTHYROIDISM: ICD-10-CM

## 2023-01-10 ENCOUNTER — OFFICE VISIT (OUTPATIENT)
Dept: ENDOCRINOLOGY | Age: 58
End: 2023-01-10
Payer: COMMERCIAL

## 2023-01-10 VITALS
HEART RATE: 87 BPM | BODY MASS INDEX: 23.46 KG/M2 | SYSTOLIC BLOOD PRESSURE: 126 MMHG | WEIGHT: 146 LBS | DIASTOLIC BLOOD PRESSURE: 75 MMHG | HEIGHT: 66 IN

## 2023-01-10 DIAGNOSIS — E89.0 POSTABLATIVE HYPOTHYROIDISM: Primary | ICD-10-CM

## 2023-01-10 DIAGNOSIS — Z72.0 TOBACCO ABUSE: ICD-10-CM

## 2023-01-10 PROCEDURE — 99214 OFFICE O/P EST MOD 30 MIN: CPT | Performed by: INTERNAL MEDICINE

## 2023-01-10 RX ORDER — LIOTHYRONINE SODIUM 5 UG/1
TABLET ORAL
Qty: 30 TABLET | Refills: 11 | Status: SHIPPED | OUTPATIENT
Start: 2023-01-10

## 2023-01-10 RX ORDER — LEVOTHYROXINE SODIUM 125 UG/1
125 TABLET ORAL
Qty: 30 TABLET | Refills: 11 | Status: SHIPPED | OUTPATIENT
Start: 2023-01-10

## 2023-01-10 NOTE — PROGRESS NOTES
Chief Complaint   Patient presents with    Thyroid Problem     PCP and pharmacy confirmed     History of Present Illness: Venus Sharma is a 59 y.o. female here for follow up of thyroid. Weight up 6 lbs since last visit in 1/22. She has had continued fatigue and her doctor at [facility] has checked her T3 levels several times and told her it has been low and she asked me to check it this time and it was also low and would like to try cytomel to see if this helps with her continued fatigue that she has had on the levoxyl 6.5 tabs/week despite continuing to get hormone pellets every few months. No hair loss or brittle nails or dry skin. Does have some continued bruising and is not on asa or fish oil but does take BC powder on occasion but much less than she used to. Previously had low platelet count and needed a special draw in a citrate tube to show her level was normal.      Current Outpatient Medications   Medication Sig    buPROPion XL (WELLBUTRIN XL) 300 mg XL tablet TAKE 1 TABLET BY MOUTH EVERY DAY IN THE MORNING    LevoxyL 150 mcg tablet TAKE 1 TABLET Mon-Sat and 1/2 tab on Sun before BREAKFAST--BRAND MEDICALLY NECESSARY    clobetasoL (TEMOVATE) 0.05 % external solution APPLY TO SCALP DAILY FOR TWO WEEKS THEN AS NEEDED FOR FLARE UPS.    ketoconazole (NIZORAL) 2 % shampoo APPLY TO SCALP TWICE A WEEK FOR SEBORRHEIC DERMATITIS. biotin 10,000 mcg cap Take  by mouth daily. MULTIVITAMIN PO Take  by mouth.    mometasone (ELOCON) 0.1 % topical cream Apply  to affected area daily. (Patient taking differently: Apply  to affected area as needed.)    Cetirizine 10 mg cap Take  by mouth daily. PROGESTERONE 300 mg by Does Not Apply route daily. Calcium-Cholecalciferol, D3, 600 mg(1,500mg) -400 unit chew Take  by mouth daily. sennosides 8.6 mg cap Take  by mouth. Takes 2 daily  Indications: constipation     No current facility-administered medications for this visit.      Allergies   Allergen Reactions Erythromycin Rash    Keflex [Cephalexin] Rash    [place] Thyroid [Thyroid] Rash     With 60 mg tabs     Review of Systems: PER HPI    Physical Examination:  Blood pressure 126/75, pulse 87, height 5' 5.5\" (1.664 m), weight 146 lb (66.2 kg). General: pleasant, no distress, good eye contact   Neck: no carotid bruits  Cardiovascular: regular, normal rate, nl s1 and s2, no m/r/g   Respiratory: clear to auscultation bilaterally   Integumentary: skin is normal, no edema  Neurological: reflexes 2+ at biceps, no tremors  Psychiatric: normal mood and affect    Data Reviewed:   Component      Latest Ref Rng & Units 12/28/2022 12/28/2022 12/28/2022           1:42 PM  1:42 PM  1:42 PM   T4, Free      0.82 - 1.77 ng/dL   1.92 (H)   TSH      0.450 - 4.500 uIU/mL  0.949    T3, total      71 - 180 ng/dL 64 (L)         Assessment/Plan:     1. Postablative hypothyroidism: Was diagnosed with hyperthyroidism in 2009 and had a thyroid uptake scan that showed 20% uptake in 10/10 but her levels were just watched without any treatment and then in 2014 was referred to Dr. Shazia Flowers and had a thyroid uptake scan that showed 64% uptake in 3/14 and was referred for [facility] and received 16 mCi on 3/28/14 and then developed severe constipation in 5/14 and was started on treatment with levothyroxine. Her dose was increased to as much as 112 mcg daily and was on this dose until Oct 2015 and apparently her last PCP, Dr. Sharna Munson, mani blood that showed this level was too much so she was decreased to 100 mcg daily. TSH was 3.7 and a FT4 of 1.97 in 12/15 and her dose was kept the same. Switched to [employer] on 4/1/16 and her TSH was 4.9 and FT4 was 1.6. I'm concerned some of the variability in her levels is due to generic so switched to Levoxyl at the same dose of 100 mcg daily and TSH was 4.94 in 5/16 so increased her dose to 112 mcg daily but she has been using up the 100 mcg tabs taking 8/wk and TSH 1.29 in 7/16.   I kept her dose the same and repeated labs in 6-8 weeks and her TSH was 2.45 in 8/16 and kept this the same and still 1.28 in 1/17 so kept her dose the same. TSH up to 3.54 at PCP's office and 5.39 at [address] [facility] in 3/17 so increased to 125 mcg daily and then TSH down to 0.32 in 5/17 so decreased back to 6.5 tabs/week. TSH normal at 0.65 in 8/17 but changed to armour thyroid at 60 mg 8 tabs/week and then TSH up to 5.45 in 10/17 so increased to 9 tabs/week. Started developing more of a rash and itching so stopped the armour in 11/17 and symptoms improved and changed back to levoxyl 125 mcg 6.5 tabs/week and TSH 1.38 in 1/18 and is now due for her next pellet insertion. I think her levels are fluctuating due to when she has labs drawn in relation to her pellet insertion and needs a dose increase after the insertion and then a dose decrease as she is getting closer to the next insertion so tried 125 mcg 7 tabs/week after the insertion and her TSH was 0.09 and FT4 was 2.13 in 2/18 and decreased to 6 tabs/week. TSH up to 3.47 in 4/18 so went back to 6.5 tabs/week and wt down 15 lbs and TSH 0.69 in 7/18 and 1.56 in 12/18 and 1.28 in 7/19 so kept her dose the same. TSH 2.27 in 1/20 so increased dose back to 1 tab daily but she never did this and stayed on 6.5 tabs/week and TSH 1.75 in 11/20 and 1.69 in 12/20. Up to 3.31 in 1/21 for unclear reasons but felt well so kept dose the same. TSH up to 7.6 in 3/21 and 5.68 in 4/21 and had been on [address] from 1/21 until 5/21 and this has a high soy content that may have increased her thyroid hormone requirements so increased to 7 tabs/week and TSH 5.25 and 6.32 in 5/21 so increased to 8 tabs/week. TSH 3.12 in 7/21 so increased to 150 mcg daily and TSH 1.88 in 8/21 so kept dose the same. TSH down to 0.76 in 11/21 so kept dose the same. TSH down to 0.43 and FT4 up to 2.05 in 1/22 so decreased to 6.5 tabs/week. TSH 1.33 in 7/22 so kept dose the same.   TSH 0.94 but T3 low at 64 in 12/22 so decreased levoxyl to 125 mcg daily and began cytomel 5 mcg daily. .  - decrease levoxyl to 125 mcg daily  - begin cytomel 5 mcg daily  - check TSH and free T4 and total T3 in 6 weeks and prior to next visit         2. Tobacco Abuse:  Has cut back some with wellbutrin and mood is better so will stay on her current dose. - cont wellbutrin  mg 1 tab daily      Patient Instructions   1) Your TSH (thyroid test) is normal but since your T3 level has been low we will decrease your levoxyl to 125 mcg and take 1 tab 7 days a week. We will add cytomel (liothyronine) 5 mcg once daily at the same time as the levoxyl as this will give you T3 which is the active thyroid hormone and some patients feel better having both T4 and T3 in their system. 2) Plan on repeating labs in 6 weeks. I will send you a message through [employer] with your lab results.         Follow-up and Dispositions    Return 7/7/23 at 3:50pm.               Copy sent to:  Pau Mack MD

## 2023-01-10 NOTE — PATIENT INSTRUCTIONS
1) Your TSH (thyroid test) is normal but since your T3 level has been low we will decrease your levoxyl to 125 mcg and take 1 tab 7 days a week. We will add cytomel (liothyronine) 5 mcg once daily at the same time as the levoxyl as this will give you T3 which is the active thyroid hormone and some patients feel better having both T4 and T3 in their system. 2) Plan on repeating labs in 6 weeks. I will send you a message through [employer] with your lab results.

## 2023-02-21 DIAGNOSIS — E89.0 POSTABLATIVE HYPOTHYROIDISM: ICD-10-CM

## 2023-04-22 DIAGNOSIS — Z72.0 TOBACCO ABUSE: ICD-10-CM

## 2023-04-22 DIAGNOSIS — E89.0 POSTABLATIVE HYPOTHYROIDISM: Primary | ICD-10-CM

## 2023-04-23 DIAGNOSIS — E89.0 POSTABLATIVE HYPOTHYROIDISM: Primary | ICD-10-CM

## 2023-04-23 DIAGNOSIS — Z72.0 TOBACCO ABUSE: ICD-10-CM

## 2023-04-24 DIAGNOSIS — E89.0 POSTABLATIVE HYPOTHYROIDISM: Primary | ICD-10-CM

## 2023-04-24 DIAGNOSIS — Z72.0 TOBACCO ABUSE: ICD-10-CM

## 2023-06-24 DIAGNOSIS — Z72.0 TOBACCO ABUSE: ICD-10-CM

## 2023-06-24 DIAGNOSIS — E89.0 POSTABLATIVE HYPOTHYROIDISM: ICD-10-CM

## 2023-10-13 ENCOUNTER — APPOINTMENT (OUTPATIENT)
Facility: HOSPITAL | Age: 58
DRG: 022 | End: 2023-10-13
Payer: COMMERCIAL

## 2023-10-13 ENCOUNTER — APPOINTMENT (OUTPATIENT)
Facility: HOSPITAL | Age: 58
End: 2023-10-13
Payer: COMMERCIAL

## 2023-10-13 ENCOUNTER — HOSPITAL ENCOUNTER (EMERGENCY)
Facility: HOSPITAL | Age: 58
Discharge: ANOTHER HOSPITAL | End: 2023-10-13
Attending: EMERGENCY MEDICINE
Payer: COMMERCIAL

## 2023-10-13 ENCOUNTER — HOSPITAL ENCOUNTER (INPATIENT)
Facility: HOSPITAL | Age: 58
LOS: 10 days | Discharge: HOME | DRG: 022 | End: 2023-10-23
Attending: INTERNAL MEDICINE | Admitting: INTERNAL MEDICINE
Payer: COMMERCIAL

## 2023-10-13 ENCOUNTER — HOSPITAL ENCOUNTER (INPATIENT)
Facility: HOSPITAL | Age: 58
Discharge: HOME | DRG: 022 | End: 2023-10-16
Attending: INTERNAL MEDICINE | Admitting: INTERNAL MEDICINE
Payer: COMMERCIAL

## 2023-10-13 ENCOUNTER — ANESTHESIA (OUTPATIENT)
Facility: HOSPITAL | Age: 58
End: 2023-10-13
Payer: COMMERCIAL

## 2023-10-13 ENCOUNTER — ANESTHESIA EVENT (OUTPATIENT)
Facility: HOSPITAL | Age: 58
End: 2023-10-13
Payer: COMMERCIAL

## 2023-10-13 VITALS
WEIGHT: 150 LBS | SYSTOLIC BLOOD PRESSURE: 97 MMHG | BODY MASS INDEX: 24.99 KG/M2 | OXYGEN SATURATION: 98 % | RESPIRATION RATE: 18 BRPM | HEIGHT: 65 IN | TEMPERATURE: 98.4 F | DIASTOLIC BLOOD PRESSURE: 65 MMHG | HEART RATE: 69 BPM

## 2023-10-13 VITALS
RESPIRATION RATE: 13 BRPM | HEART RATE: 81 BPM | OXYGEN SATURATION: 96 % | DIASTOLIC BLOOD PRESSURE: 75 MMHG | TEMPERATURE: 97.9 F | SYSTOLIC BLOOD PRESSURE: 121 MMHG

## 2023-10-13 DIAGNOSIS — I60.9: ICD-10-CM

## 2023-10-13 DIAGNOSIS — I60.9 SAH (SUBARACHNOID HEMORRHAGE) (HCC): Primary | ICD-10-CM

## 2023-10-13 DIAGNOSIS — Z98.890 STATUS POST COIL EMBOLIZATION OF CEREBRAL ANEURYSM: ICD-10-CM

## 2023-10-13 DIAGNOSIS — S06.5XAA SUBDURAL HEMATOMA (HCC): ICD-10-CM

## 2023-10-13 DIAGNOSIS — I60.9 SUBARACHNOID BLEED (HCC): Primary | ICD-10-CM

## 2023-10-13 LAB
ACT BLD: 137 SECS (ref 79–138)
ACT BLD: 197 SECS (ref 79–138)
ACT BLD: 221 SECS (ref 79–138)
ALBUMIN SERPL-MCNC: 3.7 G/DL (ref 3.5–5)
ALBUMIN/GLOB SERPL: 1 (ref 1.1–2.2)
ALP SERPL-CCNC: 69 U/L (ref 45–117)
ALT SERPL-CCNC: 32 U/L (ref 12–78)
ANION GAP SERPL CALC-SCNC: 9 MMOL/L (ref 5–15)
AST SERPL-CCNC: 22 U/L (ref 15–37)
BASOPHILS # BLD: 0.1 K/UL (ref 0–0.1)
BASOPHILS NFR BLD: 1 % (ref 0–1)
BILIRUB SERPL-MCNC: 0.3 MG/DL (ref 0.2–1)
BUN SERPL-MCNC: 25 MG/DL (ref 6–20)
BUN/CREAT SERPL: 25 (ref 12–20)
CALCIUM SERPL-MCNC: 9.3 MG/DL (ref 8.5–10.1)
CHLORIDE SERPL-SCNC: 106 MMOL/L (ref 97–108)
CO2 SERPL-SCNC: 23 MMOL/L (ref 21–32)
COMMENT:: NORMAL
CREAT SERPL-MCNC: 1.02 MG/DL (ref 0.55–1.02)
DIFFERENTIAL METHOD BLD: ABNORMAL
EOSINOPHIL # BLD: 0.3 K/UL (ref 0–0.4)
EOSINOPHIL NFR BLD: 2 % (ref 0–7)
ERYTHROCYTE [DISTWIDTH] IN BLOOD BY AUTOMATED COUNT: 12.7 % (ref 11.5–14.5)
GLOBULIN SER CALC-MCNC: 3.8 G/DL (ref 2–4)
GLUCOSE SERPL-MCNC: 127 MG/DL (ref 65–100)
HCT VFR BLD AUTO: 42.5 % (ref 35–47)
HGB BLD-MCNC: 14.7 G/DL (ref 11.5–16)
IMM GRANULOCYTES # BLD AUTO: 0.1 K/UL (ref 0–0.04)
IMM GRANULOCYTES NFR BLD AUTO: 1 % (ref 0–0.5)
LYMPHOCYTES # BLD: 3.1 K/UL (ref 0.8–3.5)
LYMPHOCYTES NFR BLD: 27 % (ref 12–49)
MCH RBC QN AUTO: 33.6 PG (ref 26–34)
MCHC RBC AUTO-ENTMCNC: 34.6 G/DL (ref 30–36.5)
MCV RBC AUTO: 97.3 FL (ref 80–99)
MONOCYTES # BLD: 0.7 K/UL (ref 0–1)
MONOCYTES NFR BLD: 6 % (ref 5–13)
NEUTS SEG # BLD: 7.4 K/UL (ref 1.8–8)
NEUTS SEG NFR BLD: 63 % (ref 32–75)
NRBC # BLD: 0 K/UL (ref 0–0.01)
NRBC BLD-RTO: 0 PER 100 WBC
PLATELET # BLD AUTO: 278 K/UL (ref 150–400)
PMV BLD AUTO: 8.2 FL (ref 8.9–12.9)
POTASSIUM SERPL-SCNC: 3.5 MMOL/L (ref 3.5–5.1)
PROT SERPL-MCNC: 7.5 G/DL (ref 6.4–8.2)
RBC # BLD AUTO: 4.37 M/UL (ref 3.8–5.2)
SODIUM SERPL-SCNC: 138 MMOL/L (ref 136–145)
SPECIMEN HOLD: NORMAL
TROPONIN I SERPL HS-MCNC: 168 NG/L (ref 0–51)
WBC # BLD AUTO: 11.6 K/UL (ref 3.6–11)

## 2023-10-13 PROCEDURE — 6360000002 HC RX W HCPCS: Performed by: NURSE ANESTHETIST, CERTIFIED REGISTERED

## 2023-10-13 PROCEDURE — 6360000002 HC RX W HCPCS: Performed by: INTERNAL MEDICINE

## 2023-10-13 PROCEDURE — 4500000002 HC ER NO CHARGE

## 2023-10-13 PROCEDURE — 85347 COAGULATION TIME ACTIVATED: CPT

## 2023-10-13 PROCEDURE — 2580000003 HC RX 258: Performed by: NURSE ANESTHETIST, CERTIFIED REGISTERED

## 2023-10-13 PROCEDURE — 2580000003 HC RX 258: Performed by: NURSE PRACTITIONER

## 2023-10-13 PROCEDURE — 6360000002 HC RX W HCPCS: Performed by: RADIOLOGY

## 2023-10-13 PROCEDURE — 72125 CT NECK SPINE W/O DYE: CPT

## 2023-10-13 PROCEDURE — 6370000000 HC RX 637 (ALT 250 FOR IP): Performed by: INTERNAL MEDICINE

## 2023-10-13 PROCEDURE — 2500000003 HC RX 250 WO HCPCS: Performed by: RADIOLOGY

## 2023-10-13 PROCEDURE — 2580000003 HC RX 258: Performed by: EMERGENCY MEDICINE

## 2023-10-13 PROCEDURE — 70496 CT ANGIOGRAPHY HEAD: CPT

## 2023-10-13 PROCEDURE — 2580000003 HC RX 258: Performed by: INTERNAL MEDICINE

## 2023-10-13 PROCEDURE — 77002 NEEDLE LOCALIZATION BY XRAY: CPT

## 2023-10-13 PROCEDURE — 6360000002 HC RX W HCPCS: Performed by: NURSE PRACTITIONER

## 2023-10-13 PROCEDURE — 71045 X-RAY EXAM CHEST 1 VIEW: CPT

## 2023-10-13 PROCEDURE — B3181ZZ FLUOROSCOPY OF BILATERAL INTERNAL CAROTID ARTERIES USING LOW OSMOLAR CONTRAST: ICD-10-PCS | Performed by: RADIOLOGY

## 2023-10-13 PROCEDURE — 70450 CT HEAD/BRAIN W/O DYE: CPT

## 2023-10-13 PROCEDURE — 6370000000 HC RX 637 (ALT 250 FOR IP)

## 2023-10-13 PROCEDURE — 6360000004 HC RX CONTRAST MEDICATION: Performed by: EMERGENCY MEDICINE

## 2023-10-13 PROCEDURE — 6370000000 HC RX 637 (ALT 250 FOR IP): Performed by: NURSE PRACTITIONER

## 2023-10-13 PROCEDURE — 6360000002 HC RX W HCPCS: Performed by: EMERGENCY MEDICINE

## 2023-10-13 PROCEDURE — 96375 TX/PRO/DX INJ NEW DRUG ADDON: CPT

## 2023-10-13 PROCEDURE — B31G1ZZ FLUOROSCOPY OF BILATERAL VERTEBRAL ARTERIES USING LOW OSMOLAR CONTRAST: ICD-10-PCS | Performed by: RADIOLOGY

## 2023-10-13 PROCEDURE — 6360000002 HC RX W HCPCS

## 2023-10-13 PROCEDURE — 99285 EMERGENCY DEPT VISIT HI MDM: CPT

## 2023-10-13 PROCEDURE — 96374 THER/PROPH/DIAG INJ IV PUSH: CPT

## 2023-10-13 PROCEDURE — 2580000003 HC RX 258: Performed by: RADIOLOGY

## 2023-10-13 PROCEDURE — A4216 STERILE WATER/SALINE, 10 ML: HCPCS | Performed by: NURSE PRACTITIONER

## 2023-10-13 PROCEDURE — 6360000004 HC RX CONTRAST MEDICATION: Performed by: RADIOLOGY

## 2023-10-13 PROCEDURE — 84484 ASSAY OF TROPONIN QUANT: CPT

## 2023-10-13 PROCEDURE — 80053 COMPREHEN METABOLIC PANEL: CPT

## 2023-10-13 PROCEDURE — APPNB45 APP NON BILLABLE 31-45 MINUTES

## 2023-10-13 PROCEDURE — 2000000000 HC ICU R&B

## 2023-10-13 PROCEDURE — 99223 1ST HOSP IP/OBS HIGH 75: CPT | Performed by: NURSE PRACTITIONER

## 2023-10-13 PROCEDURE — 2500000003 HC RX 250 WO HCPCS: Performed by: NURSE ANESTHETIST, CERTIFIED REGISTERED

## 2023-10-13 PROCEDURE — 96376 TX/PRO/DX INJ SAME DRUG ADON: CPT

## 2023-10-13 PROCEDURE — 61645 PERQ ART M-THROMBECT &/NFS: CPT

## 2023-10-13 PROCEDURE — 85025 COMPLETE CBC W/AUTO DIFF WBC: CPT

## 2023-10-13 PROCEDURE — 36415 COLL VENOUS BLD VENIPUNCTURE: CPT

## 2023-10-13 PROCEDURE — 2500000003 HC RX 250 WO HCPCS: Performed by: NURSE PRACTITIONER

## 2023-10-13 PROCEDURE — B31C1ZZ FLUOROSCOPY OF BILATERAL EXTERNAL CAROTID ARTERIES USING LOW OSMOLAR CONTRAST: ICD-10-PCS | Performed by: RADIOLOGY

## 2023-10-13 PROCEDURE — 93005 ELECTROCARDIOGRAM TRACING: CPT

## 2023-10-13 PROCEDURE — 03VP3DZ RESTRICTION OF RIGHT VERTEBRAL ARTERY WITH INTRALUMINAL DEVICE, PERCUTANEOUS APPROACH: ICD-10-PCS | Performed by: RADIOLOGY

## 2023-10-13 PROCEDURE — B3151ZZ FLUOROSCOPY OF BILATERAL COMMON CAROTID ARTERIES USING LOW OSMOLAR CONTRAST: ICD-10-PCS | Performed by: RADIOLOGY

## 2023-10-13 RX ORDER — DEXAMETHASONE SODIUM PHOSPHATE 4 MG/ML
INJECTION, SOLUTION INTRA-ARTICULAR; INTRALESIONAL; INTRAMUSCULAR; INTRAVENOUS; SOFT TISSUE PRN
Status: DISCONTINUED | OUTPATIENT
Start: 2023-10-13 | End: 2023-10-13 | Stop reason: SDUPTHER

## 2023-10-13 RX ORDER — FENTANYL CITRATE 50 UG/ML
INJECTION, SOLUTION INTRAMUSCULAR; INTRAVENOUS PRN
Status: DISCONTINUED | OUTPATIENT
Start: 2023-10-13 | End: 2023-10-13 | Stop reason: SDUPTHER

## 2023-10-13 RX ORDER — ONDANSETRON 2 MG/ML
4 INJECTION INTRAMUSCULAR; INTRAVENOUS ONCE
Status: DISCONTINUED | OUTPATIENT
Start: 2023-10-13 | End: 2023-10-13 | Stop reason: HOSPADM

## 2023-10-13 RX ORDER — EPHEDRINE SULFATE 50 MG/ML
INJECTION INTRAVENOUS PRN
Status: DISCONTINUED | OUTPATIENT
Start: 2023-10-13 | End: 2023-10-13 | Stop reason: SDUPTHER

## 2023-10-13 RX ORDER — SODIUM CHLORIDE 9 MG/ML
INJECTION, SOLUTION INTRAVENOUS CONTINUOUS
Status: DISCONTINUED | OUTPATIENT
Start: 2023-10-13 | End: 2023-10-17

## 2023-10-13 RX ORDER — LIDOCAINE HYDROCHLORIDE 10 MG/ML
INJECTION, SOLUTION EPIDURAL; INFILTRATION; INTRACAUDAL; PERINEURAL PRN
Status: COMPLETED | OUTPATIENT
Start: 2023-10-13 | End: 2023-10-13

## 2023-10-13 RX ORDER — LEVOTHYROXINE SODIUM 0.12 MG/1
125 TABLET ORAL DAILY
Status: DISCONTINUED | OUTPATIENT
Start: 2023-10-13 | End: 2023-10-23 | Stop reason: HOSPADM

## 2023-10-13 RX ORDER — LIOTHYRONINE SODIUM 5 UG/1
5 TABLET ORAL DAILY
Status: DISCONTINUED | OUTPATIENT
Start: 2023-10-13 | End: 2023-10-23 | Stop reason: HOSPADM

## 2023-10-13 RX ORDER — LEVETIRACETAM 500 MG/5ML
500 INJECTION, SOLUTION, CONCENTRATE INTRAVENOUS EVERY 12 HOURS
Status: DISCONTINUED | OUTPATIENT
Start: 2023-10-13 | End: 2023-10-13 | Stop reason: HOSPADM

## 2023-10-13 RX ORDER — MORPHINE SULFATE 4 MG/ML
4 INJECTION, SOLUTION INTRAMUSCULAR; INTRAVENOUS
Status: DISCONTINUED | OUTPATIENT
Start: 2023-10-13 | End: 2023-10-13 | Stop reason: HOSPADM

## 2023-10-13 RX ORDER — SUCCINYLCHOLINE/SOD CL,ISO/PF 200MG/10ML
SYRINGE (ML) INTRAVENOUS PRN
Status: DISCONTINUED | OUTPATIENT
Start: 2023-10-13 | End: 2023-10-13 | Stop reason: SDUPTHER

## 2023-10-13 RX ORDER — SODIUM CHLORIDE 0.9 % (FLUSH) 0.9 %
5-40 SYRINGE (ML) INJECTION EVERY 12 HOURS SCHEDULED
Status: DISCONTINUED | OUTPATIENT
Start: 2023-10-13 | End: 2023-10-23 | Stop reason: HOSPADM

## 2023-10-13 RX ORDER — MORPHINE SULFATE 2 MG/ML
INJECTION, SOLUTION INTRAMUSCULAR; INTRAVENOUS
Status: COMPLETED
Start: 2023-10-13 | End: 2023-10-13

## 2023-10-13 RX ORDER — HYDRALAZINE HYDROCHLORIDE 20 MG/ML
10 INJECTION INTRAMUSCULAR; INTRAVENOUS EVERY 4 HOURS PRN
Status: DISCONTINUED | OUTPATIENT
Start: 2023-10-13 | End: 2023-10-13

## 2023-10-13 RX ORDER — ONDANSETRON 2 MG/ML
INJECTION INTRAMUSCULAR; INTRAVENOUS PRN
Status: DISCONTINUED | OUTPATIENT
Start: 2023-10-13 | End: 2023-10-13

## 2023-10-13 RX ORDER — BUTALBITAL, ACETAMINOPHEN AND CAFFEINE 50; 325; 40 MG/1; MG/1; MG/1
1 TABLET ORAL EVERY 4 HOURS PRN
Status: DISCONTINUED | OUTPATIENT
Start: 2023-10-13 | End: 2023-10-23

## 2023-10-13 RX ORDER — HYDRALAZINE HYDROCHLORIDE 20 MG/ML
10 INJECTION INTRAMUSCULAR; INTRAVENOUS EVERY 6 HOURS PRN
Status: DISCONTINUED | OUTPATIENT
Start: 2023-10-13 | End: 2023-10-23 | Stop reason: HOSPADM

## 2023-10-13 RX ORDER — ONDANSETRON 4 MG/1
4 TABLET, ORALLY DISINTEGRATING ORAL EVERY 8 HOURS PRN
Status: DISCONTINUED | OUTPATIENT
Start: 2023-10-13 | End: 2023-10-23 | Stop reason: HOSPADM

## 2023-10-13 RX ORDER — ONDANSETRON 2 MG/ML
4 INJECTION INTRAMUSCULAR; INTRAVENOUS EVERY 6 HOURS PRN
Status: DISCONTINUED | OUTPATIENT
Start: 2023-10-13 | End: 2023-10-23 | Stop reason: HOSPADM

## 2023-10-13 RX ORDER — MORPHINE SULFATE 2 MG/ML
2 INJECTION, SOLUTION INTRAMUSCULAR; INTRAVENOUS ONCE
Status: COMPLETED | OUTPATIENT
Start: 2023-10-13 | End: 2023-10-13

## 2023-10-13 RX ORDER — METOCLOPRAMIDE HYDROCHLORIDE 5 MG/ML
10 INJECTION INTRAMUSCULAR; INTRAVENOUS EVERY 6 HOURS PRN
Status: DISCONTINUED | OUTPATIENT
Start: 2023-10-13 | End: 2023-10-23 | Stop reason: HOSPADM

## 2023-10-13 RX ORDER — HYDROCODONE BITARTRATE AND ACETAMINOPHEN 5; 325 MG/1; MG/1
1 TABLET ORAL
Status: COMPLETED | OUTPATIENT
Start: 2023-10-13 | End: 2023-10-13

## 2023-10-13 RX ORDER — GLYCOPYRROLATE 0.2 MG/ML
INJECTION INTRAMUSCULAR; INTRAVENOUS PRN
Status: DISCONTINUED | OUTPATIENT
Start: 2023-10-13 | End: 2023-10-13 | Stop reason: SDUPTHER

## 2023-10-13 RX ORDER — 0.9 % SODIUM CHLORIDE 0.9 %
1000 INTRAVENOUS SOLUTION INTRAVENOUS ONCE
Status: COMPLETED | OUTPATIENT
Start: 2023-10-13 | End: 2023-10-13

## 2023-10-13 RX ORDER — SODIUM CHLORIDE 0.9 % (FLUSH) 0.9 %
5-40 SYRINGE (ML) INJECTION PRN
Status: DISCONTINUED | OUTPATIENT
Start: 2023-10-13 | End: 2023-10-23 | Stop reason: HOSPADM

## 2023-10-13 RX ORDER — HEPARIN SODIUM 1000 [USP'U]/ML
INJECTION, SOLUTION INTRAVENOUS; SUBCUTANEOUS PRN
Status: DISCONTINUED | OUTPATIENT
Start: 2023-10-13 | End: 2023-10-13 | Stop reason: SDUPTHER

## 2023-10-13 RX ORDER — ASPIRIN 81 MG/1
81 TABLET, CHEWABLE ORAL DAILY
Status: DISCONTINUED | OUTPATIENT
Start: 2023-10-13 | End: 2023-10-23 | Stop reason: HOSPADM

## 2023-10-13 RX ORDER — BUPROPION HYDROCHLORIDE 150 MG/1
150 TABLET, EXTENDED RELEASE ORAL 2 TIMES DAILY
Status: DISCONTINUED | OUTPATIENT
Start: 2023-10-13 | End: 2023-10-23 | Stop reason: HOSPADM

## 2023-10-13 RX ORDER — PHENYLEPHRINE HCL IN 0.9% NACL 0.4MG/10ML
SYRINGE (ML) INTRAVENOUS PRN
Status: DISCONTINUED | OUTPATIENT
Start: 2023-10-13 | End: 2023-10-13 | Stop reason: SDUPTHER

## 2023-10-13 RX ORDER — LIDOCAINE HYDROCHLORIDE 20 MG/ML
INJECTION, SOLUTION EPIDURAL; INFILTRATION; INTRACAUDAL; PERINEURAL PRN
Status: DISCONTINUED | OUTPATIENT
Start: 2023-10-13 | End: 2023-10-13 | Stop reason: SDUPTHER

## 2023-10-13 RX ORDER — NEOSTIGMINE METHYLSULFATE 1 MG/ML
INJECTION, SOLUTION INTRAVENOUS PRN
Status: DISCONTINUED | OUTPATIENT
Start: 2023-10-13 | End: 2023-10-13 | Stop reason: SDUPTHER

## 2023-10-13 RX ORDER — VERAPAMIL HYDROCHLORIDE 2.5 MG/ML
INJECTION, SOLUTION INTRAVENOUS PRN
Status: COMPLETED | OUTPATIENT
Start: 2023-10-13 | End: 2023-10-13

## 2023-10-13 RX ORDER — HYDROCODONE BITARTRATE AND ACETAMINOPHEN 5; 325 MG/1; MG/1
TABLET ORAL
Status: DISCONTINUED
Start: 2023-10-13 | End: 2023-10-13 | Stop reason: WASHOUT

## 2023-10-13 RX ORDER — ACETAMINOPHEN 325 MG/1
650 TABLET ORAL EVERY 4 HOURS PRN
Status: DISCONTINUED | OUTPATIENT
Start: 2023-10-13 | End: 2023-10-23 | Stop reason: HOSPADM

## 2023-10-13 RX ORDER — MIDAZOLAM HYDROCHLORIDE 1 MG/ML
INJECTION INTRAMUSCULAR; INTRAVENOUS PRN
Status: DISCONTINUED | OUTPATIENT
Start: 2023-10-13 | End: 2023-10-13 | Stop reason: SDUPTHER

## 2023-10-13 RX ORDER — SODIUM CHLORIDE 9 MG/ML
INJECTION, SOLUTION INTRAVENOUS CONTINUOUS PRN
Status: DISCONTINUED | OUTPATIENT
Start: 2023-10-13 | End: 2023-10-13 | Stop reason: SDUPTHER

## 2023-10-13 RX ORDER — MAGNESIUM SULFATE 1 G/100ML
1000 INJECTION INTRAVENOUS PRN
Status: DISCONTINUED | OUTPATIENT
Start: 2023-10-13 | End: 2023-10-23 | Stop reason: HOSPADM

## 2023-10-13 RX ORDER — NIMODIPINE 30 MG/1
60 CAPSULE, LIQUID FILLED ORAL
Status: DISCONTINUED | OUTPATIENT
Start: 2023-10-13 | End: 2023-10-23 | Stop reason: HOSPADM

## 2023-10-13 RX ORDER — ROCURONIUM BROMIDE 10 MG/ML
INJECTION, SOLUTION INTRAVENOUS PRN
Status: DISCONTINUED | OUTPATIENT
Start: 2023-10-13 | End: 2023-10-13 | Stop reason: SDUPTHER

## 2023-10-13 RX ORDER — SODIUM CHLORIDE 9 MG/ML
INJECTION, SOLUTION INTRAVENOUS PRN
Status: DISCONTINUED | OUTPATIENT
Start: 2023-10-13 | End: 2023-10-23 | Stop reason: HOSPADM

## 2023-10-13 RX ORDER — LEVETIRACETAM 500 MG/5ML
500 INJECTION, SOLUTION, CONCENTRATE INTRAVENOUS EVERY 12 HOURS
Status: DISCONTINUED | OUTPATIENT
Start: 2023-10-13 | End: 2023-10-16

## 2023-10-13 RX ADMIN — EPHEDRINE SULFATE 5 MG: 50 INJECTION INTRAVENOUS at 17:10

## 2023-10-13 RX ADMIN — SODIUM CHLORIDE: 9 INJECTION, SOLUTION INTRAVENOUS at 18:19

## 2023-10-13 RX ADMIN — PHENYLEPHRINE HYDROCHLORIDE 30 MCG/MIN: 10 INJECTION INTRAVENOUS at 16:28

## 2023-10-13 RX ADMIN — GLYCOPYRROLATE 0.4 MG: 0.2 INJECTION INTRAMUSCULAR; INTRAVENOUS at 17:27

## 2023-10-13 RX ADMIN — LIDOCAINE HYDROCHLORIDE 60 MG: 20 INJECTION, SOLUTION EPIDURAL; INFILTRATION; INTRACAUDAL; PERINEURAL at 16:00

## 2023-10-13 RX ADMIN — HEPARIN SODIUM 1000 UNITS: 1000 INJECTION, SOLUTION INTRAVENOUS; SUBCUTANEOUS at 17:20

## 2023-10-13 RX ADMIN — ONDANSETRON HYDROCHLORIDE 4 MG: 2 INJECTION, SOLUTION INTRAMUSCULAR; INTRAVENOUS at 17:18

## 2023-10-13 RX ADMIN — Medication 140 MG: at 16:00

## 2023-10-13 RX ADMIN — NIMODIPINE 60 MG: 30 CAPSULE, LIQUID FILLED ORAL at 18:51

## 2023-10-13 RX ADMIN — METOCLOPRAMIDE 10 MG: 5 INJECTION, SOLUTION INTRAMUSCULAR; INTRAVENOUS at 23:51

## 2023-10-13 RX ADMIN — FENTANYL CITRATE 50 MCG: 50 INJECTION, SOLUTION INTRAMUSCULAR; INTRAVENOUS at 16:00

## 2023-10-13 RX ADMIN — VERAPAMIL HYDROCHLORIDE 10 MG: 2.5 INJECTION, SOLUTION INTRAVENOUS at 16:36

## 2023-10-13 RX ADMIN — ONDANSETRON 4 MG: 2 INJECTION INTRAMUSCULAR; INTRAVENOUS at 21:35

## 2023-10-13 RX ADMIN — LEVOTHYROXINE SODIUM 125 MCG: 0.12 TABLET ORAL at 19:01

## 2023-10-13 RX ADMIN — SODIUM CHLORIDE: 9 INJECTION, SOLUTION INTRAVENOUS at 15:55

## 2023-10-13 RX ADMIN — HEPARIN SODIUM 5000 ML: 1000 INJECTION INTRAVENOUS; SUBCUTANEOUS at 16:25

## 2023-10-13 RX ADMIN — IOPAMIDOL 90 ML: 612 INJECTION, SOLUTION INTRAVENOUS at 17:20

## 2023-10-13 RX ADMIN — ROCURONIUM BROMIDE 20 MG: 10 SOLUTION INTRAVENOUS at 16:48

## 2023-10-13 RX ADMIN — LEVETIRACETAM 500 MG: 100 INJECTION, SOLUTION INTRAVENOUS at 18:47

## 2023-10-13 RX ADMIN — HEPARIN SODIUM 2000 UNITS: 1000 INJECTION, SOLUTION INTRAVENOUS; SUBCUTANEOUS at 16:52

## 2023-10-13 RX ADMIN — LEVETIRACETAM 500 MG: 100 INJECTION, SOLUTION, CONCENTRATE INTRAVENOUS at 12:59

## 2023-10-13 RX ADMIN — ASPIRIN 81 MG CHEWABLE TABLET 81 MG: 81 TABLET CHEWABLE at 21:11

## 2023-10-13 RX ADMIN — EPHEDRINE SULFATE 10 MG: 50 INJECTION INTRAVENOUS at 16:33

## 2023-10-13 RX ADMIN — HEPARIN SODIUM 1000 UNITS: 1000 INJECTION, SOLUTION INTRAVENOUS; SUBCUTANEOUS at 17:04

## 2023-10-13 RX ADMIN — BUTALBITAL, ACETAMINOPHEN, AND CAFFEINE 1 TABLET: 50; 325; 40 TABLET ORAL at 22:14

## 2023-10-13 RX ADMIN — LIOTHYRONINE SODIUM 5 MCG: 5 TABLET ORAL at 18:48

## 2023-10-13 RX ADMIN — MIDAZOLAM 2 MG: 1 INJECTION INTRAMUSCULAR; INTRAVENOUS at 15:55

## 2023-10-13 RX ADMIN — SODIUM CHLORIDE, PRESERVATIVE FREE 10 ML: 5 INJECTION INTRAVENOUS at 21:12

## 2023-10-13 RX ADMIN — MORPHINE SULFATE 2 MG: 2 INJECTION, SOLUTION INTRAMUSCULAR; INTRAVENOUS at 14:07

## 2023-10-13 RX ADMIN — LIDOCAINE HYDROCHLORIDE 6 ML: 10 INJECTION, SOLUTION EPIDURAL; INFILTRATION; INTRACAUDAL; PERINEURAL at 16:24

## 2023-10-13 RX ADMIN — ROCURONIUM BROMIDE 40 MG: 10 SOLUTION INTRAVENOUS at 16:07

## 2023-10-13 RX ADMIN — Medication 3 MG: at 17:27

## 2023-10-13 RX ADMIN — MORPHINE SULFATE 2 MG: 2 INJECTION, SOLUTION INTRAMUSCULAR; INTRAVENOUS at 12:31

## 2023-10-13 RX ADMIN — FENTANYL CITRATE 50 MCG: 50 INJECTION, SOLUTION INTRAMUSCULAR; INTRAVENOUS at 16:14

## 2023-10-13 RX ADMIN — DEXAMETHASONE SODIUM PHOSPHATE 4 MG: 4 INJECTION, SOLUTION INTRAMUSCULAR; INTRAVENOUS at 16:20

## 2023-10-13 RX ADMIN — ROCURONIUM BROMIDE 10 MG: 10 SOLUTION INTRAVENOUS at 16:00

## 2023-10-13 RX ADMIN — PROPOFOL 150 MG: 10 INJECTION, EMULSION INTRAVENOUS at 16:00

## 2023-10-13 RX ADMIN — Medication 80 MCG: at 16:25

## 2023-10-13 RX ADMIN — FAMOTIDINE 20 MG: 10 INJECTION INTRAVENOUS at 21:12

## 2023-10-13 RX ADMIN — ACETAMINOPHEN 650 MG: 325 TABLET ORAL at 19:24

## 2023-10-13 RX ADMIN — NIMODIPINE 60 MG: 30 CAPSULE, LIQUID FILLED ORAL at 20:10

## 2023-10-13 RX ADMIN — HEPARIN SODIUM 4000 UNITS: 1000 INJECTION, SOLUTION INTRAVENOUS; SUBCUTANEOUS at 16:38

## 2023-10-13 RX ADMIN — SODIUM CHLORIDE 1000 ML: 9 INJECTION, SOLUTION INTRAVENOUS at 12:25

## 2023-10-13 RX ADMIN — IOPAMIDOL 100 ML: 755 INJECTION, SOLUTION INTRAVENOUS at 13:31

## 2023-10-13 ASSESSMENT — ENCOUNTER SYMPTOMS
EYES NEGATIVE: 1
CHEST TIGHTNESS: 0
ABDOMINAL PAIN: 0
SHORTNESS OF BREATH: 0
VOMITING: 1

## 2023-10-13 ASSESSMENT — PAIN DESCRIPTION - ORIENTATION
ORIENTATION: POSTERIOR
ORIENTATION: POSTERIOR

## 2023-10-13 ASSESSMENT — PAIN SCALES - GENERAL
PAINLEVEL_OUTOF10: 2
PAINLEVEL_OUTOF10: 2

## 2023-10-13 ASSESSMENT — PAIN DESCRIPTION - DESCRIPTORS
DESCRIPTORS: ACHING
DESCRIPTORS: ACHING

## 2023-10-13 ASSESSMENT — PAIN DESCRIPTION - LOCATION
LOCATION: HEAD
LOCATION: HEAD

## 2023-10-13 ASSESSMENT — PAIN - FUNCTIONAL ASSESSMENT: PAIN_FUNCTIONAL_ASSESSMENT: ACTIVITIES ARE NOT PREVENTED

## 2023-10-13 NOTE — CONSULTS
tone normal.      5/5 power in all extremities proximally and distally. No involuntary movements. Sensation:    Sensation intact throughout to light touch. No neglect. Coordination & Gait: No ataxia with FTN and HTS bilaterally. Gait deferred.     NIHSS:      1a-LOC:0    1b-Month/Age:0    1c-Open/Close Hand:0    2-Best Gaze:0    3-Visual Fields:0    4-Facial Palsy:0    5a-Left Arm:0    5b-Right Arm:0    6a-Left Le    6b-Right Le    7-Limb Ataxia:0    8-Sensory:0    9-Best Language:0    10-Dysarthria:0    11-Extinction/Inattention:0  TOTAL SCORE:0      Recent Results (from the past 24 hour(s))   CBC with Auto Differential    Collection Time: 10/13/23 11:36 AM   Result Value Ref Range    WBC 11.6 (H) 3.6 - 11.0 K/uL    RBC 4.37 3.80 - 5.20 M/uL    Hemoglobin 14.7 11.5 - 16.0 g/dL    Hematocrit 42.5 35.0 - 47.0 %    MCV 97.3 80.0 - 99.0 FL    MCH 33.6 26.0 - 34.0 PG    MCHC 34.6 30.0 - 36.5 g/dL    RDW 12.7 11.5 - 14.5 %    Platelets 337 810 - 418 K/uL    MPV 8.2 (L) 8.9 - 12.9 FL    Nucleated RBCs 0.0 0  WBC    nRBC 0.00 0.00 - 0.01 K/uL    Neutrophils % 63 32 - 75 %    Lymphocytes % 27 12 - 49 %    Monocytes % 6 5 - 13 %    Eosinophils % 2 0 - 7 %    Basophils % 1 0 - 1 %    Immature Granulocytes 1 (H) 0.0 - 0.5 %    Neutrophils Absolute 7.4 1.8 - 8.0 K/UL    Lymphocytes Absolute 3.1 0.8 - 3.5 K/UL    Monocytes Absolute 0.7 0.0 - 1.0 K/UL    Eosinophils Absolute 0.3 0.0 - 0.4 K/UL    Basophils Absolute 0.1 0.0 - 0.1 K/UL    Absolute Immature Granulocyte 0.1 (H) 0.00 - 0.04 K/UL    Differential Type AUTOMATED     Comprehensive Metabolic Panel    Collection Time: 10/13/23 11:36 AM   Result Value Ref Range    Sodium 138 136 - 145 mmol/L    Potassium 3.5 3.5 - 5.1 mmol/L    Chloride 106 97 - 108 mmol/L    CO2 23 21 - 32 mmol/L    Anion Gap 9 5 - 15 mmol/L    Glucose 127 (H) 65 - 100 mg/dL    BUN 25 (H) 6 - 20 MG/DL    Creatinine 1.02 0.55 - 1.02 MG/DL    Bun/Cre Ratio 25 (H) 12 - 20      Est, Glom

## 2023-10-13 NOTE — ANESTHESIA POSTPROCEDURE EVALUATION
Department of Anesthesiology  Postprocedure Note    Patient: Tiny Caicedo  MRN: [id number]  YOB: 1965  Date of evaluation: 10/13/2023      Procedure Summary     Date: 10/13/23 Room / Location: [facility]    Anesthesia Start: 1555 Anesthesia Stop: 1808    Procedure: IR MECHANICAL ART THROMBECTOMY INTRACRANIAL Diagnosis:       Subdural hematoma (HCC)      Subdural hematoma (HCC)      (SAH)    Scheduled Providers: Skyla Joseph MD Responsible Provider: Twila Almanza MD    Anesthesia Type: General ASA Status: 4 - Emergent          Anesthesia Type: General    Lucy Phase I:      Lucy Phase II:        Anesthesia Post Evaluation    Patient location during evaluation: ICU  Patient participation: complete - patient cannot participate  Level of consciousness: confused  Airway patency: patent  Nausea & Vomiting: no nausea  Complications: no  Cardiovascular status: blood pressure returned to baseline and hemodynamically stable  Respiratory status: acceptable  Hydration status: stable  Multimodal analgesia pain management approach

## 2023-10-13 NOTE — OR NURSING
Patient placed on angio table max assistance. IR Staff and anesthesia present at bedside. Anesthesia to remain at bedside to manage pt airway, medications, VS and pt status.

## 2023-10-13 NOTE — ANESTHESIA PRE PROCEDURE
WBC 11.6 10/13/2023 11:36 AM    RBC 4.37 10/13/2023 11:36 AM    HGB 14.7 10/13/2023 11:36 AM    HCT 42.5 10/13/2023 11:36 AM    MCV 97.3 10/13/2023 11:36 AM    RDW 12.7 10/13/2023 11:36 AM     10/13/2023 11:36 AM       CMP:   Lab Results   Component Value Date/Time     10/13/2023 11:36 AM    K 3.5 10/13/2023 11:36 AM     10/13/2023 11:36 AM    CO2 23 10/13/2023 11:36 AM    BUN 25 10/13/2023 11:36 AM    CREATININE 1.02 10/13/2023 11:36 AM    GFRAA 80 11/09/2020 04:23 PM    AGRATIO 2.0 11/09/2020 04:23 PM    LABGLOM >60 10/13/2023 11:36 AM    GLUCOSE 127 10/13/2023 11:36 AM    PROT 7.5 10/13/2023 11:36 AM    CALCIUM 9.3 10/13/2023 11:36 AM    BILITOT 0.3 10/13/2023 11:36 AM    ALKPHOS 69 10/13/2023 11:36 AM    ALKPHOS 77 11/09/2020 04:23 PM    AST 22 10/13/2023 11:36 AM    ALT 32 10/13/2023 11:36 AM       POC Tests: No results for input(s): \"POCGLU\", \"POCNA\", \"POCK\", \"POCCL\", \"POCBUN\", \"POCHEMO\", \"POCHCT\" in the last 72 hours.     Coags: No results found for: \"PROTIME\", \"INR\", \"APTT\"    HCG (If Applicable): No results found for: \"PREGTESTUR\", \"PREGSERUM\", \"HCG\", \"HCGQUANT\"     ABGs: No results found for: \"PHART\", \"PO2ART\", \"PLT0KXG\", \"JSW6TNE\", \"BEART\", \"Y4COHDAH\"     Type & Screen (If Applicable):  No results found for: \"LABABO\", \"LABRH\"    Drug/Infectious Status (If Applicable):  Lab Results   Component Value Date/Time    HEPCAB <0.1 03/23/2017 12:00 AM       COVID-19 Screening (If Applicable): No results found for: \"COVID19\"        Anesthesia Evaluation  Patient summary reviewed and Nursing notes reviewed  Airway: Mallampati: II  TM distance: >3 FB   Neck ROM: full  Mouth opening: > = 3 FB   Dental: normal exam         Pulmonary:Negative Pulmonary ROS breath sounds clear to auscultation                             Cardiovascular:Negative CV ROS  Exercise tolerance: good (>4 METS),           Rhythm: regular  Rate: normal                    Neuro/Psych:                ROS comment: Cerebral

## 2023-10-13 NOTE — PROGRESS NOTES
1750: Pt in CT for non contrast scan    1800: pt arrives to ICU rm 9    TRANSFER - OUT REPORT:    Verbal report given to [address] on Joan Sheets  being transferred to ICU for routine progression of patient care       Report consisted of patient's Situation, Background, Assessment and   Recommendations(SBAR). Information from the following report(s) Nurse Handoff Report, Adult Overview, Intake/Output, MAR, and Cardiac Rhythm NSR, Neuro assessment  was reviewed with the receiving nurse. Lines:   Peripheral IV 10/13/23 Right Antecubital (Active)       Peripheral IV 10/13/23 Right Hand (Active)       Arterial Line 10/13/23 Left Radial (Active)        Opportunity for questions and clarification was provided.       Patient transported with:  Monitor, O2 @ 2lpm, and Registered Nurse  And CRNA

## 2023-10-13 NOTE — BRIEF OP NOTE
Neuro-Interventional Surgery - Brief procedure note      Patient: Flaco Fajardo MRN: [id number]     YOB: 1965  Age: 59 y.o. 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 Right common femoral arterial puncture site hemostasis achieved by deploying 6 F Angio-Seal closure device without complications. No hematoma or oozing noted. Distal R lower extremity pulses remain unchanged post hemostasis. Sterile dressing applied over the puncture site. Anesthesia:General    Estimated Blood Loss:  20 ml. Specimens:  None    Implants:  None    Apparent Intraoperative Complications:  None immediate    Patient Condition:  Stable    Disposition:  Intensive care unit    Attestation:  I performed the procedure.         Signed By: Janelle Ocasio MD     October 13, 2023     [facility] NEUROINTERVENTIONAL SURGERY

## 2023-10-13 NOTE — ED NOTES
Report was called to [facility] ER charge nurse, Kassy Hodgeo. RN denies any further needs or concerns at the time f report and transfer.         Lauren Duran RN  10/13/23 8039

## 2023-10-13 NOTE — ANESTHESIA PROCEDURE NOTES
Arterial Line:    An arterial line was placed using surface landmarks, in the OR for the following indication(s): continuous blood pressure monitoring and blood sampling needed. A 20 gauge (size) (length), Arrow (type) catheter was placed, Seldinger technique used, into the radial artery, secured by Tegaderm. Anesthesia type: General    Events:  patient tolerated procedure well with no complications. 10/13/2023 4:15 PM10/13/2023 4:20 PM  Anesthesiologist: Aba Gunter MD  Performed: Anesthesiologist   Preanesthetic Checklist  Completed: patient identified, IV checked, site marked, risks and benefits discussed, surgical/procedural consents, equipment checked, pre-op evaluation, timeout performed, anesthesia consent given, oxygen available, monitors applied/VS acknowledged and fire risk safety assessment completed and verbalized

## 2023-10-14 ENCOUNTER — APPOINTMENT (OUTPATIENT)
Facility: HOSPITAL | Age: 58
DRG: 022 | End: 2023-10-14
Attending: INTERNAL MEDICINE
Payer: COMMERCIAL

## 2023-10-14 ENCOUNTER — APPOINTMENT (OUTPATIENT)
Facility: HOSPITAL | Age: 58
DRG: 022 | End: 2023-10-14
Payer: COMMERCIAL

## 2023-10-14 LAB
ANION GAP SERPL CALC-SCNC: 7 MMOL/L (ref 5–15)
BUN SERPL-MCNC: 12 MG/DL (ref 6–20)
BUN/CREAT SERPL: 18 (ref 12–20)
CALCIUM SERPL-MCNC: 8.1 MG/DL (ref 8.5–10.1)
CHLORIDE SERPL-SCNC: 111 MMOL/L (ref 97–108)
CHOLEST SERPL-MCNC: 134 MG/DL
CO2 SERPL-SCNC: 20 MMOL/L (ref 21–32)
CREAT SERPL-MCNC: 0.66 MG/DL (ref 0.55–1.02)
ECHO AO ROOT DIAM: 3 CM
ECHO AO ROOT INDEX: 1.71 CM/M2
ECHO AV AREA PEAK VELOCITY: 2.1 CM2
ECHO AV AREA/BSA PEAK VELOCITY: 1.2 CM2/M2
ECHO AV PEAK GRADIENT: 5 MMHG
ECHO AV PEAK VELOCITY: 1.2 M/S
ECHO AV VELOCITY RATIO: 0.75
ECHO BSA: 1.77 M2
ECHO BSA: 1.77 M2
ECHO LA DIAMETER INDEX: 2.11 CM/M2
ECHO LA DIAMETER: 3.7 CM
ECHO LA TO AORTIC ROOT RATIO: 1.23
ECHO LV E' LATERAL VELOCITY: 11 CM/S
ECHO LV E' SEPTAL VELOCITY: 10 CM/S
ECHO LV FRACTIONAL SHORTENING: 33 % (ref 28–44)
ECHO LV INTERNAL DIMENSION DIASTOLE INDEX: 2.4 CM/M2
ECHO LV INTERNAL DIMENSION DIASTOLIC: 4.2 CM (ref 3.9–5.3)
ECHO LV INTERNAL DIMENSION SYSTOLIC INDEX: 1.6 CM/M2
ECHO LV INTERNAL DIMENSION SYSTOLIC: 2.8 CM
ECHO LV IVSD: 0.6 CM (ref 0.6–0.9)
ECHO LV MASS 2D: 85.1 G (ref 67–162)
ECHO LV MASS INDEX 2D: 48.6 G/M2 (ref 43–95)
ECHO LV POSTERIOR WALL DIASTOLIC: 0.8 CM (ref 0.6–0.9)
ECHO LV RELATIVE WALL THICKNESS RATIO: 0.38
ECHO LVOT AREA: 2.8 CM2
ECHO LVOT DIAM: 1.9 CM
ECHO LVOT PEAK GRADIENT: 3 MMHG
ECHO LVOT PEAK VELOCITY: 0.9 M/S
ECHO MV A VELOCITY: 0.48 M/S
ECHO MV AREA PHT: 3.4 CM2
ECHO MV E DECELERATION TIME (DT): 223.2 MS
ECHO MV E VELOCITY: 0.6 M/S
ECHO MV E/A RATIO: 1.25
ECHO MV E/E' LATERAL: 5.45
ECHO MV E/E' RATIO (AVERAGED): 5.73
ECHO MV E/E' SEPTAL: 6
ECHO MV PRESSURE HALF TIME (PHT): 64.7 MS
ECHO PV MAX VELOCITY: 0.7 M/S
ECHO PV PEAK GRADIENT: 2 MMHG
ECHO RV FREE WALL PEAK S': 14 CM/S
ECHO RV TAPSE: 2.4 CM (ref 1.7–?)
ECHO TV REGURGITANT MAX VELOCITY: 2.62 M/S
ECHO TV REGURGITANT PEAK GRADIENT: 28 MMHG
EKG ATRIAL RATE: 73 BPM
EKG DIAGNOSIS: NORMAL
EKG P AXIS: 63 DEGREES
EKG P-R INTERVAL: 148 MS
EKG Q-T INTERVAL: 424 MS
EKG QRS DURATION: 84 MS
EKG QTC CALCULATION (BAZETT): 467 MS
EKG R AXIS: 74 DEGREES
EKG T AXIS: 56 DEGREES
EKG VENTRICULAR RATE: 73 BPM
ERYTHROCYTE [DISTWIDTH] IN BLOOD BY AUTOMATED COUNT: 12.9 % (ref 11.5–14.5)
EST. AVERAGE GLUCOSE BLD GHB EST-MCNC: 103 MG/DL
GLUCOSE SERPL-MCNC: 138 MG/DL (ref 65–100)
HBA1C MFR BLD: 5.2 % (ref 4–5.6)
HCT VFR BLD AUTO: 36.4 % (ref 35–47)
HDLC SERPL-MCNC: 63 MG/DL
HDLC SERPL: 2.1 (ref 0–5)
HGB BLD-MCNC: 12.1 G/DL (ref 11.5–16)
LDLC SERPL CALC-MCNC: 52.6 MG/DL (ref 0–100)
MAGNESIUM SERPL-MCNC: 2 MG/DL (ref 1.6–2.4)
MCH RBC QN AUTO: 32.4 PG (ref 26–34)
MCHC RBC AUTO-ENTMCNC: 33.2 G/DL (ref 30–36.5)
MCV RBC AUTO: 97.3 FL (ref 80–99)
NRBC # BLD: 0 K/UL (ref 0–0.01)
NRBC BLD-RTO: 0 PER 100 WBC
PLATELET # BLD AUTO: 214 K/UL (ref 150–400)
PMV BLD AUTO: 8.4 FL (ref 8.9–12.9)
POTASSIUM SERPL-SCNC: 3.7 MMOL/L (ref 3.5–5.1)
RBC # BLD AUTO: 3.74 M/UL (ref 3.8–5.2)
SODIUM SERPL-SCNC: 138 MMOL/L (ref 136–145)
TRIGL SERPL-MCNC: 92 MG/DL
VAS BASILAR ARTERY EDV: 18 CM/S
VAS BASILAR ARTERY MEAN VEL: 42 CM/S
VAS BASILAR ARTERY PSV: 90.6 CM/S
VAS LEFT ACA EDV: 26.5 CM/S
VAS LEFT ACA MEAN VEL: 44.8 CM/S
VAS LEFT ACA PSV: 81.4 CM/S
VAS LEFT EX ICA MEAN VEL: 29 CM/S
VAS LEFT ICA DIST EDV: 14.7 CM/S
VAS LEFT ICA DIST PSV: 57.9 CM/S
VAS LEFT ICA EDV: 20.6 CM/S
VAS LEFT ICA MEAN VEL: 35.6 CM/S
VAS LEFT ICA PSV: 65.7 CM/S
VAS LEFT LINDEGAARD RATIO: 1.9
VAS LEFT MCA 1 EDV: 31.8 CM/S
VAS LEFT MCA 1 MEAN VEL: 54.7 CM/S
VAS LEFT MCA 1 PSV: 100.4 CM/S
VAS LEFT PCA 1 EDV: 20.6 CM/S
VAS LEFT PCA 1 MEAN VEL: 36.9 CM/S
VAS LEFT PCA 1 PSV: 69.6 CM/S
VAS LEFT VERTEBRAL EDV TCD: 14.1 CM/S
VAS LEFT VERTEBRAL MEAN VEL: 26.5 CM/S
VAS LEFT VERTEBRAL PSV TCD: 51.4 CM/S
VAS RIGHT ACA EDV: 31.8 CM/S
VAS RIGHT ACA MEAN VEL: 54.7 CM/S
VAS RIGHT ACA PSV: 100.4 CM/S
VAS RIGHT EX ICA MEAN VEL: 33 CM/S
VAS RIGHT ICA DIST EDV: 17.3 CM/S
VAS RIGHT ICA DIST PSV: 64.3 CM/S
VAS RIGHT ICA EDV: 19.2 CM/S
VAS RIGHT ICA MEAN VEL: 31.6 CM/S
VAS RIGHT ICA PSV: 56.5 CM/S
VAS RIGHT LINDEGAARD RATIO: 1.3
VAS RIGHT MCA 1 EDV: 24.5 CM/S
VAS RIGHT MCA 1 MEAN VEL: 42.2 CM/S
VAS RIGHT MCA 1 PSV: 77.5 CM/S
VAS RIGHT PCA 1 EDV: 20 CM/S
VAS RIGHT PCA 1 MEAN VEL: 37.7 CM/S
VAS RIGHT PCA 1 PSV: 73 CM/S
VAS RIGHT VERTEBRAL EDV TCD: 16.1 CM/S
VAS RIGHT VERTEBRAL MEAN VEL: 36.4 CM/S
VAS RIGHT VERTEBRAL PSV TCD: 76.9 CM/S
VLDLC SERPL CALC-MCNC: 18.4 MG/DL
WBC # BLD AUTO: 13.9 K/UL (ref 3.6–11)

## 2023-10-14 PROCEDURE — 6370000000 HC RX 637 (ALT 250 FOR IP): Performed by: INTERNAL MEDICINE

## 2023-10-14 PROCEDURE — 2500000003 HC RX 250 WO HCPCS: Performed by: NURSE PRACTITIONER

## 2023-10-14 PROCEDURE — 6360000002 HC RX W HCPCS: Performed by: NURSE PRACTITIONER

## 2023-10-14 PROCEDURE — 6370000000 HC RX 637 (ALT 250 FOR IP): Performed by: NURSE PRACTITIONER

## 2023-10-14 PROCEDURE — 2580000003 HC RX 258: Performed by: NURSE PRACTITIONER

## 2023-10-14 PROCEDURE — 36415 COLL VENOUS BLD VENIPUNCTURE: CPT

## 2023-10-14 PROCEDURE — 70450 CT HEAD/BRAIN W/O DYE: CPT

## 2023-10-14 PROCEDURE — 93306 TTE W/DOPPLER COMPLETE: CPT

## 2023-10-14 PROCEDURE — 2000000000 HC ICU R&B

## 2023-10-14 PROCEDURE — 2580000003 HC RX 258: Performed by: INTERNAL MEDICINE

## 2023-10-14 PROCEDURE — 99232 SBSQ HOSP IP/OBS MODERATE 35: CPT

## 2023-10-14 PROCEDURE — 85027 COMPLETE CBC AUTOMATED: CPT

## 2023-10-14 PROCEDURE — 6360000002 HC RX W HCPCS: Performed by: INTERNAL MEDICINE

## 2023-10-14 PROCEDURE — APPNB45 APP NON BILLABLE 31-45 MINUTES

## 2023-10-14 PROCEDURE — A4216 STERILE WATER/SALINE, 10 ML: HCPCS | Performed by: NURSE PRACTITIONER

## 2023-10-14 PROCEDURE — 83735 ASSAY OF MAGNESIUM: CPT

## 2023-10-14 PROCEDURE — 83036 HEMOGLOBIN GLYCOSYLATED A1C: CPT

## 2023-10-14 PROCEDURE — 93886 INTRACRANIAL COMPLETE STUDY: CPT

## 2023-10-14 PROCEDURE — 93306 TTE W/DOPPLER COMPLETE: CPT | Performed by: INTERNAL MEDICINE

## 2023-10-14 PROCEDURE — 80061 LIPID PANEL: CPT

## 2023-10-14 PROCEDURE — 80048 BASIC METABOLIC PNL TOTAL CA: CPT

## 2023-10-14 RX ORDER — POTASSIUM CHLORIDE 750 MG/1
20 TABLET, FILM COATED, EXTENDED RELEASE ORAL ONCE
Status: COMPLETED | OUTPATIENT
Start: 2023-10-14 | End: 2023-10-14

## 2023-10-14 RX ORDER — FENTANYL CITRATE 50 UG/ML
25 INJECTION, SOLUTION INTRAMUSCULAR; INTRAVENOUS EVERY 4 HOURS PRN
Status: DISCONTINUED | OUTPATIENT
Start: 2023-10-14 | End: 2023-10-15

## 2023-10-14 RX ADMIN — SODIUM CHLORIDE: 9 INJECTION, SOLUTION INTRAVENOUS at 04:44

## 2023-10-14 RX ADMIN — LEVETIRACETAM 500 MG: 100 INJECTION, SOLUTION INTRAVENOUS at 17:31

## 2023-10-14 RX ADMIN — LEVETIRACETAM 500 MG: 100 INJECTION, SOLUTION INTRAVENOUS at 06:14

## 2023-10-14 RX ADMIN — NIMODIPINE 60 MG: 30 CAPSULE, LIQUID FILLED ORAL at 11:58

## 2023-10-14 RX ADMIN — NIMODIPINE 60 MG: 30 CAPSULE, LIQUID FILLED ORAL at 00:45

## 2023-10-14 RX ADMIN — ASPIRIN 81 MG CHEWABLE TABLET 81 MG: 81 TABLET CHEWABLE at 09:17

## 2023-10-14 RX ADMIN — ONDANSETRON 4 MG: 2 INJECTION INTRAMUSCULAR; INTRAVENOUS at 09:21

## 2023-10-14 RX ADMIN — SODIUM CHLORIDE, PRESERVATIVE FREE 10 ML: 5 INJECTION INTRAVENOUS at 08:38

## 2023-10-14 RX ADMIN — NIMODIPINE 60 MG: 30 CAPSULE, LIQUID FILLED ORAL at 15:01

## 2023-10-14 RX ADMIN — FAMOTIDINE 20 MG: 10 INJECTION INTRAVENOUS at 08:38

## 2023-10-14 RX ADMIN — BUPROPION HYDROCHLORIDE 150 MG: 150 TABLET, EXTENDED RELEASE ORAL at 08:39

## 2023-10-14 RX ADMIN — SODIUM CHLORIDE: 9 INJECTION, SOLUTION INTRAVENOUS at 15:03

## 2023-10-14 RX ADMIN — SODIUM CHLORIDE, PRESERVATIVE FREE 10 ML: 5 INJECTION INTRAVENOUS at 21:06

## 2023-10-14 RX ADMIN — POTASSIUM CHLORIDE 20 MEQ: 750 TABLET, FILM COATED, EXTENDED RELEASE ORAL at 09:18

## 2023-10-14 RX ADMIN — NIMODIPINE 60 MG: 30 CAPSULE, LIQUID FILLED ORAL at 20:31

## 2023-10-14 RX ADMIN — FENTANYL CITRATE 25 MCG: 0.05 INJECTION, SOLUTION INTRAMUSCULAR; INTRAVENOUS at 15:00

## 2023-10-14 RX ADMIN — METOCLOPRAMIDE 10 MG: 5 INJECTION, SOLUTION INTRAMUSCULAR; INTRAVENOUS at 07:29

## 2023-10-14 RX ADMIN — NIMODIPINE 60 MG: 30 CAPSULE, LIQUID FILLED ORAL at 08:39

## 2023-10-14 RX ADMIN — FENTANYL CITRATE 25 MCG: 0.05 INJECTION, SOLUTION INTRAMUSCULAR; INTRAVENOUS at 03:41

## 2023-10-14 RX ADMIN — NIMODIPINE 60 MG: 30 CAPSULE, LIQUID FILLED ORAL at 04:44

## 2023-10-14 RX ADMIN — BUTALBITAL, ACETAMINOPHEN, AND CAFFEINE 1 TABLET: 50; 325; 40 TABLET ORAL at 17:32

## 2023-10-14 RX ADMIN — LIOTHYRONINE SODIUM 5 MCG: 5 TABLET ORAL at 10:17

## 2023-10-14 RX ADMIN — FAMOTIDINE 20 MG: 10 INJECTION INTRAVENOUS at 21:05

## 2023-10-14 RX ADMIN — FENTANYL CITRATE 25 MCG: 0.05 INJECTION, SOLUTION INTRAMUSCULAR; INTRAVENOUS at 20:42

## 2023-10-14 RX ADMIN — LEVOTHYROXINE SODIUM 125 MCG: 0.12 TABLET ORAL at 08:39

## 2023-10-14 RX ADMIN — ACETAMINOPHEN 650 MG: 325 TABLET ORAL at 19:09

## 2023-10-14 RX ADMIN — BUTALBITAL, ACETAMINOPHEN, AND CAFFEINE 1 TABLET: 50; 325; 40 TABLET ORAL at 12:05

## 2023-10-14 RX ADMIN — BUPROPION HYDROCHLORIDE 150 MG: 150 TABLET, EXTENDED RELEASE ORAL at 21:00

## 2023-10-14 RX ADMIN — METOCLOPRAMIDE 10 MG: 5 INJECTION, SOLUTION INTRAMUSCULAR; INTRAVENOUS at 20:40

## 2023-10-14 RX ADMIN — FENTANYL CITRATE 25 MCG: 0.05 INJECTION, SOLUTION INTRAMUSCULAR; INTRAVENOUS at 09:18

## 2023-10-14 ASSESSMENT — PAIN SCALES - GENERAL
PAINLEVEL_OUTOF10: 9
PAINLEVEL_OUTOF10: 3
PAINLEVEL_OUTOF10: 7
PAINLEVEL_OUTOF10: 9
PAINLEVEL_OUTOF10: 8
PAINLEVEL_OUTOF10: 6
PAINLEVEL_OUTOF10: 10
PAINLEVEL_OUTOF10: 8

## 2023-10-14 ASSESSMENT — PAIN DESCRIPTION - LOCATION
LOCATION: HEAD

## 2023-10-14 ASSESSMENT — PAIN DESCRIPTION - ORIENTATION
ORIENTATION: ANTERIOR

## 2023-10-14 ASSESSMENT — PAIN DESCRIPTION - DESCRIPTORS
DESCRIPTORS: STABBING;THROBBING
DESCRIPTORS: ACHING

## 2023-10-15 ENCOUNTER — APPOINTMENT (OUTPATIENT)
Facility: HOSPITAL | Age: 58
DRG: 022 | End: 2023-10-15
Payer: COMMERCIAL

## 2023-10-15 PROBLEM — R51.9 ACUTE INTRACTABLE HEADACHE: Status: ACTIVE | Noted: 2023-10-15

## 2023-10-15 LAB
ANION GAP SERPL CALC-SCNC: 6 MMOL/L (ref 5–15)
BUN SERPL-MCNC: 11 MG/DL (ref 6–20)
BUN/CREAT SERPL: 15 (ref 12–20)
CALCIUM SERPL-MCNC: 8.4 MG/DL (ref 8.5–10.1)
CHLORIDE SERPL-SCNC: 108 MMOL/L (ref 97–108)
CO2 SERPL-SCNC: 20 MMOL/L (ref 21–32)
CREAT SERPL-MCNC: 0.71 MG/DL (ref 0.55–1.02)
ECHO BSA: 1.77 M2
ERYTHROCYTE [DISTWIDTH] IN BLOOD BY AUTOMATED COUNT: 13.2 % (ref 11.5–14.5)
GLUCOSE BLD STRIP.AUTO-MCNC: 131 MG/DL (ref 65–117)
GLUCOSE SERPL-MCNC: 136 MG/DL (ref 65–100)
HCT VFR BLD AUTO: 37.2 % (ref 35–47)
HGB BLD-MCNC: 12.4 G/DL (ref 11.5–16)
MAGNESIUM SERPL-MCNC: 2 MG/DL (ref 1.6–2.4)
MCH RBC QN AUTO: 32.4 PG (ref 26–34)
MCHC RBC AUTO-ENTMCNC: 33.3 G/DL (ref 30–36.5)
MCV RBC AUTO: 97.1 FL (ref 80–99)
NRBC # BLD: 0 K/UL (ref 0–0.01)
NRBC BLD-RTO: 0 PER 100 WBC
PLATELET # BLD AUTO: 189 K/UL (ref 150–400)
PMV BLD AUTO: 8.5 FL (ref 8.9–12.9)
POTASSIUM SERPL-SCNC: 3.9 MMOL/L (ref 3.5–5.1)
RBC # BLD AUTO: 3.83 M/UL (ref 3.8–5.2)
SERVICE CMNT-IMP: ABNORMAL
SODIUM SERPL-SCNC: 134 MMOL/L (ref 136–145)
VAS BASILAR ARTERY EDV: 21.5 CM/S
VAS BASILAR ARTERY MEAN VEL: 42 CM/S
VAS BASILAR ARTERY PSV: 81.8 CM/S
VAS LEFT ACA EDV: 23.6 CM/S
VAS LEFT ACA MEAN VEL: 41.4 CM/S
VAS LEFT ACA PSV: 77 CM/S
VAS LEFT EX ICA MEAN VEL: 30 CM/S
VAS LEFT ICA DIST EDV: 16.7 CM/S
VAS LEFT ICA DIST PSV: 55.1 CM/S
VAS LEFT ICA EDV: 21 CM/S
VAS LEFT ICA MEAN VEL: 35.7 CM/S
VAS LEFT ICA PSV: 65.1 CM/S
VAS LEFT LINDEGAARD RATIO: 1.6
VAS LEFT MCA 1 EDV: 28.9 CM/S
VAS LEFT MCA 1 MEAN VEL: 47.8 CM/S
VAS LEFT MCA 1 PSV: 85.5 CM/S
VAS LEFT PCA 1 EDV: 22.5 CM/S
VAS LEFT PCA 1 MEAN VEL: 36.5 CM/S
VAS LEFT PCA 1 PSV: 64.5 CM/S
VAS LEFT VERTEBRAL EDV TCD: 16 CM/S
VAS LEFT VERTEBRAL MEAN VEL: 28.8 CM/S
VAS LEFT VERTEBRAL PSV TCD: 54.4 CM/S
VAS RIGHT ACA EDV: 32.2 CM/S
VAS RIGHT ACA MEAN VEL: 53.8 CM/S
VAS RIGHT ACA PSV: 96.9 CM/S
VAS RIGHT EX ICA MEAN VEL: 33 CM/S
VAS RIGHT ICA DIST EDV: 13.1 CM/S
VAS RIGHT ICA DIST PSV: 72.3 CM/S
VAS RIGHT ICA EDV: 14.2 CM/S
VAS RIGHT ICA MEAN VEL: 29.9 CM/S
VAS RIGHT ICA PSV: 61.3 CM/S
VAS RIGHT LINDEGAARD RATIO: 1.2
VAS RIGHT MCA 1 EDV: 24 CM/S
VAS RIGHT MCA 1 MEAN VEL: 41.1 CM/S
VAS RIGHT MCA 1 PSV: 75.2 CM/S
VAS RIGHT PCA 1 EDV: 24.8 CM/S
VAS RIGHT PCA 1 MEAN VEL: 46.7 CM/S
VAS RIGHT PCA 1 PSV: 90.6 CM/S
VAS RIGHT VERTEBRAL EDV TCD: 14.9 CM/S
VAS RIGHT VERTEBRAL MEAN VEL: 33.2 CM/S
VAS RIGHT VERTEBRAL PSV TCD: 69.8 CM/S
WBC # BLD AUTO: 14.5 K/UL (ref 3.6–11)

## 2023-10-15 PROCEDURE — A4216 STERILE WATER/SALINE, 10 ML: HCPCS | Performed by: NURSE PRACTITIONER

## 2023-10-15 PROCEDURE — 93886 INTRACRANIAL COMPLETE STUDY: CPT

## 2023-10-15 PROCEDURE — 2580000003 HC RX 258: Performed by: NURSE PRACTITIONER

## 2023-10-15 PROCEDURE — 83735 ASSAY OF MAGNESIUM: CPT

## 2023-10-15 PROCEDURE — 2500000003 HC RX 250 WO HCPCS: Performed by: NURSE PRACTITIONER

## 2023-10-15 PROCEDURE — 6370000000 HC RX 637 (ALT 250 FOR IP): Performed by: INTERNAL MEDICINE

## 2023-10-15 PROCEDURE — 85027 COMPLETE CBC AUTOMATED: CPT

## 2023-10-15 PROCEDURE — 6360000002 HC RX W HCPCS: Performed by: NURSE PRACTITIONER

## 2023-10-15 PROCEDURE — 80048 BASIC METABOLIC PNL TOTAL CA: CPT

## 2023-10-15 PROCEDURE — 82962 GLUCOSE BLOOD TEST: CPT

## 2023-10-15 PROCEDURE — 97165 OT EVAL LOW COMPLEX 30 MIN: CPT

## 2023-10-15 PROCEDURE — 6370000000 HC RX 637 (ALT 250 FOR IP): Performed by: NURSE PRACTITIONER

## 2023-10-15 PROCEDURE — 97530 THERAPEUTIC ACTIVITIES: CPT

## 2023-10-15 PROCEDURE — 99233 SBSQ HOSP IP/OBS HIGH 50: CPT | Performed by: NURSE PRACTITIONER

## 2023-10-15 PROCEDURE — 97161 PT EVAL LOW COMPLEX 20 MIN: CPT

## 2023-10-15 PROCEDURE — 6360000002 HC RX W HCPCS: Performed by: INTERNAL MEDICINE

## 2023-10-15 PROCEDURE — APPNB45 APP NON BILLABLE 31-45 MINUTES

## 2023-10-15 PROCEDURE — 70450 CT HEAD/BRAIN W/O DYE: CPT

## 2023-10-15 PROCEDURE — 2000000000 HC ICU R&B

## 2023-10-15 PROCEDURE — 36415 COLL VENOUS BLD VENIPUNCTURE: CPT

## 2023-10-15 PROCEDURE — 2580000003 HC RX 258: Performed by: INTERNAL MEDICINE

## 2023-10-15 RX ORDER — MAGNESIUM SULFATE 1 G/100ML
1000 INJECTION INTRAVENOUS ONCE
Status: COMPLETED | OUTPATIENT
Start: 2023-10-15 | End: 2023-10-15

## 2023-10-15 RX ORDER — FENTANYL CITRATE 50 UG/ML
50 INJECTION, SOLUTION INTRAMUSCULAR; INTRAVENOUS EVERY 4 HOURS PRN
Status: DISCONTINUED | OUTPATIENT
Start: 2023-10-15 | End: 2023-10-16

## 2023-10-15 RX ADMIN — BUPROPION HYDROCHLORIDE 150 MG: 150 TABLET, EXTENDED RELEASE ORAL at 20:46

## 2023-10-15 RX ADMIN — LEVETIRACETAM 500 MG: 100 INJECTION, SOLUTION INTRAVENOUS at 18:21

## 2023-10-15 RX ADMIN — LEVOTHYROXINE SODIUM 125 MCG: 0.12 TABLET ORAL at 07:18

## 2023-10-15 RX ADMIN — SODIUM CHLORIDE: 9 INJECTION, SOLUTION INTRAVENOUS at 16:37

## 2023-10-15 RX ADMIN — METOCLOPRAMIDE 10 MG: 5 INJECTION, SOLUTION INTRAMUSCULAR; INTRAVENOUS at 18:29

## 2023-10-15 RX ADMIN — METOCLOPRAMIDE 10 MG: 5 INJECTION, SOLUTION INTRAMUSCULAR; INTRAVENOUS at 02:42

## 2023-10-15 RX ADMIN — BUTALBITAL, ACETAMINOPHEN, AND CAFFEINE 1 TABLET: 50; 325; 40 TABLET ORAL at 00:01

## 2023-10-15 RX ADMIN — NIMODIPINE 60 MG: 30 CAPSULE, LIQUID FILLED ORAL at 04:21

## 2023-10-15 RX ADMIN — SODIUM CHLORIDE, PRESERVATIVE FREE 10 ML: 5 INJECTION INTRAVENOUS at 09:00

## 2023-10-15 RX ADMIN — METOCLOPRAMIDE 10 MG: 5 INJECTION, SOLUTION INTRAMUSCULAR; INTRAVENOUS at 10:12

## 2023-10-15 RX ADMIN — FENTANYL CITRATE 50 MCG: 0.05 INJECTION, SOLUTION INTRAMUSCULAR; INTRAVENOUS at 13:37

## 2023-10-15 RX ADMIN — BUTALBITAL, ACETAMINOPHEN, AND CAFFEINE 1 TABLET: 50; 325; 40 TABLET ORAL at 20:46

## 2023-10-15 RX ADMIN — NIMODIPINE 60 MG: 30 CAPSULE, LIQUID FILLED ORAL at 08:02

## 2023-10-15 RX ADMIN — ASPIRIN 81 MG CHEWABLE TABLET 81 MG: 81 TABLET CHEWABLE at 08:07

## 2023-10-15 RX ADMIN — BUPROPION HYDROCHLORIDE 150 MG: 150 TABLET, EXTENDED RELEASE ORAL at 08:07

## 2023-10-15 RX ADMIN — NIMODIPINE 60 MG: 30 CAPSULE, LIQUID FILLED ORAL at 00:01

## 2023-10-15 RX ADMIN — SODIUM CHLORIDE: 9 INJECTION, SOLUTION INTRAVENOUS at 05:03

## 2023-10-15 RX ADMIN — NIMODIPINE 60 MG: 30 CAPSULE, LIQUID FILLED ORAL at 16:32

## 2023-10-15 RX ADMIN — FAMOTIDINE 20 MG: 10 INJECTION INTRAVENOUS at 20:46

## 2023-10-15 RX ADMIN — NIMODIPINE 60 MG: 30 CAPSULE, LIQUID FILLED ORAL at 20:46

## 2023-10-15 RX ADMIN — LEVETIRACETAM 500 MG: 100 INJECTION, SOLUTION INTRAVENOUS at 05:53

## 2023-10-15 RX ADMIN — BUTALBITAL, ACETAMINOPHEN, AND CAFFEINE 1 TABLET: 50; 325; 40 TABLET ORAL at 12:17

## 2023-10-15 RX ADMIN — FENTANYL CITRATE 50 MCG: 0.05 INJECTION, SOLUTION INTRAMUSCULAR; INTRAVENOUS at 18:27

## 2023-10-15 RX ADMIN — FAMOTIDINE 20 MG: 10 INJECTION INTRAVENOUS at 08:07

## 2023-10-15 RX ADMIN — LIOTHYRONINE SODIUM 5 MCG: 5 TABLET ORAL at 08:07

## 2023-10-15 RX ADMIN — BUTALBITAL, ACETAMINOPHEN, AND CAFFEINE 1 TABLET: 50; 325; 40 TABLET ORAL at 16:42

## 2023-10-15 RX ADMIN — MAGNESIUM SULFATE HEPTAHYDRATE 1000 MG: 1 INJECTION, SOLUTION INTRAVENOUS at 10:10

## 2023-10-15 RX ADMIN — FENTANYL CITRATE 25 MCG: 0.05 INJECTION, SOLUTION INTRAMUSCULAR; INTRAVENOUS at 02:42

## 2023-10-15 RX ADMIN — FENTANYL CITRATE 25 MCG: 0.05 INJECTION, SOLUTION INTRAMUSCULAR; INTRAVENOUS at 07:56

## 2023-10-15 RX ADMIN — SODIUM CHLORIDE, PRESERVATIVE FREE 10 ML: 5 INJECTION INTRAVENOUS at 20:47

## 2023-10-15 RX ADMIN — NIMODIPINE 60 MG: 30 CAPSULE, LIQUID FILLED ORAL at 11:52

## 2023-10-15 ASSESSMENT — PAIN DESCRIPTION - LOCATION
LOCATION: HEAD

## 2023-10-15 ASSESSMENT — PAIN - FUNCTIONAL ASSESSMENT: PAIN_FUNCTIONAL_ASSESSMENT: ACTIVITIES ARE NOT PREVENTED

## 2023-10-15 ASSESSMENT — PAIN DESCRIPTION - DESCRIPTORS
DESCRIPTORS: ACHING

## 2023-10-15 ASSESSMENT — PAIN DESCRIPTION - ORIENTATION
ORIENTATION: ANTERIOR

## 2023-10-15 ASSESSMENT — PAIN SCALES - GENERAL
PAINLEVEL_OUTOF10: 8
PAINLEVEL_OUTOF10: 5
PAINLEVEL_OUTOF10: 2
PAINLEVEL_OUTOF10: 2
PAINLEVEL_OUTOF10: 7
PAINLEVEL_OUTOF10: 10

## 2023-10-16 ENCOUNTER — APPOINTMENT (OUTPATIENT)
Facility: HOSPITAL | Age: 58
DRG: 022 | End: 2023-10-16
Payer: COMMERCIAL

## 2023-10-16 LAB
ANION GAP SERPL CALC-SCNC: 5 MMOL/L (ref 5–15)
ANION GAP SERPL CALC-SCNC: 5 MMOL/L (ref 5–15)
BUN SERPL-MCNC: 9 MG/DL (ref 6–20)
BUN SERPL-MCNC: 9 MG/DL (ref 6–20)
BUN/CREAT SERPL: 15 (ref 12–20)
BUN/CREAT SERPL: 15 (ref 12–20)
CALCIUM SERPL-MCNC: 8 MG/DL (ref 8.5–10.1)
CALCIUM SERPL-MCNC: 8.1 MG/DL (ref 8.5–10.1)
CHLORIDE SERPL-SCNC: 110 MMOL/L (ref 97–108)
CHLORIDE SERPL-SCNC: 113 MMOL/L (ref 97–108)
CO2 SERPL-SCNC: 21 MMOL/L (ref 21–32)
CO2 SERPL-SCNC: 22 MMOL/L (ref 21–32)
CREAT SERPL-MCNC: 0.59 MG/DL (ref 0.55–1.02)
CREAT SERPL-MCNC: 0.62 MG/DL (ref 0.55–1.02)
ERYTHROCYTE [DISTWIDTH] IN BLOOD BY AUTOMATED COUNT: 12.8 % (ref 11.5–14.5)
GLUCOSE BLD STRIP.AUTO-MCNC: 136 MG/DL (ref 65–117)
GLUCOSE SERPL-MCNC: 123 MG/DL (ref 65–100)
GLUCOSE SERPL-MCNC: 94 MG/DL (ref 65–100)
HCT VFR BLD AUTO: 34 % (ref 35–47)
HGB BLD-MCNC: 11.3 G/DL (ref 11.5–16)
MAGNESIUM SERPL-MCNC: 2.4 MG/DL (ref 1.6–2.4)
MCH RBC QN AUTO: 32.5 PG (ref 26–34)
MCHC RBC AUTO-ENTMCNC: 33.2 G/DL (ref 30–36.5)
MCV RBC AUTO: 97.7 FL (ref 80–99)
NRBC # BLD: 0 K/UL (ref 0–0.01)
NRBC BLD-RTO: 0 PER 100 WBC
PLATELET # BLD AUTO: 180 K/UL (ref 150–400)
PMV BLD AUTO: 8.8 FL (ref 8.9–12.9)
POTASSIUM SERPL-SCNC: 2.9 MMOL/L (ref 3.5–5.1)
POTASSIUM SERPL-SCNC: 3.6 MMOL/L (ref 3.5–5.1)
RBC # BLD AUTO: 3.48 M/UL (ref 3.8–5.2)
SERVICE CMNT-IMP: ABNORMAL
SODIUM SERPL-SCNC: 136 MMOL/L (ref 136–145)
SODIUM SERPL-SCNC: 140 MMOL/L (ref 136–145)
WBC # BLD AUTO: 11.6 K/UL (ref 3.6–11)

## 2023-10-16 PROCEDURE — 6370000000 HC RX 637 (ALT 250 FOR IP): Performed by: NURSE PRACTITIONER

## 2023-10-16 PROCEDURE — 6370000000 HC RX 637 (ALT 250 FOR IP): Performed by: INTERNAL MEDICINE

## 2023-10-16 PROCEDURE — 2580000003 HC RX 258: Performed by: NURSE PRACTITIONER

## 2023-10-16 PROCEDURE — 83735 ASSAY OF MAGNESIUM: CPT

## 2023-10-16 PROCEDURE — 2500000003 HC RX 250 WO HCPCS: Performed by: NURSE PRACTITIONER

## 2023-10-16 PROCEDURE — 6360000002 HC RX W HCPCS: Performed by: INTERNAL MEDICINE

## 2023-10-16 PROCEDURE — 6360000002 HC RX W HCPCS: Performed by: NURSE PRACTITIONER

## 2023-10-16 PROCEDURE — 80048 BASIC METABOLIC PNL TOTAL CA: CPT

## 2023-10-16 PROCEDURE — 93886 INTRACRANIAL COMPLETE STUDY: CPT

## 2023-10-16 PROCEDURE — 97530 THERAPEUTIC ACTIVITIES: CPT

## 2023-10-16 PROCEDURE — 2000000000 HC ICU R&B

## 2023-10-16 PROCEDURE — A4216 STERILE WATER/SALINE, 10 ML: HCPCS | Performed by: NURSE PRACTITIONER

## 2023-10-16 PROCEDURE — 82962 GLUCOSE BLOOD TEST: CPT

## 2023-10-16 PROCEDURE — 36415 COLL VENOUS BLD VENIPUNCTURE: CPT

## 2023-10-16 PROCEDURE — 85027 COMPLETE CBC AUTOMATED: CPT

## 2023-10-16 PROCEDURE — 2580000003 HC RX 258: Performed by: INTERNAL MEDICINE

## 2023-10-16 PROCEDURE — 99233 SBSQ HOSP IP/OBS HIGH 50: CPT | Performed by: NURSE PRACTITIONER

## 2023-10-16 RX ORDER — LEVETIRACETAM 500 MG/5ML
1000 INJECTION, SOLUTION, CONCENTRATE INTRAVENOUS EVERY 12 HOURS
Status: DISCONTINUED | OUTPATIENT
Start: 2023-10-16 | End: 2023-10-17

## 2023-10-16 RX ORDER — 0.9 % SODIUM CHLORIDE 0.9 %
500 INTRAVENOUS SOLUTION INTRAVENOUS ONCE
Status: COMPLETED | OUTPATIENT
Start: 2023-10-16 | End: 2023-10-16

## 2023-10-16 RX ORDER — LEVETIRACETAM 500 MG/5ML
500 INJECTION, SOLUTION, CONCENTRATE INTRAVENOUS ONCE
Status: COMPLETED | OUTPATIENT
Start: 2023-10-16 | End: 2023-10-16

## 2023-10-16 RX ORDER — MAGNESIUM SULFATE 1 G/100ML
1000 INJECTION INTRAVENOUS ONCE
Status: COMPLETED | OUTPATIENT
Start: 2023-10-16 | End: 2023-10-16

## 2023-10-16 RX ADMIN — METOCLOPRAMIDE 10 MG: 5 INJECTION, SOLUTION INTRAMUSCULAR; INTRAVENOUS at 05:43

## 2023-10-16 RX ADMIN — NIMODIPINE 60 MG: 30 CAPSULE, LIQUID FILLED ORAL at 08:45

## 2023-10-16 RX ADMIN — NIMODIPINE 60 MG: 30 CAPSULE, LIQUID FILLED ORAL at 04:02

## 2023-10-16 RX ADMIN — ASPIRIN 81 MG CHEWABLE TABLET 81 MG: 81 TABLET CHEWABLE at 08:45

## 2023-10-16 RX ADMIN — LEVETIRACETAM 500 MG: 100 INJECTION, SOLUTION INTRAVENOUS at 11:02

## 2023-10-16 RX ADMIN — NIMODIPINE 60 MG: 30 CAPSULE, LIQUID FILLED ORAL at 12:00

## 2023-10-16 RX ADMIN — SODIUM CHLORIDE 500 ML: 9 INJECTION, SOLUTION INTRAVENOUS at 18:34

## 2023-10-16 RX ADMIN — BUTALBITAL, ACETAMINOPHEN, AND CAFFEINE 1 TABLET: 50; 325; 40 TABLET ORAL at 15:09

## 2023-10-16 RX ADMIN — ACETAMINOPHEN 650 MG: 325 TABLET ORAL at 17:08

## 2023-10-16 RX ADMIN — BUPROPION HYDROCHLORIDE 150 MG: 150 TABLET, EXTENDED RELEASE ORAL at 20:10

## 2023-10-16 RX ADMIN — NIMODIPINE 60 MG: 30 CAPSULE, LIQUID FILLED ORAL at 15:52

## 2023-10-16 RX ADMIN — FENTANYL CITRATE 50 MCG: 0.05 INJECTION, SOLUTION INTRAMUSCULAR; INTRAVENOUS at 00:11

## 2023-10-16 RX ADMIN — BUTALBITAL, ACETAMINOPHEN, AND CAFFEINE 1 TABLET: 50; 325; 40 TABLET ORAL at 19:12

## 2023-10-16 RX ADMIN — SODIUM CHLORIDE: 9 INJECTION, SOLUTION INTRAVENOUS at 03:23

## 2023-10-16 RX ADMIN — NIMODIPINE 60 MG: 30 CAPSULE, LIQUID FILLED ORAL at 20:09

## 2023-10-16 RX ADMIN — FAMOTIDINE 20 MG: 10 INJECTION INTRAVENOUS at 08:45

## 2023-10-16 RX ADMIN — SODIUM CHLORIDE, PRESERVATIVE FREE 10 ML: 5 INJECTION INTRAVENOUS at 20:02

## 2023-10-16 RX ADMIN — ONDANSETRON 4 MG: 2 INJECTION INTRAMUSCULAR; INTRAVENOUS at 21:27

## 2023-10-16 RX ADMIN — METOCLOPRAMIDE 10 MG: 5 INJECTION, SOLUTION INTRAMUSCULAR; INTRAVENOUS at 18:33

## 2023-10-16 RX ADMIN — FENTANYL CITRATE 50 MCG: 0.05 INJECTION, SOLUTION INTRAMUSCULAR; INTRAVENOUS at 04:02

## 2023-10-16 RX ADMIN — SODIUM CHLORIDE: 9 INJECTION, SOLUTION INTRAVENOUS at 13:07

## 2023-10-16 RX ADMIN — LIOTHYRONINE SODIUM 5 MCG: 5 TABLET ORAL at 08:45

## 2023-10-16 RX ADMIN — BUTALBITAL, ACETAMINOPHEN, AND CAFFEINE 1 TABLET: 50; 325; 40 TABLET ORAL at 09:43

## 2023-10-16 RX ADMIN — BUTALBITAL, ACETAMINOPHEN, AND CAFFEINE 1 TABLET: 50; 325; 40 TABLET ORAL at 02:05

## 2023-10-16 RX ADMIN — FENTANYL CITRATE 50 MCG: 0.05 INJECTION, SOLUTION INTRAMUSCULAR; INTRAVENOUS at 08:04

## 2023-10-16 RX ADMIN — MAGNESIUM SULFATE HEPTAHYDRATE 1000 MG: 1 INJECTION, SOLUTION INTRAVENOUS at 20:51

## 2023-10-16 RX ADMIN — ACETAMINOPHEN 650 MG: 325 TABLET ORAL at 08:51

## 2023-10-16 RX ADMIN — BUTALBITAL, ACETAMINOPHEN, AND CAFFEINE 1 TABLET: 50; 325; 40 TABLET ORAL at 05:43

## 2023-10-16 RX ADMIN — BUPROPION HYDROCHLORIDE 150 MG: 150 TABLET, EXTENDED RELEASE ORAL at 08:45

## 2023-10-16 RX ADMIN — FAMOTIDINE 20 MG: 10 INJECTION INTRAVENOUS at 20:09

## 2023-10-16 RX ADMIN — SODIUM CHLORIDE, PRESERVATIVE FREE 10 ML: 5 INJECTION INTRAVENOUS at 08:46

## 2023-10-16 RX ADMIN — LEVETIRACETAM 1000 MG: 100 INJECTION, SOLUTION INTRAVENOUS at 18:19

## 2023-10-16 RX ADMIN — ACETAMINOPHEN 650 MG: 325 TABLET ORAL at 13:06

## 2023-10-16 RX ADMIN — NIMODIPINE 60 MG: 30 CAPSULE, LIQUID FILLED ORAL at 00:11

## 2023-10-16 RX ADMIN — LEVOTHYROXINE SODIUM 125 MCG: 0.12 TABLET ORAL at 05:43

## 2023-10-16 RX ADMIN — LEVETIRACETAM 500 MG: 100 INJECTION, SOLUTION INTRAVENOUS at 05:43

## 2023-10-16 ASSESSMENT — PAIN DESCRIPTION - ORIENTATION
ORIENTATION: ANTERIOR
ORIENTATION: MID;ANTERIOR
ORIENTATION: ANTERIOR

## 2023-10-16 ASSESSMENT — PAIN DESCRIPTION - DESCRIPTORS
DESCRIPTORS: ACHING
DESCRIPTORS: OTHER (COMMENT)
DESCRIPTORS: ACHING
DESCRIPTORS: OTHER (COMMENT)
DESCRIPTORS: OTHER (COMMENT)

## 2023-10-16 ASSESSMENT — PAIN DESCRIPTION - LOCATION
LOCATION: HEAD

## 2023-10-16 ASSESSMENT — PAIN SCALES - GENERAL
PAINLEVEL_OUTOF10: 6
PAINLEVEL_OUTOF10: 3
PAINLEVEL_OUTOF10: 7
PAINLEVEL_OUTOF10: 5
PAINLEVEL_OUTOF10: 1
PAINLEVEL_OUTOF10: 2
PAINLEVEL_OUTOF10: 9
PAINLEVEL_OUTOF10: 6
PAINLEVEL_OUTOF10: 5
PAINLEVEL_OUTOF10: 4
PAINLEVEL_OUTOF10: 3
PAINLEVEL_OUTOF10: 3

## 2023-10-16 ASSESSMENT — PAIN DESCRIPTION - PAIN TYPE: TYPE: ACUTE PAIN

## 2023-10-16 ASSESSMENT — PAIN - FUNCTIONAL ASSESSMENT: PAIN_FUNCTIONAL_ASSESSMENT: ACTIVITIES ARE NOT PREVENTED

## 2023-10-16 NOTE — CARE COORDINATION
Care Management Initial Assessment       RUR: 12% Low   Readmission? No     10/16/23 1138   Service Assessment   Patient Orientation Alert and Oriented;Person;Place;Situation;Self   Cognition Alert   History Provided By Medical Record   Primary Caregiver Self   Support Systems Spouse/Significant Other  Estella Tirado: [phone] H: [phone])   [address] is: Legal Next of [address] [facility] [place]   PCP Verified by CM Yes  (Dr Shantel Conklin)   Last Visit to PCP Within last year   Prior Functional Level Independent in ADLs/IADLs   Current Functional Level Assistance with the following:;Bathing;Dressing; Toileting;Mobility   Can patient return to prior living arrangement Unknown at present   Ability to make needs known: Fair   Family able to assist with home care needs: Yes   Would you like for me to discuss the discharge plan with any other family members/significant others, and if so, who? Yes   Financial Resources None   [employer] None   Social/Functional History   Lives With Spouse     Patient presented to [facility] ED from work with HA. CT: diffuse SAH. Patient taken to IR for cerebral angiogram w/ coil embolization to basilar tip aneurysm. [facility] day 2/21  Daily TCD's  Will follow up with  patient to complete initial assessment.    Etta Broderick RN,Care Management

## 2023-10-17 ENCOUNTER — APPOINTMENT (OUTPATIENT)
Facility: HOSPITAL | Age: 58
DRG: 022 | End: 2023-10-17
Payer: COMMERCIAL

## 2023-10-17 LAB
ANION GAP SERPL CALC-SCNC: 6 MMOL/L (ref 5–15)
BUN SERPL-MCNC: 8 MG/DL (ref 6–20)
BUN/CREAT SERPL: 13 (ref 12–20)
CALCIUM SERPL-MCNC: 7.7 MG/DL (ref 8.5–10.1)
CHLORIDE SERPL-SCNC: 113 MMOL/L (ref 97–108)
CO2 SERPL-SCNC: 22 MMOL/L (ref 21–32)
CREAT SERPL-MCNC: 0.63 MG/DL (ref 0.55–1.02)
ERYTHROCYTE [DISTWIDTH] IN BLOOD BY AUTOMATED COUNT: 12.8 % (ref 11.5–14.5)
GLUCOSE SERPL-MCNC: 95 MG/DL (ref 65–100)
HCT VFR BLD AUTO: 34.3 % (ref 35–47)
HGB BLD-MCNC: 11.5 G/DL (ref 11.5–16)
MAGNESIUM SERPL-MCNC: 2.3 MG/DL (ref 1.6–2.4)
MCH RBC QN AUTO: 32.2 PG (ref 26–34)
MCHC RBC AUTO-ENTMCNC: 33.5 G/DL (ref 30–36.5)
MCV RBC AUTO: 96.1 FL (ref 80–99)
NRBC # BLD: 0 K/UL (ref 0–0.01)
NRBC BLD-RTO: 0 PER 100 WBC
PLATELET # BLD AUTO: 184 K/UL (ref 150–400)
PMV BLD AUTO: 8.8 FL (ref 8.9–12.9)
POTASSIUM SERPL-SCNC: 3.5 MMOL/L (ref 3.5–5.1)
RBC # BLD AUTO: 3.57 M/UL (ref 3.8–5.2)
SODIUM SERPL-SCNC: 141 MMOL/L (ref 136–145)
WBC # BLD AUTO: 10.6 K/UL (ref 3.6–11)

## 2023-10-17 PROCEDURE — 83735 ASSAY OF MAGNESIUM: CPT

## 2023-10-17 PROCEDURE — 97530 THERAPEUTIC ACTIVITIES: CPT

## 2023-10-17 PROCEDURE — 85027 COMPLETE CBC AUTOMATED: CPT

## 2023-10-17 PROCEDURE — 6360000002 HC RX W HCPCS: Performed by: STUDENT IN AN ORGANIZED HEALTH CARE EDUCATION/TRAINING PROGRAM

## 2023-10-17 PROCEDURE — 6370000000 HC RX 637 (ALT 250 FOR IP): Performed by: NURSE PRACTITIONER

## 2023-10-17 PROCEDURE — 2580000003 HC RX 258: Performed by: INTERNAL MEDICINE

## 2023-10-17 PROCEDURE — 6370000000 HC RX 637 (ALT 250 FOR IP): Performed by: STUDENT IN AN ORGANIZED HEALTH CARE EDUCATION/TRAINING PROGRAM

## 2023-10-17 PROCEDURE — 2580000003 HC RX 258: Performed by: NURSE PRACTITIONER

## 2023-10-17 PROCEDURE — 2500000003 HC RX 250 WO HCPCS: Performed by: NURSE PRACTITIONER

## 2023-10-17 PROCEDURE — 2000000000 HC ICU R&B

## 2023-10-17 PROCEDURE — 6360000002 HC RX W HCPCS: Performed by: NURSE PRACTITIONER

## 2023-10-17 PROCEDURE — 93886 INTRACRANIAL COMPLETE STUDY: CPT

## 2023-10-17 PROCEDURE — 99233 SBSQ HOSP IP/OBS HIGH 50: CPT | Performed by: NURSE PRACTITIONER

## 2023-10-17 PROCEDURE — 6370000000 HC RX 637 (ALT 250 FOR IP): Performed by: INTERNAL MEDICINE

## 2023-10-17 PROCEDURE — A4216 STERILE WATER/SALINE, 10 ML: HCPCS | Performed by: NURSE PRACTITIONER

## 2023-10-17 PROCEDURE — 36415 COLL VENOUS BLD VENIPUNCTURE: CPT

## 2023-10-17 PROCEDURE — 70450 CT HEAD/BRAIN W/O DYE: CPT

## 2023-10-17 PROCEDURE — 99291 CRITICAL CARE FIRST HOUR: CPT | Performed by: NURSE PRACTITIONER

## 2023-10-17 PROCEDURE — 80048 BASIC METABOLIC PNL TOTAL CA: CPT

## 2023-10-17 PROCEDURE — 6360000002 HC RX W HCPCS: Performed by: INTERNAL MEDICINE

## 2023-10-17 RX ORDER — LEVETIRACETAM 500 MG/1
1000 TABLET ORAL 2 TIMES DAILY
Status: DISCONTINUED | OUTPATIENT
Start: 2023-10-17 | End: 2023-10-20

## 2023-10-17 RX ORDER — 0.9 % SODIUM CHLORIDE 0.9 %
500 INTRAVENOUS SOLUTION INTRAVENOUS ONCE
Status: COMPLETED | OUTPATIENT
Start: 2023-10-17 | End: 2023-10-17

## 2023-10-17 RX ORDER — SODIUM CHLORIDE, SODIUM LACTATE, POTASSIUM CHLORIDE, CALCIUM CHLORIDE 600; 310; 30; 20 MG/100ML; MG/100ML; MG/100ML; MG/100ML
INJECTION, SOLUTION INTRAVENOUS CONTINUOUS
Status: DISCONTINUED | OUTPATIENT
Start: 2023-10-17 | End: 2023-10-21

## 2023-10-17 RX ORDER — POTASSIUM CHLORIDE 750 MG/1
40 TABLET, FILM COATED, EXTENDED RELEASE ORAL ONCE
Status: COMPLETED | OUTPATIENT
Start: 2023-10-17 | End: 2023-10-17

## 2023-10-17 RX ORDER — PROCHLORPERAZINE EDISYLATE 5 MG/ML
10 INJECTION INTRAMUSCULAR; INTRAVENOUS EVERY 6 HOURS PRN
Status: DISCONTINUED | OUTPATIENT
Start: 2023-10-17 | End: 2023-10-23 | Stop reason: HOSPADM

## 2023-10-17 RX ORDER — DIPHENHYDRAMINE HYDROCHLORIDE 50 MG/ML
10 INJECTION INTRAMUSCULAR; INTRAVENOUS ONCE
Status: COMPLETED | OUTPATIENT
Start: 2023-10-17 | End: 2023-10-17

## 2023-10-17 RX ORDER — FLUDROCORTISONE ACETATE 0.1 MG/1
0.1 TABLET ORAL 2 TIMES DAILY
Status: DISCONTINUED | OUTPATIENT
Start: 2023-10-17 | End: 2023-10-21

## 2023-10-17 RX ADMIN — PROCHLORPERAZINE EDISYLATE 10 MG: 5 INJECTION INTRAMUSCULAR; INTRAVENOUS at 16:00

## 2023-10-17 RX ADMIN — LIOTHYRONINE SODIUM 5 MCG: 5 TABLET ORAL at 08:35

## 2023-10-17 RX ADMIN — METOCLOPRAMIDE 10 MG: 5 INJECTION, SOLUTION INTRAMUSCULAR; INTRAVENOUS at 00:19

## 2023-10-17 RX ADMIN — NIMODIPINE 60 MG: 30 CAPSULE, LIQUID FILLED ORAL at 12:10

## 2023-10-17 RX ADMIN — SODIUM CHLORIDE, PRESERVATIVE FREE 10 ML: 5 INJECTION INTRAVENOUS at 08:37

## 2023-10-17 RX ADMIN — METOCLOPRAMIDE 10 MG: 5 INJECTION, SOLUTION INTRAMUSCULAR; INTRAVENOUS at 18:15

## 2023-10-17 RX ADMIN — NIMODIPINE 60 MG: 30 CAPSULE, LIQUID FILLED ORAL at 20:07

## 2023-10-17 RX ADMIN — BUTALBITAL, ACETAMINOPHEN, AND CAFFEINE 1 TABLET: 50; 325; 40 TABLET ORAL at 08:42

## 2023-10-17 RX ADMIN — BUTALBITAL, ACETAMINOPHEN, AND CAFFEINE 1 TABLET: 50; 325; 40 TABLET ORAL at 02:06

## 2023-10-17 RX ADMIN — DIPHENHYDRAMINE HYDROCHLORIDE 10 MG: 50 INJECTION, SOLUTION INTRAMUSCULAR; INTRAVENOUS at 05:19

## 2023-10-17 RX ADMIN — PROCHLORPERAZINE EDISYLATE 10 MG: 5 INJECTION INTRAMUSCULAR; INTRAVENOUS at 09:23

## 2023-10-17 RX ADMIN — NIMODIPINE 60 MG: 30 CAPSULE, LIQUID FILLED ORAL at 03:52

## 2023-10-17 RX ADMIN — NIMODIPINE 60 MG: 30 CAPSULE, LIQUID FILLED ORAL at 08:36

## 2023-10-17 RX ADMIN — ASPIRIN 81 MG CHEWABLE TABLET 81 MG: 81 TABLET CHEWABLE at 08:35

## 2023-10-17 RX ADMIN — BUPROPION HYDROCHLORIDE 150 MG: 150 TABLET, EXTENDED RELEASE ORAL at 08:35

## 2023-10-17 RX ADMIN — ONDANSETRON 4 MG: 2 INJECTION INTRAMUSCULAR; INTRAVENOUS at 10:13

## 2023-10-17 RX ADMIN — DIPHENHYDRAMINE HYDROCHLORIDE 10 MG: 50 INJECTION, SOLUTION INTRAMUSCULAR; INTRAVENOUS at 22:04

## 2023-10-17 RX ADMIN — ONDANSETRON 4 MG: 2 INJECTION INTRAMUSCULAR; INTRAVENOUS at 03:30

## 2023-10-17 RX ADMIN — NIMODIPINE 60 MG: 30 CAPSULE, LIQUID FILLED ORAL at 15:41

## 2023-10-17 RX ADMIN — ACETAMINOPHEN 650 MG: 325 TABLET ORAL at 22:06

## 2023-10-17 RX ADMIN — FAMOTIDINE 20 MG: 10 INJECTION INTRAVENOUS at 08:36

## 2023-10-17 RX ADMIN — SODIUM CHLORIDE 500 ML: 9 INJECTION, SOLUTION INTRAVENOUS at 00:34

## 2023-10-17 RX ADMIN — FLUDROCORTISONE ACETATE 0.1 MG: 0.1 TABLET ORAL at 22:04

## 2023-10-17 RX ADMIN — SODIUM CHLORIDE, POTASSIUM CHLORIDE, SODIUM LACTATE AND CALCIUM CHLORIDE: 600; 310; 30; 20 INJECTION, SOLUTION INTRAVENOUS at 12:41

## 2023-10-17 RX ADMIN — LEVETIRACETAM 1000 MG: 100 INJECTION, SOLUTION INTRAVENOUS at 06:13

## 2023-10-17 RX ADMIN — BUPROPION HYDROCHLORIDE 150 MG: 150 TABLET, EXTENDED RELEASE ORAL at 22:04

## 2023-10-17 RX ADMIN — LEVOTHYROXINE SODIUM 125 MCG: 0.12 TABLET ORAL at 06:17

## 2023-10-17 RX ADMIN — POTASSIUM BICARBONATE 40 MEQ: 782 TABLET, EFFERVESCENT ORAL at 00:33

## 2023-10-17 RX ADMIN — METOCLOPRAMIDE 10 MG: 5 INJECTION, SOLUTION INTRAMUSCULAR; INTRAVENOUS at 12:35

## 2023-10-17 RX ADMIN — METOCLOPRAMIDE 10 MG: 5 INJECTION, SOLUTION INTRAMUSCULAR; INTRAVENOUS at 06:13

## 2023-10-17 RX ADMIN — BUTALBITAL, ACETAMINOPHEN, AND CAFFEINE 1 TABLET: 50; 325; 40 TABLET ORAL at 20:07

## 2023-10-17 RX ADMIN — LEVETIRACETAM 1000 MG: 500 TABLET, FILM COATED ORAL at 21:30

## 2023-10-17 RX ADMIN — ACETAMINOPHEN 650 MG: 325 TABLET ORAL at 17:23

## 2023-10-17 RX ADMIN — BUTALBITAL, ACETAMINOPHEN, AND CAFFEINE 1 TABLET: 50; 325; 40 TABLET ORAL at 12:10

## 2023-10-17 RX ADMIN — FLUDROCORTISONE ACETATE 0.1 MG: 0.1 TABLET ORAL at 10:06

## 2023-10-17 RX ADMIN — NIMODIPINE 60 MG: 30 CAPSULE, LIQUID FILLED ORAL at 00:26

## 2023-10-17 RX ADMIN — BUTALBITAL, ACETAMINOPHEN, AND CAFFEINE 1 TABLET: 50; 325; 40 TABLET ORAL at 16:00

## 2023-10-17 RX ADMIN — POTASSIUM CHLORIDE 40 MEQ: 750 TABLET, FILM COATED, EXTENDED RELEASE ORAL at 12:10

## 2023-10-17 RX ADMIN — SODIUM CHLORIDE: 9 INJECTION, SOLUTION INTRAVENOUS at 10:11

## 2023-10-17 ASSESSMENT — PAIN DESCRIPTION - LOCATION
LOCATION: HEAD

## 2023-10-17 ASSESSMENT — PAIN DESCRIPTION - DESCRIPTORS
DESCRIPTORS: ACHING;THROBBING
DESCRIPTORS: ACHING
DESCRIPTORS: ACHING
DESCRIPTORS: ACHING;THROBBING
DESCRIPTORS: ACHING;THROBBING
DESCRIPTORS: ACHING
DESCRIPTORS: THROBBING;ACHING
DESCRIPTORS: ACHING

## 2023-10-17 ASSESSMENT — PAIN DESCRIPTION - PAIN TYPE
TYPE: ACUTE PAIN

## 2023-10-17 ASSESSMENT — PAIN SCALES - GENERAL
PAINLEVEL_OUTOF10: 5
PAINLEVEL_OUTOF10: 4
PAINLEVEL_OUTOF10: 5
PAINLEVEL_OUTOF10: 4
PAINLEVEL_OUTOF10: 6
PAINLEVEL_OUTOF10: 8
PAINLEVEL_OUTOF10: 4
PAINLEVEL_OUTOF10: 7
PAINLEVEL_OUTOF10: 5
PAINLEVEL_OUTOF10: 5
PAINLEVEL_OUTOF10: 2

## 2023-10-17 ASSESSMENT — PAIN DESCRIPTION - ORIENTATION
ORIENTATION: ANTERIOR

## 2023-10-18 ENCOUNTER — APPOINTMENT (OUTPATIENT)
Facility: HOSPITAL | Age: 58
DRG: 022 | End: 2023-10-18
Payer: COMMERCIAL

## 2023-10-18 LAB
ANION GAP SERPL CALC-SCNC: 10 MMOL/L (ref 5–15)
BUN SERPL-MCNC: 6 MG/DL (ref 6–20)
BUN/CREAT SERPL: 8 (ref 12–20)
CALCIUM SERPL-MCNC: 8.7 MG/DL (ref 8.5–10.1)
CHLORIDE SERPL-SCNC: 113 MMOL/L (ref 97–108)
CO2 SERPL-SCNC: 18 MMOL/L (ref 21–32)
CREAT SERPL-MCNC: 0.71 MG/DL (ref 0.55–1.02)
ECHO BSA: 1.77 M2
ECHO BSA: 1.77 M2
ERYTHROCYTE [DISTWIDTH] IN BLOOD BY AUTOMATED COUNT: 13 % (ref 11.5–14.5)
GLUCOSE SERPL-MCNC: 91 MG/DL (ref 65–100)
HCT VFR BLD AUTO: 35.1 % (ref 35–47)
HGB BLD-MCNC: 11.8 G/DL (ref 11.5–16)
MAGNESIUM SERPL-MCNC: 1.8 MG/DL (ref 1.6–2.4)
MCH RBC QN AUTO: 32.1 PG (ref 26–34)
MCHC RBC AUTO-ENTMCNC: 33.6 G/DL (ref 30–36.5)
MCV RBC AUTO: 95.4 FL (ref 80–99)
NRBC # BLD: 0 K/UL (ref 0–0.01)
NRBC BLD-RTO: 0 PER 100 WBC
PHOSPHATE SERPL-MCNC: 1.4 MG/DL (ref 2.6–4.7)
PHOSPHATE SERPL-MCNC: 1.7 MG/DL (ref 2.6–4.7)
PLATELET # BLD AUTO: 209 K/UL (ref 150–400)
PMV BLD AUTO: 9.2 FL (ref 8.9–12.9)
POTASSIUM SERPL-SCNC: 3.6 MMOL/L (ref 3.5–5.1)
RBC # BLD AUTO: 3.68 M/UL (ref 3.8–5.2)
SODIUM SERPL-SCNC: 141 MMOL/L (ref 136–145)
VAS BASILAR ARTERY EDV: 21.2 CM/S
VAS BASILAR ARTERY EDV: 41.9 CM/S
VAS BASILAR ARTERY MEAN VEL: 41 CM/S
VAS BASILAR ARTERY MEAN VEL: 65 CM/S
VAS BASILAR ARTERY PSV: 111.8 CM/S
VAS BASILAR ARTERY PSV: 81.5 CM/S
VAS LEFT ACA EDV: 22.6 CM/S
VAS LEFT ACA EDV: 27.8 CM/S
VAS LEFT ACA MEAN VEL: 40.5 CM/S
VAS LEFT ACA MEAN VEL: 43.2 CM/S
VAS LEFT ACA PSV: 73.9 CM/S
VAS LEFT ACA PSV: 76.4 CM/S
VAS LEFT EX ICA MEAN VEL: 28 CM/S
VAS LEFT EX ICA MEAN VEL: 29 CM/S
VAS LEFT ICA DIST EDV: 15.2 CM/S
VAS LEFT ICA DIST EDV: 18.6 CM/S
VAS LEFT ICA DIST PSV: 48.1 CM/S
VAS LEFT ICA DIST PSV: 53.5 CM/S
VAS LEFT ICA EDV: 21.5 CM/S
VAS LEFT ICA EDV: 24.8 CM/S
VAS LEFT ICA MEAN VEL: 37.2 CM/S
VAS LEFT ICA MEAN VEL: 39.1 CM/S
VAS LEFT ICA PSV: 67.6 CM/S
VAS LEFT ICA PSV: 68.7 CM/S
VAS LEFT LINDEGAARD RATIO: 1.9
VAS LEFT LINDEGAARD RATIO: 2
VAS LEFT MCA 1 EDV: 28.9 CM/S
VAS LEFT MCA 1 EDV: 36.9 CM/S
VAS LEFT MCA 1 MEAN VEL: 51.6 CM/S
VAS LEFT MCA 1 MEAN VEL: 59.2 CM/S
VAS LEFT MCA 1 PSV: 103.9 CM/S
VAS LEFT MCA 1 PSV: 96.9 CM/S
VAS LEFT PCA 1 EDV: 19.5 CM/S
VAS LEFT PCA 1 EDV: 24.5 CM/S
VAS LEFT PCA 1 MEAN VEL: 34.4 CM/S
VAS LEFT PCA 1 MEAN VEL: 42.8 CM/S
VAS LEFT PCA 1 PSV: 64.2 CM/S
VAS LEFT PCA 1 PSV: 79.3 CM/S
VAS LEFT VERTEBRAL EDV TCD: 18.8 CM/S
VAS LEFT VERTEBRAL EDV TCD: 19 CM/S
VAS LEFT VERTEBRAL MEAN VEL: 30.7 CM/S
VAS LEFT VERTEBRAL MEAN VEL: 31.8 CM/S
VAS LEFT VERTEBRAL PSV TCD: 54.4 CM/S
VAS LEFT VERTEBRAL PSV TCD: 57.4 CM/S
VAS RIGHT ACA EDV: 28.9 CM/S
VAS RIGHT ACA EDV: 33.2 CM/S
VAS RIGHT ACA MEAN VEL: 50.9 CM/S
VAS RIGHT ACA MEAN VEL: 52.6 CM/S
VAS RIGHT ACA PSV: 91.5 CM/S
VAS RIGHT ACA PSV: 95 CM/S
VAS RIGHT EX ICA MEAN VEL: 29 CM/S
VAS RIGHT EX ICA MEAN VEL: 33 CM/S
VAS RIGHT ICA DIST EDV: 16.2 CM/S
VAS RIGHT ICA DIST EDV: 17.9 CM/S
VAS RIGHT ICA DIST PSV: 54.8 CM/S
VAS RIGHT ICA DIST PSV: 62.6 CM/S
VAS RIGHT ICA EDV: 17.9 CM/S
VAS RIGHT ICA EDV: 22.6 CM/S
VAS RIGHT ICA MEAN VEL: 35.5 CM/S
VAS RIGHT ICA MEAN VEL: 37.2 CM/S
VAS RIGHT ICA PSV: 66.5 CM/S
VAS RIGHT ICA PSV: 70.6 CM/S
VAS RIGHT LINDEGAARD RATIO: 1.4
VAS RIGHT LINDEGAARD RATIO: 1.6
VAS RIGHT MCA 1 EDV: 26.7 CM/S
VAS RIGHT MCA 1 EDV: 30.6 CM/S
VAS RIGHT MCA 1 MEAN VEL: 45.7 CM/S
VAS RIGHT MCA 1 MEAN VEL: 47 CM/S
VAS RIGHT MCA 1 PSV: 79.8 CM/S
VAS RIGHT MCA 1 PSV: 83.7 CM/S
VAS RIGHT PCA 1 EDV: 24.5 CM/S
VAS RIGHT PCA 1 EDV: 32.5 CM/S
VAS RIGHT PCA 1 MEAN VEL: 46.1 CM/S
VAS RIGHT PCA 1 MEAN VEL: 53 CM/S
VAS RIGHT PCA 1 PSV: 89.2 CM/S
VAS RIGHT PCA 1 PSV: 93.9 CM/S
VAS RIGHT VERTEBRAL EDV TCD: 18.8 CM/S
VAS RIGHT VERTEBRAL EDV TCD: 24.5 CM/S
VAS RIGHT VERTEBRAL MEAN VEL: 34 CM/S
VAS RIGHT VERTEBRAL MEAN VEL: 39.1 CM/S
VAS RIGHT VERTEBRAL PSV TCD: 64.5 CM/S
VAS RIGHT VERTEBRAL PSV TCD: 68.4 CM/S
WBC # BLD AUTO: 10.6 K/UL (ref 3.6–11)

## 2023-10-18 PROCEDURE — 84100 ASSAY OF PHOSPHORUS: CPT

## 2023-10-18 PROCEDURE — 97116 GAIT TRAINING THERAPY: CPT

## 2023-10-18 PROCEDURE — 85027 COMPLETE CBC AUTOMATED: CPT

## 2023-10-18 PROCEDURE — 97530 THERAPEUTIC ACTIVITIES: CPT

## 2023-10-18 PROCEDURE — 80048 BASIC METABOLIC PNL TOTAL CA: CPT

## 2023-10-18 PROCEDURE — 6370000000 HC RX 637 (ALT 250 FOR IP): Performed by: NURSE PRACTITIONER

## 2023-10-18 PROCEDURE — 6370000000 HC RX 637 (ALT 250 FOR IP): Performed by: INTERNAL MEDICINE

## 2023-10-18 PROCEDURE — 2580000003 HC RX 258: Performed by: NURSE PRACTITIONER

## 2023-10-18 PROCEDURE — 6360000002 HC RX W HCPCS: Performed by: NURSE PRACTITIONER

## 2023-10-18 PROCEDURE — 6370000000 HC RX 637 (ALT 250 FOR IP): Performed by: STUDENT IN AN ORGANIZED HEALTH CARE EDUCATION/TRAINING PROGRAM

## 2023-10-18 PROCEDURE — 93886 INTRACRANIAL COMPLETE STUDY: CPT

## 2023-10-18 PROCEDURE — 97535 SELF CARE MNGMENT TRAINING: CPT

## 2023-10-18 PROCEDURE — 6360000002 HC RX W HCPCS: Performed by: STUDENT IN AN ORGANIZED HEALTH CARE EDUCATION/TRAINING PROGRAM

## 2023-10-18 PROCEDURE — 83735 ASSAY OF MAGNESIUM: CPT

## 2023-10-18 PROCEDURE — 36415 COLL VENOUS BLD VENIPUNCTURE: CPT

## 2023-10-18 PROCEDURE — 99233 SBSQ HOSP IP/OBS HIGH 50: CPT | Performed by: NURSE PRACTITIONER

## 2023-10-18 PROCEDURE — 2580000003 HC RX 258: Performed by: INTERNAL MEDICINE

## 2023-10-18 PROCEDURE — 2000000000 HC ICU R&B

## 2023-10-18 RX ORDER — POLYETHYLENE GLYCOL 3350 17 G/17G
17 POWDER, FOR SOLUTION ORAL DAILY
Status: DISCONTINUED | OUTPATIENT
Start: 2023-10-18 | End: 2023-10-23 | Stop reason: HOSPADM

## 2023-10-18 RX ORDER — LIDOCAINE 4 G/G
1 PATCH TOPICAL DAILY
Status: DISCONTINUED | OUTPATIENT
Start: 2023-10-18 | End: 2023-10-23

## 2023-10-18 RX ORDER — GABAPENTIN 100 MG/1
200 CAPSULE ORAL NIGHTLY
Status: DISCONTINUED | OUTPATIENT
Start: 2023-10-18 | End: 2023-10-23

## 2023-10-18 RX ORDER — SENNA AND DOCUSATE SODIUM 50; 8.6 MG/1; MG/1
2 TABLET, FILM COATED ORAL DAILY PRN
Status: DISCONTINUED | OUTPATIENT
Start: 2023-10-18 | End: 2023-10-23 | Stop reason: HOSPADM

## 2023-10-18 RX ORDER — CETIRIZINE HYDROCHLORIDE 10 MG/1
10 TABLET ORAL DAILY
Status: DISCONTINUED | OUTPATIENT
Start: 2023-10-18 | End: 2023-10-23 | Stop reason: HOSPADM

## 2023-10-18 RX ADMIN — NIMODIPINE 60 MG: 30 CAPSULE, LIQUID FILLED ORAL at 04:02

## 2023-10-18 RX ADMIN — METOCLOPRAMIDE 10 MG: 5 INJECTION, SOLUTION INTRAMUSCULAR; INTRAVENOUS at 11:19

## 2023-10-18 RX ADMIN — NIMODIPINE 60 MG: 30 CAPSULE, LIQUID FILLED ORAL at 20:21

## 2023-10-18 RX ADMIN — FLUDROCORTISONE ACETATE 0.1 MG: 0.1 TABLET ORAL at 08:02

## 2023-10-18 RX ADMIN — CETIRIZINE HYDROCHLORIDE 10 MG: 10 TABLET, FILM COATED ORAL at 21:47

## 2023-10-18 RX ADMIN — LEVOTHYROXINE SODIUM 125 MCG: 0.12 TABLET ORAL at 06:08

## 2023-10-18 RX ADMIN — NIMODIPINE 60 MG: 30 CAPSULE, LIQUID FILLED ORAL at 11:15

## 2023-10-18 RX ADMIN — BUPROPION HYDROCHLORIDE 150 MG: 150 TABLET, EXTENDED RELEASE ORAL at 20:22

## 2023-10-18 RX ADMIN — LEVETIRACETAM 1000 MG: 500 TABLET, FILM COATED ORAL at 20:30

## 2023-10-18 RX ADMIN — BUPROPION HYDROCHLORIDE 150 MG: 150 TABLET, EXTENDED RELEASE ORAL at 08:02

## 2023-10-18 RX ADMIN — PROCHLORPERAZINE EDISYLATE 10 MG: 5 INJECTION INTRAMUSCULAR; INTRAVENOUS at 16:01

## 2023-10-18 RX ADMIN — METOCLOPRAMIDE 10 MG: 5 INJECTION, SOLUTION INTRAMUSCULAR; INTRAVENOUS at 23:39

## 2023-10-18 RX ADMIN — FLUDROCORTISONE ACETATE 0.1 MG: 0.1 TABLET ORAL at 20:22

## 2023-10-18 RX ADMIN — PROCHLORPERAZINE EDISYLATE 10 MG: 5 INJECTION INTRAMUSCULAR; INTRAVENOUS at 22:41

## 2023-10-18 RX ADMIN — BUTALBITAL, ACETAMINOPHEN, AND CAFFEINE 1 TABLET: 50; 325; 40 TABLET ORAL at 06:08

## 2023-10-18 RX ADMIN — LEVETIRACETAM 1000 MG: 500 TABLET, FILM COATED ORAL at 09:08

## 2023-10-18 RX ADMIN — METOCLOPRAMIDE 10 MG: 5 INJECTION, SOLUTION INTRAMUSCULAR; INTRAVENOUS at 17:01

## 2023-10-18 RX ADMIN — NIMODIPINE 60 MG: 30 CAPSULE, LIQUID FILLED ORAL at 23:39

## 2023-10-18 RX ADMIN — GABAPENTIN 200 MG: 100 CAPSULE ORAL at 21:56

## 2023-10-18 RX ADMIN — SODIUM CHLORIDE, PRESERVATIVE FREE 10 ML: 5 INJECTION INTRAVENOUS at 08:16

## 2023-10-18 RX ADMIN — SODIUM CHLORIDE, POTASSIUM CHLORIDE, SODIUM LACTATE AND CALCIUM CHLORIDE: 600; 310; 30; 20 INJECTION, SOLUTION INTRAVENOUS at 17:54

## 2023-10-18 RX ADMIN — BUTALBITAL, ACETAMINOPHEN, AND CAFFEINE 1 TABLET: 50; 325; 40 TABLET ORAL at 17:01

## 2023-10-18 RX ADMIN — NIMODIPINE 60 MG: 30 CAPSULE, LIQUID FILLED ORAL at 16:01

## 2023-10-18 RX ADMIN — BUTALBITAL, ACETAMINOPHEN, AND CAFFEINE 1 TABLET: 50; 325; 40 TABLET ORAL at 11:18

## 2023-10-18 RX ADMIN — NIMODIPINE 60 MG: 30 CAPSULE, LIQUID FILLED ORAL at 08:01

## 2023-10-18 RX ADMIN — POLYETHYLENE GLYCOL 3350 17 G: 17 POWDER, FOR SOLUTION ORAL at 11:16

## 2023-10-18 RX ADMIN — ACETAMINOPHEN 650 MG: 325 TABLET ORAL at 04:03

## 2023-10-18 RX ADMIN — METOCLOPRAMIDE 10 MG: 5 INJECTION, SOLUTION INTRAMUSCULAR; INTRAVENOUS at 00:02

## 2023-10-18 RX ADMIN — LIOTHYRONINE SODIUM 5 MCG: 5 TABLET ORAL at 11:15

## 2023-10-18 RX ADMIN — PROCHLORPERAZINE EDISYLATE 10 MG: 5 INJECTION INTRAMUSCULAR; INTRAVENOUS at 09:08

## 2023-10-18 RX ADMIN — METOCLOPRAMIDE 10 MG: 5 INJECTION, SOLUTION INTRAMUSCULAR; INTRAVENOUS at 06:08

## 2023-10-18 RX ADMIN — SODIUM CHLORIDE, PRESERVATIVE FREE 10 ML: 5 INJECTION INTRAVENOUS at 20:22

## 2023-10-18 RX ADMIN — ACETAMINOPHEN 650 MG: 325 TABLET ORAL at 20:22

## 2023-10-18 RX ADMIN — ASPIRIN 81 MG CHEWABLE TABLET 81 MG: 81 TABLET CHEWABLE at 08:02

## 2023-10-18 RX ADMIN — BUTALBITAL, ACETAMINOPHEN, AND CAFFEINE 1 TABLET: 50; 325; 40 TABLET ORAL at 00:02

## 2023-10-18 RX ADMIN — BUTALBITAL, ACETAMINOPHEN, AND CAFFEINE 1 TABLET: 50; 325; 40 TABLET ORAL at 23:39

## 2023-10-18 RX ADMIN — NIMODIPINE 60 MG: 30 CAPSULE, LIQUID FILLED ORAL at 00:03

## 2023-10-18 RX ADMIN — SENNOSIDES AND DOCUSATE SODIUM 2 TABLET: 8.6; 5 TABLET ORAL at 11:16

## 2023-10-18 ASSESSMENT — PAIN DESCRIPTION - DESCRIPTORS
DESCRIPTORS: THROBBING;ACHING
DESCRIPTORS: ACHING;THROBBING

## 2023-10-18 ASSESSMENT — PAIN DESCRIPTION - LOCATION
LOCATION: HEAD;BACK
LOCATION: HEAD

## 2023-10-18 ASSESSMENT — PAIN SCALES - GENERAL
PAINLEVEL_OUTOF10: 6
PAINLEVEL_OUTOF10: 5
PAINLEVEL_OUTOF10: 9
PAINLEVEL_OUTOF10: 8
PAINLEVEL_OUTOF10: 5
PAINLEVEL_OUTOF10: 3
PAINLEVEL_OUTOF10: 4
PAINLEVEL_OUTOF10: 6
PAINLEVEL_OUTOF10: 3

## 2023-10-18 ASSESSMENT — PAIN DESCRIPTION - PAIN TYPE
TYPE: ACUTE PAIN;SURGICAL PAIN
TYPE: ACUTE PAIN;SURGICAL PAIN
TYPE: ACUTE PAIN

## 2023-10-18 ASSESSMENT — PAIN DESCRIPTION - FREQUENCY
FREQUENCY: INTERMITTENT

## 2023-10-18 ASSESSMENT — PAIN DESCRIPTION - ONSET
ONSET: ON-GOING

## 2023-10-18 ASSESSMENT — PAIN DESCRIPTION - ORIENTATION
ORIENTATION: ANTERIOR
ORIENTATION: ANTERIOR;POSTERIOR;UPPER
ORIENTATION: ANTERIOR
ORIENTATION: ANTERIOR

## 2023-10-18 ASSESSMENT — PAIN - FUNCTIONAL ASSESSMENT
PAIN_FUNCTIONAL_ASSESSMENT: ACTIVITIES ARE NOT PREVENTED

## 2023-10-18 NOTE — CARE COORDINATION
Transition of Care Plan:    RUR: 8%, Low  Prior Level of Functioning: Independent  Disposition: Home vs Rehab  Follow up appointments: PCP, Neurology  DME needed: NA  Transportation at discharge: Spouse   Caregiver Contact: Spouse Kenzie Aiken [phone]  Discharge Caregiver contacted prior to discharge? No  Care Conference needed? No    Patient is s/p cerebral angiogram w/ coil embolization to basilar tip aneurysm   Per patient it has been over a year since she has seen her PCP Dr Conklin, she [facility] [address] in [place]. She has no previous HH/DME or IPR needs. Lives with her  Kenzie Aiken in a 3 level home with 3 CHATA. Bathroom on the 2nd floor 10-12 steps.    Barriers to Discharge   Keppra increased for concern of recurrent seizures  Daily TCD's  [facility]  Q1 hour neuro checks   Pacheco Miller RN,Care Management

## 2023-10-19 ENCOUNTER — APPOINTMENT (OUTPATIENT)
Facility: HOSPITAL | Age: 58
DRG: 022 | End: 2023-10-19
Payer: COMMERCIAL

## 2023-10-19 LAB
ANION GAP SERPL CALC-SCNC: 3 MMOL/L (ref 5–15)
BUN SERPL-MCNC: 6 MG/DL (ref 6–20)
BUN/CREAT SERPL: 9 (ref 12–20)
CALCIUM SERPL-MCNC: 7.7 MG/DL (ref 8.5–10.1)
CHLORIDE SERPL-SCNC: 110 MMOL/L (ref 97–108)
CO2 SERPL-SCNC: 28 MMOL/L (ref 21–32)
CREAT SERPL-MCNC: 0.67 MG/DL (ref 0.55–1.02)
ECHO BSA: 1.77 M2
ECHO BSA: 1.77 M2
ERYTHROCYTE [DISTWIDTH] IN BLOOD BY AUTOMATED COUNT: 12.9 % (ref 11.5–14.5)
GLUCOSE SERPL-MCNC: 97 MG/DL (ref 65–100)
HCT VFR BLD AUTO: 35 % (ref 35–47)
HGB BLD-MCNC: 11.6 G/DL (ref 11.5–16)
MAGNESIUM SERPL-MCNC: 2.1 MG/DL (ref 1.6–2.4)
MCH RBC QN AUTO: 32.3 PG (ref 26–34)
MCHC RBC AUTO-ENTMCNC: 33.1 G/DL (ref 30–36.5)
MCV RBC AUTO: 97.5 FL (ref 80–99)
NRBC # BLD: 0 K/UL (ref 0–0.01)
NRBC BLD-RTO: 0 PER 100 WBC
PHOSPHATE SERPL-MCNC: 3.7 MG/DL (ref 2.6–4.7)
PLATELET # BLD AUTO: 199 K/UL (ref 150–400)
PMV BLD AUTO: 8.8 FL (ref 8.9–12.9)
POTASSIUM SERPL-SCNC: 3.6 MMOL/L (ref 3.5–5.1)
RBC # BLD AUTO: 3.59 M/UL (ref 3.8–5.2)
SODIUM SERPL-SCNC: 141 MMOL/L (ref 136–145)
VAS BASILAR ARTERY EDV: 21.5 CM/S
VAS BASILAR ARTERY EDV: 47 CM/S
VAS BASILAR ARTERY MEAN VEL: 41 CM/S
VAS BASILAR ARTERY MEAN VEL: 72 CM/S
VAS BASILAR ARTERY PSV: 120.9 CM/S
VAS BASILAR ARTERY PSV: 77.5 CM/S
VAS LEFT ACA EDV: 27 CM/S
VAS LEFT ACA EDV: 27 CM/S
VAS LEFT ACA MEAN VEL: 43.5 CM/S
VAS LEFT ACA MEAN VEL: 44.2 CM/S
VAS LEFT ACA PSV: 76.4 CM/S
VAS LEFT ACA PSV: 78.6 CM/S
VAS LEFT EX ICA MEAN VEL: 26 CM/S
VAS LEFT EX ICA MEAN VEL: 33 CM/S
VAS LEFT ICA DIST EDV: 15.6 CM/S
VAS LEFT ICA DIST EDV: 19.3 CM/S
VAS LEFT ICA DIST PSV: 47.4 CM/S
VAS LEFT ICA DIST PSV: 62.1 CM/S
VAS LEFT ICA EDV: 16 CM/S
VAS LEFT ICA EDV: 17.4 CM/S
VAS LEFT ICA MEAN VEL: 27.3 CM/S
VAS LEFT ICA MEAN VEL: 31.7 CM/S
VAS LEFT ICA PSV: 50 CM/S
VAS LEFT ICA PSV: 60.2 CM/S
VAS LEFT LINDEGAARD RATIO: 1.8
VAS LEFT LINDEGAARD RATIO: 2.3
VAS LEFT MCA 1 EDV: 34.7 CM/S
VAS LEFT MCA 1 EDV: 36.5 CM/S
VAS LEFT MCA 1 MEAN VEL: 58.5 CM/S
VAS LEFT MCA 1 MEAN VEL: 59.6 CM/S
VAS LEFT MCA 1 PSV: 105.7 CM/S
VAS LEFT MCA 1 PSV: 106.1 CM/S
VAS LEFT PCA 1 EDV: 23.7 CM/S
VAS LEFT PCA 1 EDV: 30.3 CM/S
VAS LEFT PCA 1 MEAN VEL: 39.4 CM/S
VAS LEFT PCA 1 MEAN VEL: 46 CM/S
VAS LEFT PCA 1 PSV: 70.9 CM/S
VAS LEFT PCA 1 PSV: 77.5 CM/S
VAS LEFT VERTEBRAL EDV TCD: 14.9 CM/S
VAS LEFT VERTEBRAL EDV TCD: 21.5 CM/S
VAS LEFT VERTEBRAL MEAN VEL: 23.7 CM/S
VAS LEFT VERTEBRAL MEAN VEL: 38 CM/S
VAS LEFT VERTEBRAL PSV TCD: 41.3 CM/S
VAS LEFT VERTEBRAL PSV TCD: 70.9 CM/S
VAS RIGHT ACA EDV: 26.4 CM/S
VAS RIGHT ACA EDV: 32.2 CM/S
VAS RIGHT ACA MEAN VEL: 49 CM/S
VAS RIGHT ACA MEAN VEL: 50.5 CM/S
VAS RIGHT ACA PSV: 87 CM/S
VAS RIGHT ACA PSV: 94.3 CM/S
VAS RIGHT EX ICA MEAN VEL: 29 CM/S
VAS RIGHT EX ICA MEAN VEL: 33 CM/S
VAS RIGHT ICA DIST EDV: 15.6 CM/S
VAS RIGHT ICA DIST EDV: 18.2 CM/S
VAS RIGHT ICA DIST PSV: 56.2 CM/S
VAS RIGHT ICA DIST PSV: 63.2 CM/S
VAS RIGHT ICA EDV: 16.7 CM/S
VAS RIGHT ICA EDV: 21.5 CM/S
VAS RIGHT ICA MEAN VEL: 29.9 CM/S
VAS RIGHT ICA MEAN VEL: 36.1 CM/S
VAS RIGHT ICA PSV: 56.2 CM/S
VAS RIGHT ICA PSV: 65.4 CM/S
VAS RIGHT LINDEGAARD RATIO: 1.5
VAS RIGHT LINDEGAARD RATIO: 1.6
VAS RIGHT MCA 1 EDV: 23.6 CM/S
VAS RIGHT MCA 1 EDV: 30.1 CM/S
VAS RIGHT MCA 1 MEAN VEL: 44.6 CM/S
VAS RIGHT MCA 1 MEAN VEL: 49 CM/S
VAS RIGHT MCA 1 PSV: 86.6 CM/S
VAS RIGHT MCA 1 PSV: 86.7 CM/S
VAS RIGHT PCA 1 EDV: 26.7 CM/S
VAS RIGHT PCA 1 EDV: 27 CM/S
VAS RIGHT PCA 1 MEAN VEL: 45.3 CM/S
VAS RIGHT PCA 1 MEAN VEL: 46.4 CM/S
VAS RIGHT PCA 1 PSV: 81.9 CM/S
VAS RIGHT PCA 1 PSV: 85.9 CM/S
VAS RIGHT VERTEBRAL EDV TCD: 18.2 CM/S
VAS RIGHT VERTEBRAL EDV TCD: 22.6 CM/S
VAS RIGHT VERTEBRAL MEAN VEL: 32.8 CM/S
VAS RIGHT VERTEBRAL MEAN VEL: 36.5 CM/S
VAS RIGHT VERTEBRAL PSV TCD: 62.1 CM/S
VAS RIGHT VERTEBRAL PSV TCD: 64.3 CM/S
WBC # BLD AUTO: 9.3 K/UL (ref 3.6–11)

## 2023-10-19 PROCEDURE — 2580000003 HC RX 258: Performed by: INTERNAL MEDICINE

## 2023-10-19 PROCEDURE — 80048 BASIC METABOLIC PNL TOTAL CA: CPT

## 2023-10-19 PROCEDURE — 36415 COLL VENOUS BLD VENIPUNCTURE: CPT

## 2023-10-19 PROCEDURE — 83735 ASSAY OF MAGNESIUM: CPT

## 2023-10-19 PROCEDURE — 2580000003 HC RX 258: Performed by: NURSE PRACTITIONER

## 2023-10-19 PROCEDURE — 6370000000 HC RX 637 (ALT 250 FOR IP): Performed by: NURSE PRACTITIONER

## 2023-10-19 PROCEDURE — 93886 INTRACRANIAL COMPLETE STUDY: CPT

## 2023-10-19 PROCEDURE — 84100 ASSAY OF PHOSPHORUS: CPT

## 2023-10-19 PROCEDURE — 85027 COMPLETE CBC AUTOMATED: CPT

## 2023-10-19 PROCEDURE — 2500000003 HC RX 250 WO HCPCS: Performed by: NURSE PRACTITIONER

## 2023-10-19 PROCEDURE — 99233 SBSQ HOSP IP/OBS HIGH 50: CPT | Performed by: NURSE PRACTITIONER

## 2023-10-19 PROCEDURE — 6360000002 HC RX W HCPCS: Performed by: STUDENT IN AN ORGANIZED HEALTH CARE EDUCATION/TRAINING PROGRAM

## 2023-10-19 PROCEDURE — 6370000000 HC RX 637 (ALT 250 FOR IP): Performed by: INTERNAL MEDICINE

## 2023-10-19 PROCEDURE — 2000000000 HC ICU R&B

## 2023-10-19 PROCEDURE — 2580000003 HC RX 258: Performed by: STUDENT IN AN ORGANIZED HEALTH CARE EDUCATION/TRAINING PROGRAM

## 2023-10-19 PROCEDURE — 6370000000 HC RX 637 (ALT 250 FOR IP): Performed by: STUDENT IN AN ORGANIZED HEALTH CARE EDUCATION/TRAINING PROGRAM

## 2023-10-19 PROCEDURE — 6360000002 HC RX W HCPCS: Performed by: NURSE PRACTITIONER

## 2023-10-19 RX ORDER — SODIUM CHLORIDE, SODIUM LACTATE, POTASSIUM CHLORIDE, AND CALCIUM CHLORIDE .6; .31; .03; .02 G/100ML; G/100ML; G/100ML; G/100ML
500 INJECTION, SOLUTION INTRAVENOUS ONCE
Status: COMPLETED | OUTPATIENT
Start: 2023-10-19 | End: 2023-10-19

## 2023-10-19 RX ORDER — POTASSIUM CHLORIDE 750 MG/1
20 TABLET, FILM COATED, EXTENDED RELEASE ORAL DAILY
Status: COMPLETED | OUTPATIENT
Start: 2023-10-19 | End: 2023-10-19

## 2023-10-19 RX ORDER — MAGNESIUM SULFATE 1 G/100ML
1000 INJECTION INTRAVENOUS ONCE
Status: COMPLETED | OUTPATIENT
Start: 2023-10-19 | End: 2023-10-19

## 2023-10-19 RX ORDER — OXYCODONE HYDROCHLORIDE 5 MG/1
5 TABLET ORAL EVERY 6 HOURS PRN
Status: DISCONTINUED | OUTPATIENT
Start: 2023-10-19 | End: 2023-10-23 | Stop reason: HOSPADM

## 2023-10-19 RX ADMIN — NIMODIPINE 60 MG: 30 CAPSULE, LIQUID FILLED ORAL at 20:13

## 2023-10-19 RX ADMIN — POTASSIUM PHOSPHATE, MONOBASIC POTASSIUM PHOSPHATE, DIBASIC 20 MMOL: 224; 236 INJECTION, SOLUTION, CONCENTRATE INTRAVENOUS at 01:11

## 2023-10-19 RX ADMIN — METOCLOPRAMIDE 10 MG: 5 INJECTION, SOLUTION INTRAMUSCULAR; INTRAVENOUS at 17:54

## 2023-10-19 RX ADMIN — NIMODIPINE 60 MG: 30 CAPSULE, LIQUID FILLED ORAL at 15:55

## 2023-10-19 RX ADMIN — PROCHLORPERAZINE EDISYLATE 10 MG: 5 INJECTION INTRAMUSCULAR; INTRAVENOUS at 15:31

## 2023-10-19 RX ADMIN — METOCLOPRAMIDE 10 MG: 5 INJECTION, SOLUTION INTRAMUSCULAR; INTRAVENOUS at 06:04

## 2023-10-19 RX ADMIN — GABAPENTIN 200 MG: 100 CAPSULE ORAL at 20:30

## 2023-10-19 RX ADMIN — LEVOTHYROXINE SODIUM 125 MCG: 0.12 TABLET ORAL at 06:04

## 2023-10-19 RX ADMIN — LEVETIRACETAM 1000 MG: 500 TABLET, FILM COATED ORAL at 20:30

## 2023-10-19 RX ADMIN — CETIRIZINE HYDROCHLORIDE 10 MG: 10 TABLET, FILM COATED ORAL at 07:56

## 2023-10-19 RX ADMIN — BUTALBITAL, ACETAMINOPHEN, AND CAFFEINE 1 TABLET: 50; 325; 40 TABLET ORAL at 11:49

## 2023-10-19 RX ADMIN — PROCHLORPERAZINE EDISYLATE 10 MG: 5 INJECTION INTRAMUSCULAR; INTRAVENOUS at 07:56

## 2023-10-19 RX ADMIN — ACETAMINOPHEN 650 MG: 325 TABLET ORAL at 15:31

## 2023-10-19 RX ADMIN — LIOTHYRONINE SODIUM 5 MCG: 5 TABLET ORAL at 07:56

## 2023-10-19 RX ADMIN — ONDANSETRON 4 MG: 4 TABLET, ORALLY DISINTEGRATING ORAL at 00:18

## 2023-10-19 RX ADMIN — NIMODIPINE 60 MG: 30 CAPSULE, LIQUID FILLED ORAL at 07:56

## 2023-10-19 RX ADMIN — FLUDROCORTISONE ACETATE 0.1 MG: 0.1 TABLET ORAL at 07:56

## 2023-10-19 RX ADMIN — SODIUM CHLORIDE, POTASSIUM CHLORIDE, SODIUM LACTATE AND CALCIUM CHLORIDE: 600; 310; 30; 20 INJECTION, SOLUTION INTRAVENOUS at 20:19

## 2023-10-19 RX ADMIN — FLUDROCORTISONE ACETATE 0.1 MG: 0.1 TABLET ORAL at 20:13

## 2023-10-19 RX ADMIN — MAGNESIUM SULFATE HEPTAHYDRATE 1000 MG: 1 INJECTION, SOLUTION INTRAVENOUS at 01:11

## 2023-10-19 RX ADMIN — SODIUM CHLORIDE, POTASSIUM CHLORIDE, SODIUM LACTATE AND CALCIUM CHLORIDE: 600; 310; 30; 20 INJECTION, SOLUTION INTRAVENOUS at 04:04

## 2023-10-19 RX ADMIN — BUTALBITAL, ACETAMINOPHEN, AND CAFFEINE 1 TABLET: 50; 325; 40 TABLET ORAL at 17:54

## 2023-10-19 RX ADMIN — SODIUM CHLORIDE, POTASSIUM CHLORIDE, SODIUM LACTATE AND CALCIUM CHLORIDE: 600; 310; 30; 20 INJECTION, SOLUTION INTRAVENOUS at 04:05

## 2023-10-19 RX ADMIN — SENNOSIDES AND DOCUSATE SODIUM 2 TABLET: 8.6; 5 TABLET ORAL at 15:08

## 2023-10-19 RX ADMIN — NIMODIPINE 60 MG: 30 CAPSULE, LIQUID FILLED ORAL at 04:09

## 2023-10-19 RX ADMIN — NIMODIPINE 60 MG: 30 CAPSULE, LIQUID FILLED ORAL at 11:49

## 2023-10-19 RX ADMIN — SODIUM CHLORIDE, PRESERVATIVE FREE 10 ML: 5 INJECTION INTRAVENOUS at 20:28

## 2023-10-19 RX ADMIN — OXYCODONE HYDROCHLORIDE 5 MG: 5 TABLET ORAL at 09:32

## 2023-10-19 RX ADMIN — METOCLOPRAMIDE 10 MG: 5 INJECTION, SOLUTION INTRAMUSCULAR; INTRAVENOUS at 11:49

## 2023-10-19 RX ADMIN — SODIUM CHLORIDE, POTASSIUM CHLORIDE, SODIUM LACTATE AND CALCIUM CHLORIDE 500 ML: 600; 310; 30; 20 INJECTION, SOLUTION INTRAVENOUS at 15:30

## 2023-10-19 RX ADMIN — PROCHLORPERAZINE EDISYLATE 10 MG: 5 INJECTION INTRAMUSCULAR; INTRAVENOUS at 20:14

## 2023-10-19 RX ADMIN — LEVETIRACETAM 1000 MG: 500 TABLET, FILM COATED ORAL at 08:45

## 2023-10-19 RX ADMIN — BUTALBITAL, ACETAMINOPHEN, AND CAFFEINE 1 TABLET: 50; 325; 40 TABLET ORAL at 06:04

## 2023-10-19 RX ADMIN — ACETAMINOPHEN 650 MG: 325 TABLET ORAL at 07:55

## 2023-10-19 RX ADMIN — POTASSIUM CHLORIDE 20 MEQ: 750 TABLET, FILM COATED, EXTENDED RELEASE ORAL at 07:56

## 2023-10-19 RX ADMIN — ACETAMINOPHEN 650 MG: 325 TABLET ORAL at 20:13

## 2023-10-19 RX ADMIN — ASPIRIN 81 MG CHEWABLE TABLET 81 MG: 81 TABLET CHEWABLE at 07:56

## 2023-10-19 RX ADMIN — BUPROPION HYDROCHLORIDE 150 MG: 150 TABLET, EXTENDED RELEASE ORAL at 07:56

## 2023-10-19 RX ADMIN — BUPROPION HYDROCHLORIDE 150 MG: 150 TABLET, EXTENDED RELEASE ORAL at 20:13

## 2023-10-19 RX ADMIN — SODIUM CHLORIDE, PRESERVATIVE FREE 10 ML: 5 INJECTION INTRAVENOUS at 08:13

## 2023-10-19 RX ADMIN — POLYETHYLENE GLYCOL 3350 17 G: 17 POWDER, FOR SOLUTION ORAL at 07:56

## 2023-10-19 ASSESSMENT — PAIN DESCRIPTION - ORIENTATION
ORIENTATION: ANTERIOR
ORIENTATION: ANTERIOR

## 2023-10-19 ASSESSMENT — PAIN DESCRIPTION - LOCATION
LOCATION: HEAD
LOCATION: HEAD
LOCATION: HEAD;BACK

## 2023-10-19 ASSESSMENT — PAIN SCALES - GENERAL
PAINLEVEL_OUTOF10: 4
PAINLEVEL_OUTOF10: 9
PAINLEVEL_OUTOF10: 7
PAINLEVEL_OUTOF10: 4
PAINLEVEL_OUTOF10: 6
PAINLEVEL_OUTOF10: 7

## 2023-10-19 ASSESSMENT — PAIN DESCRIPTION - DESCRIPTORS
DESCRIPTORS: ACHING;THROBBING
DESCRIPTORS: ACHING

## 2023-10-20 ENCOUNTER — APPOINTMENT (OUTPATIENT)
Facility: HOSPITAL | Age: 58
DRG: 022 | End: 2023-10-20
Payer: COMMERCIAL

## 2023-10-20 LAB
ANION GAP SERPL CALC-SCNC: 6 MMOL/L (ref 5–15)
BUN SERPL-MCNC: 7 MG/DL (ref 6–20)
BUN/CREAT SERPL: 11 (ref 12–20)
CALCIUM SERPL-MCNC: 8.1 MG/DL (ref 8.5–10.1)
CHLORIDE SERPL-SCNC: 110 MMOL/L (ref 97–108)
CO2 SERPL-SCNC: 26 MMOL/L (ref 21–32)
CREAT SERPL-MCNC: 0.66 MG/DL (ref 0.55–1.02)
ERYTHROCYTE [DISTWIDTH] IN BLOOD BY AUTOMATED COUNT: 13.2 % (ref 11.5–14.5)
GLUCOSE BLD STRIP.AUTO-MCNC: 99 MG/DL (ref 65–117)
GLUCOSE SERPL-MCNC: 90 MG/DL (ref 65–100)
HCT VFR BLD AUTO: 36.2 % (ref 35–47)
HGB BLD-MCNC: 12.2 G/DL (ref 11.5–16)
MAGNESIUM SERPL-MCNC: 1.9 MG/DL (ref 1.6–2.4)
MCH RBC QN AUTO: 32.4 PG (ref 26–34)
MCHC RBC AUTO-ENTMCNC: 33.7 G/DL (ref 30–36.5)
MCV RBC AUTO: 96 FL (ref 80–99)
NRBC # BLD: 0 K/UL (ref 0–0.01)
NRBC BLD-RTO: 0 PER 100 WBC
PHOSPHATE SERPL-MCNC: 2.8 MG/DL (ref 2.6–4.7)
PLATELET # BLD AUTO: 223 K/UL (ref 150–400)
PMV BLD AUTO: 8.7 FL (ref 8.9–12.9)
POTASSIUM SERPL-SCNC: 3.4 MMOL/L (ref 3.5–5.1)
RBC # BLD AUTO: 3.77 M/UL (ref 3.8–5.2)
SERVICE CMNT-IMP: NORMAL
SODIUM SERPL-SCNC: 142 MMOL/L (ref 136–145)
WBC # BLD AUTO: 9.3 K/UL (ref 3.6–11)

## 2023-10-20 PROCEDURE — 2060000000 HC ICU INTERMEDIATE R&B

## 2023-10-20 PROCEDURE — 97535 SELF CARE MNGMENT TRAINING: CPT

## 2023-10-20 PROCEDURE — 99233 SBSQ HOSP IP/OBS HIGH 50: CPT | Performed by: NURSE PRACTITIONER

## 2023-10-20 PROCEDURE — 84100 ASSAY OF PHOSPHORUS: CPT

## 2023-10-20 PROCEDURE — 6360000002 HC RX W HCPCS: Performed by: STUDENT IN AN ORGANIZED HEALTH CARE EDUCATION/TRAINING PROGRAM

## 2023-10-20 PROCEDURE — 6360000002 HC RX W HCPCS: Performed by: PSYCHIATRY & NEUROLOGY

## 2023-10-20 PROCEDURE — 75894 X-RAYS TRANSCATH THERAPY: CPT

## 2023-10-20 PROCEDURE — 6370000000 HC RX 637 (ALT 250 FOR IP): Performed by: STUDENT IN AN ORGANIZED HEALTH CARE EDUCATION/TRAINING PROGRAM

## 2023-10-20 PROCEDURE — 6370000000 HC RX 637 (ALT 250 FOR IP): Performed by: NURSE PRACTITIONER

## 2023-10-20 PROCEDURE — 85027 COMPLETE CBC AUTOMATED: CPT

## 2023-10-20 PROCEDURE — 6370000000 HC RX 637 (ALT 250 FOR IP): Performed by: INTERNAL MEDICINE

## 2023-10-20 PROCEDURE — 2500000003 HC RX 250 WO HCPCS: Performed by: PSYCHIATRY & NEUROLOGY

## 2023-10-20 PROCEDURE — 6370000000 HC RX 637 (ALT 250 FOR IP): Performed by: PSYCHIATRY & NEUROLOGY

## 2023-10-20 PROCEDURE — 83735 ASSAY OF MAGNESIUM: CPT

## 2023-10-20 PROCEDURE — 2580000003 HC RX 258: Performed by: INTERNAL MEDICINE

## 2023-10-20 PROCEDURE — 6360000004 HC RX CONTRAST MEDICATION: Performed by: PSYCHIATRY & NEUROLOGY

## 2023-10-20 PROCEDURE — 36415 COLL VENOUS BLD VENIPUNCTURE: CPT

## 2023-10-20 PROCEDURE — 6360000002 HC RX W HCPCS: Performed by: NURSE PRACTITIONER

## 2023-10-20 PROCEDURE — B31D1ZZ FLUOROSCOPY OF RIGHT VERTEBRAL ARTERY USING LOW OSMOLAR CONTRAST: ICD-10-PCS | Performed by: RADIOLOGY

## 2023-10-20 PROCEDURE — 2580000003 HC RX 258: Performed by: PSYCHIATRY & NEUROLOGY

## 2023-10-20 PROCEDURE — 97530 THERAPEUTIC ACTIVITIES: CPT

## 2023-10-20 PROCEDURE — 82962 GLUCOSE BLOOD TEST: CPT

## 2023-10-20 PROCEDURE — B31C1ZZ FLUOROSCOPY OF BILATERAL EXTERNAL CAROTID ARTERIES USING LOW OSMOLAR CONTRAST: ICD-10-PCS | Performed by: RADIOLOGY

## 2023-10-20 PROCEDURE — B3181ZZ FLUOROSCOPY OF BILATERAL INTERNAL CAROTID ARTERIES USING LOW OSMOLAR CONTRAST: ICD-10-PCS | Performed by: RADIOLOGY

## 2023-10-20 PROCEDURE — 70450 CT HEAD/BRAIN W/O DYE: CPT

## 2023-10-20 PROCEDURE — 2580000003 HC RX 258: Performed by: NURSE PRACTITIONER

## 2023-10-20 PROCEDURE — 80048 BASIC METABOLIC PNL TOTAL CA: CPT

## 2023-10-20 PROCEDURE — 6360000002 HC RX W HCPCS: Performed by: INTERNAL MEDICINE

## 2023-10-20 RX ORDER — MIDAZOLAM HYDROCHLORIDE 2 MG/2ML
INJECTION, SOLUTION INTRAMUSCULAR; INTRAVENOUS PRN
Status: COMPLETED | OUTPATIENT
Start: 2023-10-20 | End: 2023-10-20

## 2023-10-20 RX ORDER — HEPARIN SODIUM 1000 [USP'U]/ML
INJECTION, SOLUTION INTRAVENOUS; SUBCUTANEOUS PRN
Status: COMPLETED | OUTPATIENT
Start: 2023-10-20 | End: 2023-10-20

## 2023-10-20 RX ORDER — POTASSIUM CHLORIDE 750 MG/1
40 TABLET, FILM COATED, EXTENDED RELEASE ORAL ONCE
Status: COMPLETED | OUTPATIENT
Start: 2023-10-20 | End: 2023-10-20

## 2023-10-20 RX ORDER — FENTANYL CITRATE 50 UG/ML
INJECTION, SOLUTION INTRAMUSCULAR; INTRAVENOUS PRN
Status: COMPLETED | OUTPATIENT
Start: 2023-10-20 | End: 2023-10-20

## 2023-10-20 RX ORDER — VERAPAMIL HYDROCHLORIDE 2.5 MG/ML
INJECTION, SOLUTION INTRAVENOUS PRN
Status: COMPLETED | OUTPATIENT
Start: 2023-10-20 | End: 2023-10-20

## 2023-10-20 RX ORDER — LIDOCAINE HYDROCHLORIDE 10 MG/ML
INJECTION, SOLUTION EPIDURAL; INFILTRATION; INTRACAUDAL; PERINEURAL PRN
Status: COMPLETED | OUTPATIENT
Start: 2023-10-20 | End: 2023-10-20

## 2023-10-20 RX ORDER — LIDOCAINE 40 MG/G
CREAM TOPICAL PRN
Status: COMPLETED | OUTPATIENT
Start: 2023-10-20 | End: 2023-10-20

## 2023-10-20 RX ADMIN — SODIUM CHLORIDE, PRESERVATIVE FREE 10 ML: 5 INJECTION INTRAVENOUS at 09:19

## 2023-10-20 RX ADMIN — METOCLOPRAMIDE 10 MG: 5 INJECTION, SOLUTION INTRAMUSCULAR; INTRAVENOUS at 18:34

## 2023-10-20 RX ADMIN — FLUDROCORTISONE ACETATE 0.1 MG: 0.1 TABLET ORAL at 21:40

## 2023-10-20 RX ADMIN — NIMODIPINE 60 MG: 30 CAPSULE, LIQUID FILLED ORAL at 11:54

## 2023-10-20 RX ADMIN — ACETAMINOPHEN 650 MG: 325 TABLET ORAL at 10:11

## 2023-10-20 RX ADMIN — METOCLOPRAMIDE 10 MG: 5 INJECTION, SOLUTION INTRAMUSCULAR; INTRAVENOUS at 06:45

## 2023-10-20 RX ADMIN — MIDAZOLAM HYDROCHLORIDE 1 MG: 1 INJECTION, SOLUTION INTRAMUSCULAR; INTRAVENOUS at 08:32

## 2023-10-20 RX ADMIN — Medication 100 MCG: at 08:34

## 2023-10-20 RX ADMIN — LIOTHYRONINE SODIUM 5 MCG: 5 TABLET ORAL at 09:11

## 2023-10-20 RX ADMIN — VERAPAMIL HYDROCHLORIDE 2.5 MG: 2.5 INJECTION, SOLUTION INTRAVENOUS at 08:37

## 2023-10-20 RX ADMIN — NIMODIPINE 60 MG: 30 CAPSULE, LIQUID FILLED ORAL at 00:06

## 2023-10-20 RX ADMIN — LIDOCAINE 3 G: 40 CREAM TOPICAL at 08:24

## 2023-10-20 RX ADMIN — BUTALBITAL, ACETAMINOPHEN, AND CAFFEINE 1 TABLET: 50; 325; 40 TABLET ORAL at 06:45

## 2023-10-20 RX ADMIN — ACETAMINOPHEN 650 MG: 325 TABLET ORAL at 02:58

## 2023-10-20 RX ADMIN — NITROGLYCERIN 0.5 INCH: 20 OINTMENT TOPICAL at 08:25

## 2023-10-20 RX ADMIN — NIMODIPINE 60 MG: 30 CAPSULE, LIQUID FILLED ORAL at 09:10

## 2023-10-20 RX ADMIN — SODIUM BICARBONATE 1 MEQ: 84 INJECTION INTRAVENOUS at 08:35

## 2023-10-20 RX ADMIN — POTASSIUM CHLORIDE 40 MEQ: 750 TABLET, FILM COATED, EXTENDED RELEASE ORAL at 12:54

## 2023-10-20 RX ADMIN — METOCLOPRAMIDE 10 MG: 5 INJECTION, SOLUTION INTRAMUSCULAR; INTRAVENOUS at 00:06

## 2023-10-20 RX ADMIN — NIMODIPINE 60 MG: 30 CAPSULE, LIQUID FILLED ORAL at 02:58

## 2023-10-20 RX ADMIN — PROCHLORPERAZINE EDISYLATE 10 MG: 5 INJECTION INTRAMUSCULAR; INTRAVENOUS at 10:11

## 2023-10-20 RX ADMIN — GABAPENTIN 200 MG: 100 CAPSULE ORAL at 21:40

## 2023-10-20 RX ADMIN — HEPARIN SODIUM: 1000 INJECTION INTRAVENOUS; SUBCUTANEOUS at 08:41

## 2023-10-20 RX ADMIN — POLYETHYLENE GLYCOL 3350 17 G: 17 POWDER, FOR SOLUTION ORAL at 09:12

## 2023-10-20 RX ADMIN — BUPROPION HYDROCHLORIDE 150 MG: 150 TABLET, EXTENDED RELEASE ORAL at 21:40

## 2023-10-20 RX ADMIN — FLUDROCORTISONE ACETATE 0.1 MG: 0.1 TABLET ORAL at 09:11

## 2023-10-20 RX ADMIN — BUTALBITAL, ACETAMINOPHEN, AND CAFFEINE 1 TABLET: 50; 325; 40 TABLET ORAL at 12:02

## 2023-10-20 RX ADMIN — BUPROPION HYDROCHLORIDE 150 MG: 150 TABLET, EXTENDED RELEASE ORAL at 09:11

## 2023-10-20 RX ADMIN — BUTALBITAL, ACETAMINOPHEN, AND CAFFEINE 1 TABLET: 50; 325; 40 TABLET ORAL at 18:34

## 2023-10-20 RX ADMIN — FENTANYL CITRATE 25 MCG: 0.05 INJECTION, SOLUTION INTRAMUSCULAR; INTRAVENOUS at 08:39

## 2023-10-20 RX ADMIN — PROCHLORPERAZINE EDISYLATE 10 MG: 5 INJECTION INTRAMUSCULAR; INTRAVENOUS at 17:02

## 2023-10-20 RX ADMIN — BUTALBITAL, ACETAMINOPHEN, AND CAFFEINE 1 TABLET: 50; 325; 40 TABLET ORAL at 00:06

## 2023-10-20 RX ADMIN — FENTANYL CITRATE 50 MCG: 0.05 INJECTION, SOLUTION INTRAMUSCULAR; INTRAVENOUS at 08:32

## 2023-10-20 RX ADMIN — LEVOTHYROXINE SODIUM 125 MCG: 0.12 TABLET ORAL at 06:45

## 2023-10-20 RX ADMIN — ONDANSETRON 4 MG: 2 INJECTION INTRAMUSCULAR; INTRAVENOUS at 19:38

## 2023-10-20 RX ADMIN — SODIUM CHLORIDE, POTASSIUM CHLORIDE, SODIUM LACTATE AND CALCIUM CHLORIDE: 600; 310; 30; 20 INJECTION, SOLUTION INTRAVENOUS at 05:16

## 2023-10-20 RX ADMIN — SODIUM CHLORIDE, POTASSIUM CHLORIDE, SODIUM LACTATE AND CALCIUM CHLORIDE: 600; 310; 30; 20 INJECTION, SOLUTION INTRAVENOUS at 18:35

## 2023-10-20 RX ADMIN — HEPARIN SODIUM 2000 UNITS: 1000 INJECTION INTRAVENOUS; SUBCUTANEOUS at 08:37

## 2023-10-20 RX ADMIN — NIMODIPINE 60 MG: 30 CAPSULE, LIQUID FILLED ORAL at 15:09

## 2023-10-20 RX ADMIN — IOPAMIDOL 40 ML: 612 INJECTION, SOLUTION INTRAVENOUS at 08:46

## 2023-10-20 RX ADMIN — METOCLOPRAMIDE 10 MG: 5 INJECTION, SOLUTION INTRAMUSCULAR; INTRAVENOUS at 12:02

## 2023-10-20 RX ADMIN — PROCHLORPERAZINE EDISYLATE 10 MG: 5 INJECTION INTRAMUSCULAR; INTRAVENOUS at 02:58

## 2023-10-20 RX ADMIN — ACETAMINOPHEN 650 MG: 325 TABLET ORAL at 17:02

## 2023-10-20 RX ADMIN — LIDOCAINE HYDROCHLORIDE 1 ML: 10 INJECTION, SOLUTION EPIDURAL; INFILTRATION; INTRACAUDAL; PERINEURAL at 08:34

## 2023-10-20 RX ADMIN — CETIRIZINE HYDROCHLORIDE 10 MG: 10 TABLET, FILM COATED ORAL at 09:11

## 2023-10-20 RX ADMIN — HEPARIN SODIUM: 1000 INJECTION INTRAVENOUS; SUBCUTANEOUS at 08:42

## 2023-10-20 RX ADMIN — NIMODIPINE 60 MG: 30 CAPSULE, LIQUID FILLED ORAL at 21:40

## 2023-10-20 RX ADMIN — ASPIRIN 81 MG CHEWABLE TABLET 81 MG: 81 TABLET CHEWABLE at 09:11

## 2023-10-20 ASSESSMENT — PAIN SCALES - GENERAL
PAINLEVEL_OUTOF10: 5
PAINLEVEL_OUTOF10: 4
PAINLEVEL_OUTOF10: 3
PAINLEVEL_OUTOF10: 4
PAINLEVEL_OUTOF10: 5
PAINLEVEL_OUTOF10: 5
PAINLEVEL_OUTOF10: 3
PAINLEVEL_OUTOF10: 3
PAINLEVEL_OUTOF10: 0
PAINLEVEL_OUTOF10: 7

## 2023-10-20 ASSESSMENT — PULMONARY FUNCTION TESTS
PIF_VALUE: 0

## 2023-10-20 ASSESSMENT — PAIN DESCRIPTION - ORIENTATION
ORIENTATION: ANTERIOR

## 2023-10-20 ASSESSMENT — PAIN DESCRIPTION - DESCRIPTORS
DESCRIPTORS: ACHING

## 2023-10-20 ASSESSMENT — PAIN DESCRIPTION - LOCATION
LOCATION: HEAD;NECK
LOCATION: HEAD

## 2023-10-20 NOTE — H&P
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 CT head revealed extensive subarachnoid hemorrhage. CTA reveals a basilar tip aneurysm measuring 7 x 8 x 9 mm with features suggestive of recent bleed and acute subarachnoid hemorrhage although no obvious contrast extravasation to suggest ongoing bleeding. No evidence of LVO or intracranial enhancing lesion. CT cervical spine does not reveal any evidence of fracture. Neurosurgery and neuro interventional radiology consulted, patient is transferred to [facility] intensive care for further observation and management.      COMPREHENSIVE ASSESSMENT & PLAN:SYSTEM BASED      24 HOUR EVENTS: As above     NEUROLOGICAL:   -Close neurologic monitoring per NIS  -Goal SBP < 160, PRN nicardipine/hydralazine  -Q1 hour NC  -Q4 hour nimodipine  -Twice daily Keppra for seizure prevention  -Continue daily aspirin  -PT/OT  -Neurosurgery and NIS following     PULMONOLOGY:   -Goal oxygen saturation > 92%  -Tobacco use disorder, encourage smoking cessation     CARDIOVASCULAR:   -Goal SBP goal SBP < 160 mmHg  -Goal MAP > 65  -Troponin 168, trend  -Lipid panel in the AM     GASTROINTESTINAL   -Famotidine for GI prophylaxis     -Nurse perform bedside swallow prior to advancing diet  -As needed Zofran
Shift Summary    Patient medicated for severe H/A pain as ordered throughout the day. Patient is sleeping on and off. No changes in neuro assessment. V/S WDL. Refused meal. Drinking water. Paz in place/care rendered voiding adequate amount. Family at bedside. 1900 Neuro assessment remain unchanged. Patient medicated for pain. Sleeping on rounds stated H/A is getting better.  /62
coordination discussions were held with appropriate clinical/nonclinical/ nursing providers based on care coordination needs. Assessment:   Given the patient's current clinical presentation, there is a high level of concern for decompensation if discharged from the emergency department. Complex decision making was performed, which includes reviewing the patient's available past medical records, laboratory results, and imaging studies. Principal Problem:    SAH (subarachnoid hemorrhage) (HCC)  Active Problems:    Acute intractable headache  Resolved Problems:    * No resolved hospital problems. *      Plan:   Subarachnoid Hemorrhage due basilar anuersym  S/P angiogram and coiling on 10/13/23  Left arm drift  - Transfer to NSTU  - Neurochecks per NIS  - Continue nimodipine x 21 days. Day 7 today (10/20/23)  - PT/OT following  - DCA today demonstrated stable coiling and no vasospasm (10/20)  - Repeat CT head (10/20): no acute process  - SBP < 160    Polyurua c/f DI  - Continue fludrocortisone   - Follow daily lytes, replete PRN    Hypothyroidism  - Continue home levothyroxine    DIET: ADULT DIET; Regular; No Added Salt (3-4 gm)  DIET ONE TIME MESSAGE;   ISOLATION PRECAUTIONS: No active isolations  CODE STATUS: [unfilled]   DVT PROPHYLAXIS: SCDs  FUNCTIONAL STATUS PRIOR TO HOSPITALIZATION: Fully ambulatory   EARLY MOBILITY ASSESSMENT: PT/OT following  ANTICIPATED DISCHARGE: 48 hours  ANTICIPATED DISPOSITION: TBD pending progress with PT/OT  EMERGENCY CONTACT/SURROGATE DECISION MAKER: VargasJulius soliz (Spouse)   [phone] (Mobile)    CRITICAL CARE WAS PERFORMED FOR THIS ENCOUNTER: NO      Signed By: Xiomara Tomas PA-C     October 20, 2023         Please note that this dictation may have been completed with Tanna Blevins, the [employer] voice recognition software. Quite often unanticipated grammatical, syntax, homophones, and other interpretive errors are inadvertently transcribed by the computer software.   Please

## 2023-10-20 NOTE — BRIEF OP NOTE
Brief Procedure Note      Patient: Jorge Luis Israel MRN: [id number]     YOB: 1965  Age: 59 y.o.   Sex: female      Service: Neurointerventional Surgery    Date of Procedure: 10/20/2023    Pre-Procedure Diagnosis: SAH    Post-Procedure Diagnosis: SAME    : Dariel Biggs DO    Assistant(s): N/A    Procedure(s):   Diagnostic cerebral angiogram      Vessels Studied:   Right CCA  Right ICA  Left CCA  Left ICA  Right vertebral artery    Puncture Site: Right radial artery--> TR band    Preliminary Findings:   No vasospasm  Coiled basial aneurysm stable    Plan:   - SBP<160  - see progress note    Specimens: None    Implants: None    Drains: None    Anesthesia: Moderate Sedation    Estimated Blood Loss: 5 cc      Apparent Intraoperative Complications: None immediate    Patient Condition: Stable    Disposition: ICU      Signed:   Dariel Biggs DO  [facility] [address] [facility]

## 2023-10-21 PROBLEM — Z98.890 STATUS POST COIL EMBOLIZATION OF CEREBRAL ANEURYSM: Status: ACTIVE | Noted: 2023-10-21

## 2023-10-21 LAB
ANION GAP SERPL CALC-SCNC: 6 MMOL/L (ref 5–15)
BUN SERPL-MCNC: 7 MG/DL (ref 6–20)
BUN/CREAT SERPL: 12 (ref 12–20)
CALCIUM SERPL-MCNC: 8.9 MG/DL (ref 8.5–10.1)
CHLORIDE SERPL-SCNC: 108 MMOL/L (ref 97–108)
CO2 SERPL-SCNC: 26 MMOL/L (ref 21–32)
CREAT SERPL-MCNC: 0.59 MG/DL (ref 0.55–1.02)
ERYTHROCYTE [DISTWIDTH] IN BLOOD BY AUTOMATED COUNT: 13.5 % (ref 11.5–14.5)
GLUCOSE BLD STRIP.AUTO-MCNC: 105 MG/DL (ref 65–117)
GLUCOSE BLD STRIP.AUTO-MCNC: 124 MG/DL (ref 65–117)
GLUCOSE SERPL-MCNC: 96 MG/DL (ref 65–100)
HCT VFR BLD AUTO: 37.5 % (ref 35–47)
HGB BLD-MCNC: 12.4 G/DL (ref 11.5–16)
MAGNESIUM SERPL-MCNC: 1.8 MG/DL (ref 1.6–2.4)
MCH RBC QN AUTO: 32.6 PG (ref 26–34)
MCHC RBC AUTO-ENTMCNC: 33.1 G/DL (ref 30–36.5)
MCV RBC AUTO: 98.7 FL (ref 80–99)
NRBC # BLD: 0 K/UL (ref 0–0.01)
NRBC BLD-RTO: 0 PER 100 WBC
PHOSPHATE SERPL-MCNC: 2.8 MG/DL (ref 2.6–4.7)
PLATELET # BLD AUTO: 154 K/UL (ref 150–400)
PMV BLD AUTO: 9.5 FL (ref 8.9–12.9)
POTASSIUM SERPL-SCNC: 4.1 MMOL/L (ref 3.5–5.1)
RBC # BLD AUTO: 3.8 M/UL (ref 3.8–5.2)
SERVICE CMNT-IMP: ABNORMAL
SERVICE CMNT-IMP: NORMAL
SODIUM SERPL-SCNC: 140 MMOL/L (ref 136–145)
WBC # BLD AUTO: 9.9 K/UL (ref 3.6–11)

## 2023-10-21 PROCEDURE — 36415 COLL VENOUS BLD VENIPUNCTURE: CPT

## 2023-10-21 PROCEDURE — 2580000003 HC RX 258: Performed by: INTERNAL MEDICINE

## 2023-10-21 PROCEDURE — 6370000000 HC RX 637 (ALT 250 FOR IP): Performed by: NURSE PRACTITIONER

## 2023-10-21 PROCEDURE — 6370000000 HC RX 637 (ALT 250 FOR IP): Performed by: STUDENT IN AN ORGANIZED HEALTH CARE EDUCATION/TRAINING PROGRAM

## 2023-10-21 PROCEDURE — 85027 COMPLETE CBC AUTOMATED: CPT

## 2023-10-21 PROCEDURE — 99232 SBSQ HOSP IP/OBS MODERATE 35: CPT

## 2023-10-21 PROCEDURE — 6370000000 HC RX 637 (ALT 250 FOR IP)

## 2023-10-21 PROCEDURE — 2060000000 HC ICU INTERMEDIATE R&B

## 2023-10-21 PROCEDURE — 82962 GLUCOSE BLOOD TEST: CPT

## 2023-10-21 PROCEDURE — 6370000000 HC RX 637 (ALT 250 FOR IP): Performed by: INTERNAL MEDICINE

## 2023-10-21 PROCEDURE — 84100 ASSAY OF PHOSPHORUS: CPT

## 2023-10-21 PROCEDURE — 83735 ASSAY OF MAGNESIUM: CPT

## 2023-10-21 PROCEDURE — 80048 BASIC METABOLIC PNL TOTAL CA: CPT

## 2023-10-21 RX ORDER — LANOLIN ALCOHOL/MO/W.PET/CERES
3 CREAM (GRAM) TOPICAL NIGHTLY PRN
Status: DISCONTINUED | OUTPATIENT
Start: 2023-10-21 | End: 2023-10-23 | Stop reason: HOSPADM

## 2023-10-21 RX ORDER — FLUDROCORTISONE ACETATE 0.1 MG/1
0.1 TABLET ORAL DAILY
Status: DISCONTINUED | OUTPATIENT
Start: 2023-10-22 | End: 2023-10-23

## 2023-10-21 RX ORDER — NICOTINE 21 MG/24HR
1 PATCH, TRANSDERMAL 24 HOURS TRANSDERMAL DAILY
Status: DISCONTINUED | OUTPATIENT
Start: 2023-10-21 | End: 2023-10-23 | Stop reason: HOSPADM

## 2023-10-21 RX ADMIN — FLUDROCORTISONE ACETATE 0.1 MG: 0.1 TABLET ORAL at 09:28

## 2023-10-21 RX ADMIN — BUPROPION HYDROCHLORIDE 150 MG: 150 TABLET, EXTENDED RELEASE ORAL at 09:28

## 2023-10-21 RX ADMIN — LEVOTHYROXINE SODIUM 125 MCG: 0.12 TABLET ORAL at 07:09

## 2023-10-21 RX ADMIN — GABAPENTIN 200 MG: 100 CAPSULE ORAL at 21:01

## 2023-10-21 RX ADMIN — OXYCODONE HYDROCHLORIDE 5 MG: 5 TABLET ORAL at 23:13

## 2023-10-21 RX ADMIN — SODIUM CHLORIDE, PRESERVATIVE FREE 10 ML: 5 INJECTION INTRAVENOUS at 09:29

## 2023-10-21 RX ADMIN — BUTALBITAL, ACETAMINOPHEN, AND CAFFEINE 1 TABLET: 50; 325; 40 TABLET ORAL at 12:01

## 2023-10-21 RX ADMIN — POLYETHYLENE GLYCOL 3350 17 G: 17 POWDER, FOR SOLUTION ORAL at 09:29

## 2023-10-21 RX ADMIN — BUPROPION HYDROCHLORIDE 150 MG: 150 TABLET, EXTENDED RELEASE ORAL at 21:01

## 2023-10-21 RX ADMIN — NIMODIPINE 60 MG: 30 CAPSULE, LIQUID FILLED ORAL at 21:01

## 2023-10-21 RX ADMIN — ASPIRIN 81 MG CHEWABLE TABLET 81 MG: 81 TABLET CHEWABLE at 09:28

## 2023-10-21 RX ADMIN — NIMODIPINE 60 MG: 30 CAPSULE, LIQUID FILLED ORAL at 02:31

## 2023-10-21 RX ADMIN — BUTALBITAL, ACETAMINOPHEN, AND CAFFEINE 1 TABLET: 50; 325; 40 TABLET ORAL at 00:00

## 2023-10-21 RX ADMIN — NIMODIPINE 60 MG: 30 CAPSULE, LIQUID FILLED ORAL at 23:13

## 2023-10-21 RX ADMIN — NIMODIPINE 60 MG: 30 CAPSULE, LIQUID FILLED ORAL at 07:07

## 2023-10-21 RX ADMIN — CETIRIZINE HYDROCHLORIDE 10 MG: 10 TABLET, FILM COATED ORAL at 09:28

## 2023-10-21 RX ADMIN — Medication 3 MG: at 23:13

## 2023-10-21 RX ADMIN — NIMODIPINE 60 MG: 30 CAPSULE, LIQUID FILLED ORAL at 07:08

## 2023-10-21 RX ADMIN — NIMODIPINE 60 MG: 30 CAPSULE, LIQUID FILLED ORAL at 15:55

## 2023-10-21 RX ADMIN — BUTALBITAL, ACETAMINOPHEN, AND CAFFEINE 1 TABLET: 50; 325; 40 TABLET ORAL at 21:01

## 2023-10-21 RX ADMIN — SODIUM CHLORIDE, PRESERVATIVE FREE 10 ML: 5 INJECTION INTRAVENOUS at 01:27

## 2023-10-21 RX ADMIN — BUTALBITAL, ACETAMINOPHEN, AND CAFFEINE 1 TABLET: 50; 325; 40 TABLET ORAL at 04:51

## 2023-10-21 RX ADMIN — LIOTHYRONINE SODIUM 5 MCG: 5 TABLET ORAL at 09:28

## 2023-10-21 RX ADMIN — NIMODIPINE 60 MG: 30 CAPSULE, LIQUID FILLED ORAL at 12:00

## 2023-10-21 ASSESSMENT — PAIN DESCRIPTION - LOCATION: LOCATION: HEAD

## 2023-10-21 ASSESSMENT — PAIN SCALES - GENERAL: PAINLEVEL_OUTOF10: 6

## 2023-10-21 ASSESSMENT — PAIN DESCRIPTION - DESCRIPTORS: DESCRIPTORS: ACHING;HEAVINESS;JABBING

## 2023-10-22 PROBLEM — I60.8 SUBARACHNOID HEMORRHAGE DUE TO RUPTURED ANEURYSM (HCC): Status: ACTIVE | Noted: 2023-10-22

## 2023-10-22 LAB
ANION GAP SERPL CALC-SCNC: 6 MMOL/L (ref 5–15)
BUN SERPL-MCNC: 7 MG/DL (ref 6–20)
BUN/CREAT SERPL: 9 (ref 12–20)
CALCIUM SERPL-MCNC: 8.4 MG/DL (ref 8.5–10.1)
CHLORIDE SERPL-SCNC: 106 MMOL/L (ref 97–108)
CO2 SERPL-SCNC: 28 MMOL/L (ref 21–32)
CREAT SERPL-MCNC: 0.74 MG/DL (ref 0.55–1.02)
ERYTHROCYTE [DISTWIDTH] IN BLOOD BY AUTOMATED COUNT: 13.5 % (ref 11.5–14.5)
GLUCOSE SERPL-MCNC: 118 MG/DL (ref 65–100)
HCT VFR BLD AUTO: 35.6 % (ref 35–47)
HGB BLD-MCNC: 11.9 G/DL (ref 11.5–16)
MAGNESIUM SERPL-MCNC: 1.8 MG/DL (ref 1.6–2.4)
MCH RBC QN AUTO: 33.1 PG (ref 26–34)
MCHC RBC AUTO-ENTMCNC: 33.4 G/DL (ref 30–36.5)
MCV RBC AUTO: 98.9 FL (ref 80–99)
NRBC # BLD: 0 K/UL (ref 0–0.01)
NRBC BLD-RTO: 0 PER 100 WBC
PHOSPHATE SERPL-MCNC: 2.8 MG/DL (ref 2.6–4.7)
PLATELET # BLD AUTO: 203 K/UL (ref 150–400)
PMV BLD AUTO: 8.6 FL (ref 8.9–12.9)
POTASSIUM SERPL-SCNC: 3.5 MMOL/L (ref 3.5–5.1)
RBC # BLD AUTO: 3.6 M/UL (ref 3.8–5.2)
SODIUM SERPL-SCNC: 140 MMOL/L (ref 136–145)
WBC # BLD AUTO: 11.1 K/UL (ref 3.6–11)

## 2023-10-22 PROCEDURE — 6370000000 HC RX 637 (ALT 250 FOR IP): Performed by: NURSE PRACTITIONER

## 2023-10-22 PROCEDURE — 80048 BASIC METABOLIC PNL TOTAL CA: CPT

## 2023-10-22 PROCEDURE — 6370000000 HC RX 637 (ALT 250 FOR IP)

## 2023-10-22 PROCEDURE — 84100 ASSAY OF PHOSPHORUS: CPT

## 2023-10-22 PROCEDURE — 2580000003 HC RX 258: Performed by: INTERNAL MEDICINE

## 2023-10-22 PROCEDURE — 83735 ASSAY OF MAGNESIUM: CPT

## 2023-10-22 PROCEDURE — 85027 COMPLETE CBC AUTOMATED: CPT

## 2023-10-22 PROCEDURE — 6370000000 HC RX 637 (ALT 250 FOR IP): Performed by: INTERNAL MEDICINE

## 2023-10-22 PROCEDURE — 99232 SBSQ HOSP IP/OBS MODERATE 35: CPT | Performed by: NURSE PRACTITIONER

## 2023-10-22 PROCEDURE — 6370000000 HC RX 637 (ALT 250 FOR IP): Performed by: STUDENT IN AN ORGANIZED HEALTH CARE EDUCATION/TRAINING PROGRAM

## 2023-10-22 PROCEDURE — 2060000000 HC ICU INTERMEDIATE R&B

## 2023-10-22 PROCEDURE — 36415 COLL VENOUS BLD VENIPUNCTURE: CPT

## 2023-10-22 RX ADMIN — BUTALBITAL, ACETAMINOPHEN, AND CAFFEINE 1 TABLET: 50; 325; 40 TABLET ORAL at 20:29

## 2023-10-22 RX ADMIN — LEVOTHYROXINE SODIUM 125 MCG: 0.12 TABLET ORAL at 06:54

## 2023-10-22 RX ADMIN — BUPROPION HYDROCHLORIDE 150 MG: 150 TABLET, EXTENDED RELEASE ORAL at 08:23

## 2023-10-22 RX ADMIN — SODIUM CHLORIDE, PRESERVATIVE FREE 10 ML: 5 INJECTION INTRAVENOUS at 08:29

## 2023-10-22 RX ADMIN — NIMODIPINE 60 MG: 30 CAPSULE, LIQUID FILLED ORAL at 04:16

## 2023-10-22 RX ADMIN — NIMODIPINE 60 MG: 30 CAPSULE, LIQUID FILLED ORAL at 12:39

## 2023-10-22 RX ADMIN — NIMODIPINE 60 MG: 30 CAPSULE, LIQUID FILLED ORAL at 20:28

## 2023-10-22 RX ADMIN — BUPROPION HYDROCHLORIDE 150 MG: 150 TABLET, EXTENDED RELEASE ORAL at 20:29

## 2023-10-22 RX ADMIN — ONDANSETRON 4 MG: 4 TABLET, ORALLY DISINTEGRATING ORAL at 03:36

## 2023-10-22 RX ADMIN — FLUDROCORTISONE ACETATE 0.1 MG: 0.1 TABLET ORAL at 08:24

## 2023-10-22 RX ADMIN — GABAPENTIN 200 MG: 100 CAPSULE ORAL at 20:30

## 2023-10-22 RX ADMIN — LIOTHYRONINE SODIUM 5 MCG: 5 TABLET ORAL at 08:24

## 2023-10-22 RX ADMIN — BUTALBITAL, ACETAMINOPHEN, AND CAFFEINE 1 TABLET: 50; 325; 40 TABLET ORAL at 16:34

## 2023-10-22 RX ADMIN — NIMODIPINE 60 MG: 30 CAPSULE, LIQUID FILLED ORAL at 08:23

## 2023-10-22 RX ADMIN — BUTALBITAL, ACETAMINOPHEN, AND CAFFEINE 1 TABLET: 50; 325; 40 TABLET ORAL at 04:16

## 2023-10-22 RX ADMIN — POLYETHYLENE GLYCOL 3350 17 G: 17 POWDER, FOR SOLUTION ORAL at 08:24

## 2023-10-22 RX ADMIN — CETIRIZINE HYDROCHLORIDE 10 MG: 10 TABLET, FILM COATED ORAL at 08:23

## 2023-10-22 RX ADMIN — SODIUM CHLORIDE, PRESERVATIVE FREE 10 ML: 5 INJECTION INTRAVENOUS at 20:31

## 2023-10-22 RX ADMIN — BUTALBITAL, ACETAMINOPHEN, AND CAFFEINE 1 TABLET: 50; 325; 40 TABLET ORAL at 12:39

## 2023-10-22 RX ADMIN — BUTALBITAL, ACETAMINOPHEN, AND CAFFEINE 1 TABLET: 50; 325; 40 TABLET ORAL at 08:24

## 2023-10-22 RX ADMIN — ASPIRIN 81 MG CHEWABLE TABLET 81 MG: 81 TABLET CHEWABLE at 08:24

## 2023-10-22 RX ADMIN — NIMODIPINE 60 MG: 30 CAPSULE, LIQUID FILLED ORAL at 16:34

## 2023-10-22 ASSESSMENT — PAIN DESCRIPTION - DESCRIPTORS: DESCRIPTORS: ACHING;BURNING;DISCOMFORT;PRESSURE

## 2023-10-22 ASSESSMENT — PAIN SCALES - GENERAL
PAINLEVEL_OUTOF10: 6
PAINLEVEL_OUTOF10: 7
PAINLEVEL_OUTOF10: 9
PAINLEVEL_OUTOF10: 0

## 2023-10-22 ASSESSMENT — PAIN DESCRIPTION - LOCATION
LOCATION: HEAD
LOCATION: HEAD

## 2023-10-23 VITALS
SYSTOLIC BLOOD PRESSURE: 127 MMHG | OXYGEN SATURATION: 98 % | HEART RATE: 86 BPM | TEMPERATURE: 97.5 F | DIASTOLIC BLOOD PRESSURE: 61 MMHG | WEIGHT: 151.01 LBS | RESPIRATION RATE: 14 BRPM | HEIGHT: 65 IN | BODY MASS INDEX: 25.16 KG/M2

## 2023-10-23 LAB
ANION GAP SERPL CALC-SCNC: 5 MMOL/L (ref 5–15)
BUN SERPL-MCNC: 11 MG/DL (ref 6–20)
BUN/CREAT SERPL: 15 (ref 12–20)
CALCIUM SERPL-MCNC: 8.7 MG/DL (ref 8.5–10.1)
CHLORIDE SERPL-SCNC: 107 MMOL/L (ref 97–108)
CO2 SERPL-SCNC: 29 MMOL/L (ref 21–32)
CREAT SERPL-MCNC: 0.75 MG/DL (ref 0.55–1.02)
ERYTHROCYTE [DISTWIDTH] IN BLOOD BY AUTOMATED COUNT: 13.8 % (ref 11.5–14.5)
GLUCOSE SERPL-MCNC: 93 MG/DL (ref 65–100)
HCT VFR BLD AUTO: 36 % (ref 35–47)
HGB BLD-MCNC: 12 G/DL (ref 11.5–16)
MAGNESIUM SERPL-MCNC: 2.2 MG/DL (ref 1.6–2.4)
MCH RBC QN AUTO: 32.5 PG (ref 26–34)
MCHC RBC AUTO-ENTMCNC: 33.3 G/DL (ref 30–36.5)
MCV RBC AUTO: 97.6 FL (ref 80–99)
NRBC # BLD: 0 K/UL (ref 0–0.01)
NRBC BLD-RTO: 0 PER 100 WBC
PHOSPHATE SERPL-MCNC: 2.7 MG/DL (ref 2.6–4.7)
PLATELET # BLD AUTO: 249 K/UL (ref 150–400)
PMV BLD AUTO: 8.5 FL (ref 8.9–12.9)
POTASSIUM SERPL-SCNC: 3.9 MMOL/L (ref 3.5–5.1)
RBC # BLD AUTO: 3.69 M/UL (ref 3.8–5.2)
SODIUM SERPL-SCNC: 141 MMOL/L (ref 136–145)
WBC # BLD AUTO: 10.3 K/UL (ref 3.6–11)

## 2023-10-23 PROCEDURE — 84100 ASSAY OF PHOSPHORUS: CPT

## 2023-10-23 PROCEDURE — 83735 ASSAY OF MAGNESIUM: CPT

## 2023-10-23 PROCEDURE — 85027 COMPLETE CBC AUTOMATED: CPT

## 2023-10-23 PROCEDURE — 6370000000 HC RX 637 (ALT 250 FOR IP): Performed by: NURSE PRACTITIONER

## 2023-10-23 PROCEDURE — 36415 COLL VENOUS BLD VENIPUNCTURE: CPT

## 2023-10-23 PROCEDURE — 97535 SELF CARE MNGMENT TRAINING: CPT

## 2023-10-23 PROCEDURE — 99232 SBSQ HOSP IP/OBS MODERATE 35: CPT | Performed by: NURSE PRACTITIONER

## 2023-10-23 PROCEDURE — 2580000003 HC RX 258: Performed by: NURSE PRACTITIONER

## 2023-10-23 PROCEDURE — 80048 BASIC METABOLIC PNL TOTAL CA: CPT

## 2023-10-23 PROCEDURE — 97530 THERAPEUTIC ACTIVITIES: CPT

## 2023-10-23 PROCEDURE — 2580000003 HC RX 258: Performed by: INTERNAL MEDICINE

## 2023-10-23 PROCEDURE — 6370000000 HC RX 637 (ALT 250 FOR IP): Performed by: STUDENT IN AN ORGANIZED HEALTH CARE EDUCATION/TRAINING PROGRAM

## 2023-10-23 PROCEDURE — 6370000000 HC RX 637 (ALT 250 FOR IP): Performed by: INTERNAL MEDICINE

## 2023-10-23 PROCEDURE — 6370000000 HC RX 637 (ALT 250 FOR IP)

## 2023-10-23 RX ORDER — NIMODIPINE 30 MG/1
60 CAPSULE, LIQUID FILLED ORAL EVERY 4 HOURS
Qty: 66 CAPSULE | Refills: 0 | Status: SHIPPED | OUTPATIENT
Start: 2023-10-23 | End: 2023-11-03

## 2023-10-23 RX ORDER — NIMODIPINE 30 MG/1
60 CAPSULE, LIQUID FILLED ORAL EVERY 4 HOURS
Qty: 66 CAPSULE | Refills: 0 | Status: SHIPPED | OUTPATIENT
Start: 2023-10-23 | End: 2023-10-23 | Stop reason: SDUPTHER

## 2023-10-23 RX ORDER — NIMODIPINE 30 MG/1
60 CAPSULE, LIQUID FILLED ORAL EVERY 4 HOURS
Qty: 60 CAPSULE | Refills: 1 | Status: SHIPPED | OUTPATIENT
Start: 2023-10-23 | End: 2023-10-23 | Stop reason: SDUPTHER

## 2023-10-23 RX ORDER — SODIUM CHLORIDE, SODIUM LACTATE, POTASSIUM CHLORIDE, CALCIUM CHLORIDE 600; 310; 30; 20 MG/100ML; MG/100ML; MG/100ML; MG/100ML
INJECTION, SOLUTION INTRAVENOUS CONTINUOUS
Status: DISCONTINUED | OUTPATIENT
Start: 2023-10-23 | End: 2023-10-23 | Stop reason: HOSPADM

## 2023-10-23 RX ORDER — ASPIRIN 81 MG/1
81 TABLET, CHEWABLE ORAL DAILY
Qty: 30 TABLET | Refills: 3 | Status: SHIPPED | OUTPATIENT
Start: 2023-10-24

## 2023-10-23 RX ORDER — NICOTINE 21 MG/24HR
1 PATCH, TRANSDERMAL 24 HOURS TRANSDERMAL DAILY
Qty: 30 PATCH | Refills: 3 | Status: SHIPPED | OUTPATIENT
Start: 2023-10-24

## 2023-10-23 RX ADMIN — FLUDROCORTISONE ACETATE 0.1 MG: 0.1 TABLET ORAL at 09:22

## 2023-10-23 RX ADMIN — BUTALBITAL, ACETAMINOPHEN, AND CAFFEINE 1 TABLET: 50; 325; 40 TABLET ORAL at 04:56

## 2023-10-23 RX ADMIN — BUPROPION HYDROCHLORIDE 150 MG: 150 TABLET, EXTENDED RELEASE ORAL at 09:22

## 2023-10-23 RX ADMIN — SODIUM CHLORIDE, POTASSIUM CHLORIDE, SODIUM LACTATE AND CALCIUM CHLORIDE: 600; 310; 30; 20 INJECTION, SOLUTION INTRAVENOUS at 09:40

## 2023-10-23 RX ADMIN — POLYETHYLENE GLYCOL 3350 17 G: 17 POWDER, FOR SOLUTION ORAL at 09:23

## 2023-10-23 RX ADMIN — NIMODIPINE 60 MG: 30 CAPSULE, LIQUID FILLED ORAL at 12:28

## 2023-10-23 RX ADMIN — SODIUM CHLORIDE, PRESERVATIVE FREE 10 ML: 5 INJECTION INTRAVENOUS at 09:45

## 2023-10-23 RX ADMIN — NIMODIPINE 60 MG: 30 CAPSULE, LIQUID FILLED ORAL at 04:56

## 2023-10-23 RX ADMIN — NIMODIPINE 60 MG: 30 CAPSULE, LIQUID FILLED ORAL at 00:55

## 2023-10-23 RX ADMIN — LEVOTHYROXINE SODIUM 125 MCG: 0.12 TABLET ORAL at 08:05

## 2023-10-23 RX ADMIN — CETIRIZINE HYDROCHLORIDE 10 MG: 10 TABLET, FILM COATED ORAL at 09:22

## 2023-10-23 RX ADMIN — LIOTHYRONINE SODIUM 5 MCG: 5 TABLET ORAL at 09:22

## 2023-10-23 RX ADMIN — NIMODIPINE 60 MG: 30 CAPSULE, LIQUID FILLED ORAL at 16:07

## 2023-10-23 RX ADMIN — ACETAMINOPHEN 650 MG: 325 TABLET ORAL at 09:39

## 2023-10-23 RX ADMIN — ACETAMINOPHEN 650 MG: 325 TABLET ORAL at 12:28

## 2023-10-23 RX ADMIN — NIMODIPINE 60 MG: 30 CAPSULE, LIQUID FILLED ORAL at 09:22

## 2023-10-23 RX ADMIN — ASPIRIN 81 MG CHEWABLE TABLET 81 MG: 81 TABLET CHEWABLE at 09:22

## 2023-10-23 RX ADMIN — BUTALBITAL, ACETAMINOPHEN, AND CAFFEINE 1 TABLET: 50; 325; 40 TABLET ORAL at 00:55

## 2023-10-23 ASSESSMENT — PAIN DESCRIPTION - DESCRIPTORS: DESCRIPTORS: ACHING

## 2023-10-23 ASSESSMENT — PAIN DESCRIPTION - LOCATION
LOCATION: HEAD

## 2023-10-23 ASSESSMENT — PAIN SCALES - GENERAL
PAINLEVEL_OUTOF10: 5
PAINLEVEL_OUTOF10: 0
PAINLEVEL_OUTOF10: 5

## 2023-10-23 NOTE — DISCHARGE INSTRUCTIONS
Discharge Instructions       PATIENT ID: lFaco Fajardo  MRN: [id number]   YOB: 1965    DATE OF ADMISSION: [unfilled]    DATE OF DISCHARGE: 10/23/2023    PRIMARY CARE PROVIDER: @PCP@     ATTENDING PHYSICIAN: [unfilled]  DISCHARGING PROVIDER: Roxianne Cranker, MD    To contact this individual call [phone] and ask the  to page. If unavailable ask to be transferred the Adult Hospitalist Department. DISCHARGE DIAGNOSES SAH    CONSULTATIONS: NIS    PROCEDURES/SURGERIES: * No surgery found *    PENDING TEST RESULTS:   At the time of discharge the following test results are still pending: none    FOLLOW UP APPOINTMENTS:   PCP  NIS    ADDITIONAL CARE RECOMMENDATIONS: as above    DIET: cardiac diet    ACTIVITY: activity as tolerated      DISCHARGE MEDICATIONS:   See Medication Reconciliation Form    It is important that you take the medication exactly as they are prescribed. Keep your medication in the bottles provided by the pharmacist and keep a list of the medication names, dosages, and times to be taken in your wallet. Do not take other medications without consulting your doctor. NOTIFY YOUR PHYSICIAN FOR ANY OF THE FOLLOWING:   Fever over 101 degrees for 24 hours. Chest pain, shortness of breath, fever, chills, nausea, vomiting, diarrhea, change in mentation, falling, weakness, bleeding. Severe pain or pain not relieved by medications. Or, any other signs or symptoms that you may have questions about.       DISPOSITION:   x Home With:   OT  PT  HH  RN       SNF/Inpatient Rehab/LTAC    Independent/assisted living    Hospice    Other:     CDMP Checked:   Yes x     PROBLEM LIST Updated:  Yes x       Signed:   Roxianne Cranker, MD  10/23/2023  12:39 PM

## 2023-10-24 ENCOUNTER — TELEPHONE (OUTPATIENT)
Age: 58
End: 2023-10-24

## 2023-10-24 NOTE — TELEPHONE ENCOUNTER
Pt daughter called stating pt is experiencing heart burn and nausea, is concerned this is a side effect of the nimotop. Told her pt may take Pepcid for heartburn. If pt starts vomiting and cannot keep down fluids she was instructed pt should go to the ED for hydration. Educated her on BEFAST, to call 911 immediately for symptoms. She verbalized understanding.

## 2023-10-27 ENCOUNTER — TELEPHONE (OUTPATIENT)
Age: 58
End: 2023-10-27

## 2023-10-27 NOTE — CARE COORDINATION
CM received call from attending asking for [facility] to be set up as this was not done prior to discharge. TIFFANIE sent multiple referrals via [address], RN/CRM  [phone] Drops ordered x1per protocol.  Last visit was 6-10-21.  Patient was advised to follow up in 5 months for a COM exam.  No pending appointment.    Routed to reception to schedule a COM exam with Dr. Castro.

## 2023-10-27 NOTE — TELEPHONE ENCOUNTER
----- Message from Hollie Phoenix sent at 10/27/2023 11:15 AM EDT -----  Subject: Hospital Follow Up    QUESTIONS  What hospital was the Patient Discharged from? [facility]  Date of Discharge? 2023-10-23  Discharge Location? Home  Reason for hospitalization as patient stated? 10/13/23 ruptured aneurysm  What question does the patient have, if applicable? She will address them   during her visit.  ---------------------------------------------------------------------------  --------------  Frances Marine Lewis  What is the best way for the office to contact you? OK to leave message on   voicemail  Preferred Call Back Phone Number? [phone]  ---------------------------------------------------------------------------  --------------  SCRIPT ANSWERS  Relationship to Patient?  Self

## 2023-10-27 NOTE — CARE COORDINATION
CM spoke with Jessica Beckett, phone [phone] and she is still awaiting insurance verification to determine pt's coverage and if [facility] is in network. Unsure if answer will be received today due to lateness of hour. Agency only has PT in the [address] [facility] area. CM called Ms. Mina Bean, phone [phone] and explained above. She verbalized understanding that it may not get determination today.       Kelvin Peña, [address] [place] [address]   [phone]

## 2023-11-06 ENCOUNTER — TELEPHONE (OUTPATIENT)
Age: 58
End: 2023-11-06

## 2023-11-06 NOTE — TELEPHONE ENCOUNTER
Patient called wanting guidance. She is set to fly on 12/2/2023 and will be going on a cruise. She is wanting to know if it's ok to do the above. Please Advise.

## 2023-11-07 RX ORDER — LIOTHYRONINE SODIUM 5 UG/1
TABLET ORAL
Qty: 90 TABLET | Refills: 3 | Status: SHIPPED | OUTPATIENT
Start: 2023-11-07

## 2023-11-20 RX ORDER — LEVOTHYROXINE SODIUM 125 UG/1
TABLET ORAL
Qty: 90 TABLET | Refills: 3 | Status: SHIPPED | OUTPATIENT
Start: 2023-11-20

## 2023-11-22 ENCOUNTER — OFFICE VISIT (OUTPATIENT)
Age: 58
End: 2023-11-22
Payer: COMMERCIAL

## 2023-11-22 VITALS
HEART RATE: 77 BPM | TEMPERATURE: 97.9 F | SYSTOLIC BLOOD PRESSURE: 112 MMHG | WEIGHT: 149 LBS | DIASTOLIC BLOOD PRESSURE: 72 MMHG | BODY MASS INDEX: 24.83 KG/M2 | OXYGEN SATURATION: 99 % | HEIGHT: 65 IN

## 2023-11-22 DIAGNOSIS — I60.9 SAH (SUBARACHNOID HEMORRHAGE) (HCC): Primary | ICD-10-CM

## 2023-11-22 PROCEDURE — 99213 OFFICE O/P EST LOW 20 MIN: CPT | Performed by: RADIOLOGY

## 2023-11-22 RX ORDER — ACETAMINOPHEN 160 MG
2000 TABLET,DISINTEGRATING ORAL DAILY
COMMUNITY

## 2023-11-22 RX ORDER — ZINC GLUCONATE 50 MG
50 TABLET ORAL DAILY
COMMUNITY

## 2023-11-22 RX ORDER — PHENOL 1.4 %
10 AEROSOL, SPRAY (ML) MUCOUS MEMBRANE NIGHTLY
COMMUNITY

## 2023-11-22 NOTE — PATIENT INSTRUCTIONS
Follow up Diagnostic Angiogram in April 2024 at [address] time and date will be called to you in March 2024. Stop taking Aspirin daily. Referral to Neurology.

## 2023-11-27 ENCOUNTER — TELEPHONE (OUTPATIENT)
Age: 58
End: 2023-11-27

## 2023-11-27 NOTE — TELEPHONE ENCOUNTER
Spoke to patient regarding driving privileges. Patient stated she spoke to someone at [facility] who stated her driving privileges were not suspended. Patient reports she has not received anything in the mail drom DMV. Advised patient to contact her PCP for clearance to return to driving. NIS did not suspend privilege. Emergency room would have notified DMV initially. Patient stated understanding.

## 2023-11-29 ENCOUNTER — OFFICE VISIT (OUTPATIENT)
Age: 58
End: 2023-11-29
Payer: COMMERCIAL

## 2023-11-29 VITALS
WEIGHT: 152 LBS | TEMPERATURE: 97.6 F | BODY MASS INDEX: 25.33 KG/M2 | HEIGHT: 65 IN | DIASTOLIC BLOOD PRESSURE: 67 MMHG | RESPIRATION RATE: 16 BRPM | SYSTOLIC BLOOD PRESSURE: 103 MMHG | OXYGEN SATURATION: 97 % | HEART RATE: 84 BPM

## 2023-11-29 DIAGNOSIS — Z98.890 STATUS POST COIL EMBOLIZATION OF CEREBRAL ANEURYSM: ICD-10-CM

## 2023-11-29 DIAGNOSIS — I60.8 SUBARACHNOID HEMORRHAGE DUE TO RUPTURED ANEURYSM (HCC): Primary | ICD-10-CM

## 2023-11-29 PROCEDURE — 99214 OFFICE O/P EST MOD 30 MIN: CPT | Performed by: FAMILY MEDICINE

## 2023-11-29 RX ORDER — MOMETASONE FUROATE 1 MG/G
CREAM TOPICAL DAILY
Qty: 15 G | Refills: 5 | Status: SHIPPED | OUTPATIENT
Start: 2023-11-29

## 2023-11-29 ASSESSMENT — PATIENT HEALTH QUESTIONNAIRE - PHQ9
SUM OF ALL RESPONSES TO PHQ QUESTIONS 1-9: 0
SUM OF ALL RESPONSES TO PHQ QUESTIONS 1-9: 0
1. LITTLE INTEREST OR PLEASURE IN DOING THINGS: 0
2. FEELING DOWN, DEPRESSED OR HOPELESS: 0
SUM OF ALL RESPONSES TO PHQ9 QUESTIONS 1 & 2: 0
SUM OF ALL RESPONSES TO PHQ QUESTIONS 1-9: 0
SUM OF ALL RESPONSES TO PHQ QUESTIONS 1-9: 0

## 2023-12-01 ENCOUNTER — TELEPHONE (OUTPATIENT)
Age: 58
End: 2023-12-01

## 2023-12-01 DIAGNOSIS — I60.9 SAH (SUBARACHNOID HEMORRHAGE) (HCC): Primary | ICD-10-CM

## 2023-12-01 DIAGNOSIS — Z98.890 STATUS POST COIL EMBOLIZATION OF CEREBRAL ANEURYSM: ICD-10-CM

## 2023-12-01 DIAGNOSIS — I60.8 SUBARACHNOID HEMORRHAGE DUE TO RUPTURED ANEURYSM (HCC): Primary | ICD-10-CM

## 2023-12-01 NOTE — TELEPHONE ENCOUNTER
Received a call from patient requesting to change referral to Neurology to [facility]. [facility] Neurology office visits range from March - October 2024. New order noted.

## 2023-12-01 NOTE — TELEPHONE ENCOUNTER
----- Message from Cali Caldwell MA sent at 11/29/2023  4:36 PM EST -----  Regarding: FW: Neurologist Appointment  Contact: [phone]    ----- Message -----  From: Naldo Soto  Sent: 11/29/2023   4:27 PM EST  To: #  Subject: Neurologist Appointment                          [facility], they have appointments for January 17,18 & 19 but need the referral. The address is [address] [place] [address], [address] [place] [address], [address] [place] [address] phone [phone] fax [phone] Please let me know if you would be willing to send it to them. Thank you.

## 2023-12-04 NOTE — PROGRESS NOTES
Neurointerventional Surgery Clinic Note        Patient: Anahi Lieberman MRN: [id number]  SSN: [id number]    YOB: 1965  Age: 59 y.o. Sex: female      Subjective:      Anahi Lieberman is a 59 y.o. female who is being seen for follow-up following discharge for aneurysmal subarachnoid hemorrhage. Patient has undergone embolization of basilar tip aneurysm on 10/13/2023.     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    s/p 16 mCi for Grave's disease    Seasonal allergic rhinitis     Stroke ([address]) 10/2023     Past Surgical History:   Procedure Laterality Date    IR MECHANICAL ART THROMBECTOMY INTRACRANIAL  10/13/2023    IR MECHANICAL ART THROMBECTOMY INTRACRANIAL 10/13/2023 [address] RAD ANGIO IR    REFRACTIVE SURGERY      SALPINGO-OOPHORECTOMY      TUBAL LIGATION        Family History   Problem Relation Age of Onset    Thyroid Disease Mother         hypothyroidism    Depression Mother     Hypertension Mother     Dementia Mother     Thyroid Disease Father         hypothyroidism    Cancer Father         bladder    Heart Disease Father     Thyroid Disease Sister         hypothyroidism    Depression Sister     Diabetes Brother     Cancer Brother         tumor on shoulder blade    Migraines Maternal Grandmother     Parkinson's Disease Maternal Grandfather     No Known Problems Maternal Uncle     Heart Disease Paternal Uncle      Social History     Tobacco Use    Smoking status: Former     Packs/day: .8     Types: Cigarettes     Quit date: 10/13/2023     Years since quittin.1    Smokeless tobacco: Never   Substance Use Topics    Alcohol use: Not Currently      Current Outpatient Medications   Medication Sig Dispense Refill    PROGESTERONE PO Take 300 mg by mouth daily      Pregnenolone 50 MG TABS Take 5 mg by mouth daily      Ascorbic Acid (VITAMIN C PO) Take 1,000 Units by mouth daily      Sennosides (EX-LAX PO) Take 25 mg by mouth daily      Cholecalciferol (VITAMIN D3) 50

## 2023-12-15 RX ORDER — BUPROPION HYDROCHLORIDE 300 MG/1
300 TABLET ORAL EVERY MORNING
Qty: 90 TABLET | Refills: 3 | Status: SHIPPED | OUTPATIENT
Start: 2023-12-15

## 2023-12-28 ENCOUNTER — TELEPHONE (OUTPATIENT)
Age: 58
End: 2023-12-28

## 2023-12-28 NOTE — TELEPHONE ENCOUNTER
Patient called to check on the status of her short term disability paperwork. Patient is also wanting to know what symptoms she would need to call us about.     Patient states that she has been feeling dizzy when she stands. She sometimes feels like she may pass out.     Patient is seeing an outside neurologist mid January. I requested that she get the notes sent to us.

## 2023-12-31 DIAGNOSIS — E89.0 POSTPROCEDURAL HYPOTHYROIDISM: ICD-10-CM

## 2024-01-02 NOTE — TELEPHONE ENCOUNTER
I called the patient to let her know what Dr. Diaz recommended.    She expressed understanding and will be contacting her PCP about the Disability Paperwork.

## 2024-01-04 ENCOUNTER — CLINICAL DOCUMENTATION (OUTPATIENT)
Age: 59
End: 2024-01-04

## 2024-01-04 LAB
T4 FREE SERPL-MCNC: 1.69 NG/DL (ref 0.82–1.77)
TSH SERPL DL<=0.005 MIU/L-ACNC: 0.27 UIU/ML (ref 0.45–4.5)

## 2024-01-04 NOTE — PROGRESS NOTES
Per PSR's provider advised patient to have PCP complete Short term disability forms.  Patient is aware.

## 2024-01-05 LAB — T3 SERPL-MCNC: 120 NG/DL (ref 71–180)

## 2024-01-12 ENCOUNTER — OFFICE VISIT (OUTPATIENT)
Age: 59
End: 2024-01-12
Payer: COMMERCIAL

## 2024-01-12 VITALS
WEIGHT: 158.8 LBS | BODY MASS INDEX: 26.46 KG/M2 | HEIGHT: 65 IN | DIASTOLIC BLOOD PRESSURE: 61 MMHG | HEART RATE: 74 BPM | SYSTOLIC BLOOD PRESSURE: 116 MMHG

## 2024-01-12 DIAGNOSIS — E89.0 POSTABLATIVE HYPOTHYROIDISM: Primary | ICD-10-CM

## 2024-01-12 DIAGNOSIS — Z72.0 TOBACCO USE: ICD-10-CM

## 2024-01-12 PROCEDURE — 99214 OFFICE O/P EST MOD 30 MIN: CPT | Performed by: INTERNAL MEDICINE

## 2024-01-12 RX ORDER — LEVOTHYROXINE SODIUM 100 UG/1
100 TABLET ORAL DAILY
Qty: 90 TABLET | Refills: 3 | Status: SHIPPED | OUTPATIENT
Start: 2024-01-12

## 2024-01-12 NOTE — PATIENT INSTRUCTIONS
1) TSH is a thyroid test.  Your level is 0.26 which is slightly low and below goal of 0.45-2.0.  The TSH goes opposite of your thyroid dose and suggests your dose of levoxyl is more than you need.  I will decrease your dose to 100 mcg and take 1 tab 7 days a week.  Go back to taking cytomel (liothyronine) 5 mcg 1 tab 7 days a week.      2) Repeat your labs in 6-8 weeks.  If these are done through the [employer], then make sure I get a copy of the labs.

## 2024-01-12 NOTE — PROGRESS NOTES
Chief Complaint   Patient presents with    Thyroid Problem     PCP and pharmacy confirmed      History of Present Illness: Liset Vargas is a 58 y.o. female here for follow up of thyroid.  Weight up 12 lbs since last visit in 1/23.  Went on a cruise with 2 of her kids and her sister-in-law for her 58th birthday in December.  Her  accidentally fell and hit the spout and burst a pipe in 4/23 and this caused water damage throughout the house.  Her 87 yo mother passed away in 9/23.  Then in 10/23 suffered a subarachnoid hemorrhage and received a coil from neurointerventional radiology and medication to help with bleeding upon discharge.  She has not had any recurrence of symptoms.  Was told by Dr. Diaz that she needs neurology clearance to drive and will be seeing Dr. Lee next week and has not driven just to be safe.  Has been taking levoxyl and cytomel 6.5 tabs/week of each and hasn't missed any doses.  She did get her last dose of hormone pellets in December and the doctor felt it was safe for her to receive this even with her recent SAH.  Did initially have some mild physical deficits after the SAH but they have since resolved and didn't need any PT.  Has felt at times like she was going to pass out since the SAH and on her recent cruise it happened about 5 times but never lost consciousness.  No chest pain or palpitations.  Quit smoking during her hospital stay and did wear nicotine patches but hasn't needed them in the past 2 weeks and remains on wellbutrin.  Bowels are about the same and tend to be on the slower side.  No hair loss or brittle nails.  Has trouble sleeping and thinks this is worse over the past year.  Gets things on her mind and this may be keeping her from sleeping.      Current Outpatient Medications   Medication Sig    buPROPion (WELLBUTRIN XL) 300 MG extended release tablet TAKE 1 TABLET BY MOUTH EVERY DAY IN THE MORNING    PROGESTERONE PO Take 300 mg by mouth daily    Pregnenolone 50

## 2024-02-23 DIAGNOSIS — Z72.0 TOBACCO USE: ICD-10-CM

## 2024-02-23 DIAGNOSIS — E89.0 POSTABLATIVE HYPOTHYROIDISM: ICD-10-CM

## 2024-03-05 LAB
T3 SERPL-MCNC: 77 NG/DL (ref 71–180)
T4 FREE SERPL-MCNC: 1.45 NG/DL (ref 0.82–1.77)
TSH SERPL DL<=0.005 MIU/L-ACNC: 0.55 UIU/ML (ref 0.45–4.5)

## 2024-03-20 ENCOUNTER — TELEPHONE (OUTPATIENT)
Age: 59
End: 2024-03-20

## 2024-03-20 DIAGNOSIS — I60.9 SAH (SUBARACHNOID HEMORRHAGE) (HCC): Primary | ICD-10-CM

## 2024-03-22 ENCOUNTER — PATIENT MESSAGE (OUTPATIENT)
Age: 59
End: 2024-03-22

## 2024-03-22 NOTE — TELEPHONE ENCOUNTER
Spoke to patient to confirm Diagnostic Angiogram on 4/22/2024 at [facility].   Arrival time 8:30 at Patient Registration. Main Hospital Entrance.  Blood work will be needed at least one week prior to procedure at a [facility]   [facility].  -No eating or drinking after midnight the night prior to Angiogram.  -Take all morning medications with sips of water the morning of the angiogram.  -You must have a  to drive you home upon discharge.  -Recommend you have someone with you for at least 24-48 hours post procedure.  -No metal or glue in hair.  Message sent via my also per request.

## 2024-04-01 DIAGNOSIS — I60.9 SAH (SUBARACHNOID HEMORRHAGE) (HCC): ICD-10-CM

## 2024-04-17 LAB
ALBUMIN SERPL-MCNC: 4.5 G/DL (ref 3.8–4.9)
ALBUMIN/GLOB SERPL: 1.8 {RATIO} (ref 1.2–2.2)
ALP SERPL-CCNC: 75 IU/L (ref 44–121)
ALT SERPL-CCNC: 22 IU/L (ref 0–32)
AST SERPL-CCNC: 26 IU/L (ref 0–40)
BILIRUB SERPL-MCNC: 0.3 MG/DL (ref 0–1.2)
BUN SERPL-MCNC: 23 MG/DL (ref 8–27)
BUN/CREAT SERPL: 22 (ref 12–28)
CALCIUM SERPL-MCNC: 10.3 MG/DL (ref 8.7–10.3)
CHLORIDE SERPL-SCNC: 105 MMOL/L (ref 96–106)
CO2 SERPL-SCNC: 20 MMOL/L (ref 20–29)
CREAT SERPL-MCNC: 1.06 MG/DL (ref 0.57–1)
EGFRCR SERPLBLD CKD-EPI 2021: 60 ML/MIN/1.73
ERYTHROCYTE [DISTWIDTH] IN BLOOD BY AUTOMATED COUNT: 11.9 % (ref 11.7–15.4)
GLOBULIN SER CALC-MCNC: 2.5 G/DL (ref 1.5–4.5)
GLUCOSE SERPL-MCNC: 85 MG/DL (ref 70–99)
HCT VFR BLD AUTO: 40.6 % (ref 34–46.6)
HGB BLD-MCNC: 14.1 G/DL (ref 11.1–15.9)
MCH RBC QN AUTO: 32.3 PG (ref 26.6–33)
MCHC RBC AUTO-ENTMCNC: 34.7 G/DL (ref 31.5–35.7)
MCV RBC AUTO: 93 FL (ref 79–97)
MORPHOLOGY BLD-IMP: NORMAL
PLATELET # BLD AUTO: 106 X10E3/UL (ref 150–450)
POTASSIUM SERPL-SCNC: 4.6 MMOL/L (ref 3.5–5.2)
PROT SERPL-MCNC: 7 G/DL (ref 6–8.5)
RBC # BLD AUTO: 4.36 X10E6/UL (ref 3.77–5.28)
SODIUM SERPL-SCNC: 142 MMOL/L (ref 134–144)
WBC # BLD AUTO: 7.1 X10E3/UL (ref 3.4–10.8)

## 2024-04-19 ENCOUNTER — TELEPHONE (OUTPATIENT)
Age: 59
End: 2024-04-19

## 2024-04-19 NOTE — TELEPHONE ENCOUNTER
Return call from patient regarding procedure on 4/22/2024.  Patient stated understanding and had no questions.

## 2024-04-19 NOTE — TELEPHONE ENCOUNTER
Message left for patient to confirm procedure on 4/22/2024.   Arrival time 8:30 am at Patient registration.    Reminders to patient:  -No eating or drinking after midnight the day prior to procedure.   -Take morning medications the morning of procedure with sips of water.   -Do not stop taking Blood Thinners if applicable unless notified by this office.  -You must have a  to drive you home upon discharge.  -Recommend you have someone with you for at least 24-48 hours post procedure.  -No metal of glue in hair.    Allergies reviewed.  Asked patient to return call to confirm receipt of message.

## 2024-04-22 ENCOUNTER — HOSPITAL ENCOUNTER (OUTPATIENT)
Facility: HOSPITAL | Age: 59
Discharge: HOME | End: 2024-04-22
Attending: RADIOLOGY | Admitting: RADIOLOGY
Payer: COMMERCIAL

## 2024-04-22 VITALS
SYSTOLIC BLOOD PRESSURE: 92 MMHG | WEIGHT: 160 LBS | TEMPERATURE: 97.2 F | RESPIRATION RATE: 20 BRPM | HEIGHT: 65 IN | HEART RATE: 64 BPM | DIASTOLIC BLOOD PRESSURE: 48 MMHG | OXYGEN SATURATION: 95 % | BODY MASS INDEX: 26.66 KG/M2

## 2024-04-22 DIAGNOSIS — I60.9 SAH (SUBARACHNOID HEMORRHAGE) (HCC): ICD-10-CM

## 2024-04-22 PROCEDURE — 6360000004 HC RX CONTRAST MEDICATION: Performed by: RADIOLOGY

## 2024-04-22 PROCEDURE — 2580000003 HC RX 258: Performed by: RADIOLOGY

## 2024-04-22 PROCEDURE — 2500000003 HC RX 250 WO HCPCS: Performed by: RADIOLOGY

## 2024-04-22 PROCEDURE — 99153 MOD SED SAME PHYS/QHP EA: CPT

## 2024-04-22 PROCEDURE — 6370000000 HC RX 637 (ALT 250 FOR IP): Performed by: RADIOLOGY

## 2024-04-22 PROCEDURE — APPNB60 APP NON BILLABLE TIME 46-60 MINS: Performed by: NURSE PRACTITIONER

## 2024-04-22 PROCEDURE — 6360000002 HC RX W HCPCS: Performed by: RADIOLOGY

## 2024-04-22 RX ORDER — LIDOCAINE AND PRILOCAINE 25; 25 MG/G; MG/G
CREAM TOPICAL PRN
Status: COMPLETED | OUTPATIENT
Start: 2024-04-22 | End: 2024-04-22

## 2024-04-22 RX ORDER — VERAPAMIL HYDROCHLORIDE 2.5 MG/ML
INJECTION, SOLUTION INTRAVENOUS PRN
Status: COMPLETED | OUTPATIENT
Start: 2024-04-22 | End: 2024-04-22

## 2024-04-22 RX ORDER — FENTANYL CITRATE 50 UG/ML
INJECTION, SOLUTION INTRAMUSCULAR; INTRAVENOUS PRN
Status: COMPLETED | OUTPATIENT
Start: 2024-04-22 | End: 2024-04-22

## 2024-04-22 RX ORDER — MIDAZOLAM HYDROCHLORIDE 2 MG/2ML
INJECTION, SOLUTION INTRAMUSCULAR; INTRAVENOUS PRN
Status: COMPLETED | OUTPATIENT
Start: 2024-04-22 | End: 2024-04-22

## 2024-04-22 RX ORDER — HEPARIN SODIUM 1000 [USP'U]/ML
INJECTION, SOLUTION INTRAVENOUS; SUBCUTANEOUS PRN
Status: COMPLETED | OUTPATIENT
Start: 2024-04-22 | End: 2024-04-22

## 2024-04-22 RX ORDER — SODIUM CHLORIDE 9 MG/ML
INJECTION, SOLUTION INTRAVENOUS CONTINUOUS PRN
Status: COMPLETED | OUTPATIENT
Start: 2024-04-22 | End: 2024-04-22

## 2024-04-22 RX ORDER — NITROGLYCERIN 20 MG/100ML
INJECTION INTRAVENOUS PRN
Status: COMPLETED | OUTPATIENT
Start: 2024-04-22 | End: 2024-04-22

## 2024-04-22 RX ORDER — LIDOCAINE HYDROCHLORIDE 10 MG/ML
INJECTION, SOLUTION EPIDURAL; INFILTRATION; INTRACAUDAL; PERINEURAL PRN
Status: COMPLETED | OUTPATIENT
Start: 2024-04-22 | End: 2024-04-22

## 2024-04-22 RX ADMIN — HEPARIN SODIUM 2000 UNITS: 1000 INJECTION INTRAVENOUS; SUBCUTANEOUS at 09:52

## 2024-04-22 RX ADMIN — FENTANYL CITRATE 50 MCG: 50 INJECTION, SOLUTION INTRAMUSCULAR; INTRAVENOUS at 09:45

## 2024-04-22 RX ADMIN — HEPARIN SODIUM: 1000 INJECTION INTRAVENOUS; SUBCUTANEOUS at 09:47

## 2024-04-22 RX ADMIN — HEPARIN SODIUM: 1000 INJECTION INTRAVENOUS; SUBCUTANEOUS at 09:46

## 2024-04-22 RX ADMIN — SODIUM CHLORIDE 125 ML/HR: 9 INJECTION, SOLUTION INTRAVENOUS at 09:28

## 2024-04-22 RX ADMIN — LIDOCAINE HYDROCHLORIDE 1 ML: 10 INJECTION, SOLUTION EPIDURAL; INFILTRATION; INTRACAUDAL; PERINEURAL at 09:45

## 2024-04-22 RX ADMIN — NITROGLYCERIN 1 INCH: 20 OINTMENT TOPICAL at 09:10

## 2024-04-22 RX ADMIN — VERAPAMIL HYDROCHLORIDE 2.5 MG: 2.5 INJECTION, SOLUTION INTRAVENOUS at 09:53

## 2024-04-22 RX ADMIN — IOPAMIDOL 75 ML: 612 INJECTION, SOLUTION INTRAVENOUS at 10:19

## 2024-04-22 RX ADMIN — MIDAZOLAM HYDROCHLORIDE 1 MG: 1 INJECTION, SOLUTION INTRAMUSCULAR; INTRAVENOUS at 09:45

## 2024-04-22 RX ADMIN — NITROGLYCERIN 200 MCG: 20 INJECTION INTRAVENOUS at 09:53

## 2024-04-22 RX ADMIN — LIDOCAINE AND PRILOCAINE 1 ML: 25; 25 CREAM TOPICAL at 09:10

## 2024-04-22 ASSESSMENT — PAIN - FUNCTIONAL ASSESSMENT: PAIN_FUNCTIONAL_ASSESSMENT: NONE - DENIES PAIN

## 2024-04-22 NOTE — H&P
Sig: Take 5,000 mcg by mouth daily   Calcium-Cholecalciferol-Zinc (VIACTIV CALCIUM IMMUNE) 650-20-5.5 MG-MCG-MG CHEW   Yes No   Sig: Take 650 mg by mouth daily   Cetirizine HCl 10 MG CAPS   Yes No   Sig: Take by mouth daily   Cholecalciferol (VITAMIN D3) 50 MCG (2000 UT) CAPS   Yes No   Sig: Take 1 capsule by mouth daily   LEVOXYL 100 MCG tablet 4/22/2024  No No   Sig: Take 1 tablet by mouth Daily BRAND MEDICALLY NECESSARY--Delete 125 mcg dose from profile   Melatonin 10 MG TABS   Yes No   Sig: Take 10 mg by mouth nightly   PROGESTERONE PO   Yes No   Sig: Take 300 mg by mouth daily   Pregnenolone 50 MG TABS   Yes No   Sig: Take 5 mg by mouth daily   Probiotic Product (UP4 PROBIOTICS PO)   Yes No   Sig: Take 220 mg by mouth daily   Sennosides (EX-LAX PO)   Yes No   Sig: Take 25 mg by mouth daily   Sennosides 8.6 MG CAPS   Yes No   Sig: Take by mouth   aspirin 81 MG chewable tablet Not Taking  No No   Sig: Take 1 tablet by mouth daily   Patient not taking: Reported on 4/22/2024   buPROPion (WELLBUTRIN XL) 300 MG extended release tablet 4/22/2024  No No   Sig: TAKE 1 TABLET BY MOUTH EVERY DAY IN THE MORNING   liothyronine (CYTOMEL) 5 MCG tablet   No No   Sig: TAKE 1 TAB DAILY WITH LEVOXYL   zinc 50 MG TABS tablet   Yes No   Sig: Take 1 tablet by mouth daily      Facility-Administered Medications: None       Allergies   Allergen Reactions    Cephalexin Rash    Erythromycin Rash    Thyroid Rash     With 60 mg tabs       Review of Systems:  Pertinent items are noted in the History of Present Illness.    Denies numbness, tingling, nausea, vomiting, difficulty swallowing or speaking, headache, blurred vision or dizziness.     Objective:     Vitals:    04/22/24 0843 04/22/24 0930   BP: 120/69 108/69   Pulse: 66 67   Resp: 11 12   Temp: 97.2 °F (36.2 °C)    TempSrc: Temporal    SpO2: 100% 100%   Weight: 72.6 kg (160 lb)    Height: 1.651 m (5' 5\")       Physical Exam:  GENERAL: Calm, cooperative, NAD  SKIN: Warm, dry, color

## 2024-04-22 NOTE — PROGRESS NOTES
0840: Pt arrives ambulatory to angio department accompanied by  Julius for diagnostic cerebral angiogram procedure. All assessments completed and consent was reviewed.  Education given was regarding procedure, moderate sedation, post-procedure care and  management/follow-up. Opportunity for questions was provided and all questions and concerns were addressed.     1215: Patient given copy of discharge instructions and verbalized understanding.  Patient wheeled to exit via wheelchair. Patient in NAD. No bleeding noted at puncture site.

## 2024-04-22 NOTE — BRIEF OP NOTE
Neuro-Interventional Surgery - Brief procedure note      Patient: Liset Vargas MRN: [id number]     YOB: 1965  Age: 58 y.o.  Sex: female      Service: Neuro-Interventional Surgery    Date of Procedure: 4/22/2024    Pre-Procedure Diagnosis: Intracranial aneurysm    Post-Procedure Diagnosis: SAME    Procedure(s): Catheter cerebral/cervical arteriogram    Vessels selected: Right vertebral artery, Right common carotid artery, and Left common carotid artery    Brief Description of Procedure: A small residual/recurrent component at dorsal aspect of the previously embolized basilar tip aneurysm is identified.  No additional aneurysm, AV malformation or hemodynamically significant stenosis noted.    Right radial artery puncture site hemostasis achieved using TR band. No immediate complications. No hematoma or oozing noted.  Distal hand perfusion remain unchanged post hemostasis.  Sterile dressing applied over the puncture site.      Anesthesia:Moderate Sedation    Estimated Blood Loss:  10 ml.    Specimens:  None    Implants:  None    Apparent Intraoperative Complications:  None immediate    Patient Condition:  Stable    Disposition:  Same day recovery unit    Attestation:  I performed the procedure.        Signed By: Maciel Diaz MD     April 22, 2024     [facility] NEUROINTERVENTIONAL SURGERY

## 2024-04-22 NOTE — DISCHARGE INSTRUCTIONS
HEAVY MACHINERY, DO NOT MAKE ANY LEGAL DECISIONS OR SIGN LEGAL DOCUMENTS FOR 24 HOURS. DO NOT DRINK ALCOHOL, TAKE ANY MEDICATIONS UNLESS PRESCRIBED BY YOUR DOCTOR. IF YOU ARE A CAREGIVER, SOMEONE SHOULD TAKE THAT ROLE FOR 24 HOURS.       Side effects of sedation medications and other medications used today have been reviewed  Those side effects can include but are not limited to: dizziness, drowsiness, poor balance, fatigue, sleepiness. Take precautions at home to prevent falls, such as assistance with walking or stairs if allowed and /or when needed or position changes. Allergic or adverse reactions could include nausea, itching, hives, dizziness, or anything else out of the ordinary.       Should you experience any of these significant changes, please call [phone] between the hours of 7:30 am and 3:30 pm or [phone] after hours. After hours, ask the  to page the X-ray Technologist, and describe the problem to the technologist. If you are experiencing chest pain, shortness of breath, altered mental state, unusual bleeding or any other emergent symptom you should call [phone] immediately.

## 2024-05-15 ENCOUNTER — OFFICE VISIT (OUTPATIENT)
Age: 59
End: 2024-05-15
Payer: COMMERCIAL

## 2024-05-15 VITALS
SYSTOLIC BLOOD PRESSURE: 118 MMHG | OXYGEN SATURATION: 100 % | WEIGHT: 160.2 LBS | BODY MASS INDEX: 26.69 KG/M2 | HEIGHT: 65 IN | TEMPERATURE: 97 F | DIASTOLIC BLOOD PRESSURE: 78 MMHG | HEART RATE: 65 BPM

## 2024-05-15 DIAGNOSIS — Z98.890 STATUS POST COIL EMBOLIZATION OF CEREBRAL ANEURYSM: ICD-10-CM

## 2024-05-15 DIAGNOSIS — I60.9 SAH (SUBARACHNOID HEMORRHAGE) (HCC): Primary | ICD-10-CM

## 2024-05-15 PROCEDURE — 99214 OFFICE O/P EST MOD 30 MIN: CPT | Performed by: RADIOLOGY

## 2024-05-15 RX ORDER — ASPIRIN 325 MG
325 TABLET ORAL DAILY
Qty: 30 TABLET | Refills: 3 | Status: SHIPPED | OUTPATIENT
Start: 2024-05-15

## 2024-05-15 RX ORDER — CLOPIDOGREL BISULFATE 75 MG/1
75 TABLET ORAL DAILY
Qty: 30 TABLET | Refills: 3 | Status: SHIPPED | OUTPATIENT
Start: 2024-05-15

## 2024-05-15 NOTE — PROGRESS NOTES
Diagnostic angiogram follow up for SAH.  Patient reports right radial puncture healed without difficulties.  No bruising, swelling or drainage noted at site.  Patient reports frequent headaches.  No new symptoms voiced.

## 2024-05-15 NOTE — PATIENT INSTRUCTIONS
You will have Stent Assisted Coil Embolization on 6/20/2024 at [facility].   Arrival time is 8:30 am at Patient Registration.  You will be contacted by Preadmission Testing to schedule an appointment for labs, chest xray, ekg.  Reminders to patient:  -No eating or drinking after midnight the day prior to procedure.  -Take morning medications the morning of procedure with sips of water.   -Do not stop taking Blood Thinners if applicable unless notified by this office.  -You must have a  to drive you home upon discharge.  -Recommend you have someone with you for at least 24-48 hours post procedure.  -No metal of glue in hair.    Start Plavix 75 mg daily and Aspirin  mg daily on 5/17/2024.

## 2024-05-16 ENCOUNTER — TELEPHONE (OUTPATIENT)
Age: 59
End: 2024-05-16

## 2024-06-07 ENCOUNTER — HOSPITAL ENCOUNTER (OUTPATIENT)
Facility: HOSPITAL | Age: 59
Discharge: HOME | End: 2024-06-07
Payer: COMMERCIAL

## 2024-06-07 VITALS
SYSTOLIC BLOOD PRESSURE: 104 MMHG | WEIGHT: 160.8 LBS | HEART RATE: 76 BPM | TEMPERATURE: 98.4 F | HEIGHT: 65 IN | DIASTOLIC BLOOD PRESSURE: 68 MMHG | BODY MASS INDEX: 26.79 KG/M2

## 2024-06-07 LAB
ABO + RH BLD: NORMAL
ALBUMIN SERPL-MCNC: 3.8 G/DL (ref 3.5–5)
ALBUMIN/GLOB SERPL: 1.1 (ref 1.1–2.2)
ALP SERPL-CCNC: 94 U/L (ref 45–117)
ALT SERPL-CCNC: 29 U/L (ref 12–78)
ANION GAP SERPL CALC-SCNC: 5 MMOL/L (ref 5–15)
ASPIRIN: 380 ARU
AST SERPL-CCNC: 17 U/L (ref 15–37)
BASOPHILS # BLD: 0.1 K/UL (ref 0–0.1)
BASOPHILS NFR BLD: 1 % (ref 0–1)
BILIRUB SERPL-MCNC: 0.3 MG/DL (ref 0.2–1)
BLOOD GROUP ANTIBODIES SERPL: NORMAL
BUN SERPL-MCNC: 17 MG/DL (ref 6–20)
BUN/CREAT SERPL: 15 (ref 12–20)
CALCIUM SERPL-MCNC: 9.8 MG/DL (ref 8.5–10.1)
CHLORIDE SERPL-SCNC: 111 MMOL/L (ref 97–108)
CO2 SERPL-SCNC: 25 MMOL/L (ref 21–32)
CREAT SERPL-MCNC: 1.1 MG/DL (ref 0.55–1.02)
DIFFERENTIAL METHOD BLD: NORMAL
EKG ATRIAL RATE: 69 BPM
EKG DIAGNOSIS: NORMAL
EKG P AXIS: 83 DEGREES
EKG P-R INTERVAL: 146 MS
EKG Q-T INTERVAL: 390 MS
EKG QRS DURATION: 76 MS
EKG QTC CALCULATION (BAZETT): 417 MS
EKG R AXIS: 71 DEGREES
EKG T AXIS: 60 DEGREES
EKG VENTRICULAR RATE: 69 BPM
EOSINOPHIL # BLD: 0.2 K/UL (ref 0–0.4)
EOSINOPHIL NFR BLD: 3 % (ref 0–7)
ERYTHROCYTE [DISTWIDTH] IN BLOOD BY AUTOMATED COUNT: 12.7 % (ref 11.5–14.5)
GLOBULIN SER CALC-MCNC: 3.4 G/DL (ref 2–4)
GLUCOSE SERPL-MCNC: 120 MG/DL (ref 65–100)
HCT VFR BLD AUTO: 40.5 % (ref 35–47)
HGB BLD-MCNC: 13.6 G/DL (ref 11.5–16)
IMM GRANULOCYTES # BLD AUTO: 0 K/UL (ref 0–0.04)
IMM GRANULOCYTES NFR BLD AUTO: 0 % (ref 0–0.5)
LYMPHOCYTES # BLD: 2.1 K/UL (ref 0.8–3.5)
LYMPHOCYTES NFR BLD: 34 % (ref 12–49)
MCH RBC QN AUTO: 32.2 PG (ref 26–34)
MCHC RBC AUTO-ENTMCNC: 33.6 G/DL (ref 30–36.5)
MCV RBC AUTO: 96 FL (ref 80–99)
MONOCYTES # BLD: 0.4 K/UL (ref 0–1)
MONOCYTES NFR BLD: 6 % (ref 5–13)
NEUTS SEG # BLD: 3.4 K/UL (ref 1.8–8)
NEUTS SEG NFR BLD: 56 % (ref 32–75)
NRBC # BLD: 0 K/UL (ref 0–0.01)
NRBC BLD-RTO: 0 PER 100 WBC
P2Y12 PLT RESPONSE: 64 PRU (ref 194–418)
PLATELET # BLD AUTO: 216 K/UL (ref 150–400)
PMV BLD AUTO: 8.9 FL (ref 8.9–12.9)
POTASSIUM SERPL-SCNC: 3.8 MMOL/L (ref 3.5–5.1)
PROT SERPL-MCNC: 7.2 G/DL (ref 6.4–8.2)
RBC # BLD AUTO: 4.22 M/UL (ref 3.8–5.2)
SODIUM SERPL-SCNC: 141 MMOL/L (ref 136–145)
SPECIMEN EXP DATE BLD: NORMAL
WBC # BLD AUTO: 6.2 K/UL (ref 3.6–11)

## 2024-06-07 PROCEDURE — 36415 COLL VENOUS BLD VENIPUNCTURE: CPT

## 2024-06-07 PROCEDURE — 86850 RBC ANTIBODY SCREEN: CPT

## 2024-06-07 PROCEDURE — 85025 COMPLETE CBC W/AUTO DIFF WBC: CPT

## 2024-06-07 PROCEDURE — 86901 BLOOD TYPING SEROLOGIC RH(D): CPT

## 2024-06-07 PROCEDURE — 85576 BLOOD PLATELET AGGREGATION: CPT

## 2024-06-07 PROCEDURE — 93005 ELECTROCARDIOGRAM TRACING: CPT | Performed by: RADIOLOGY

## 2024-06-07 PROCEDURE — 80053 COMPREHEN METABOLIC PANEL: CPT

## 2024-06-07 PROCEDURE — 86900 BLOOD TYPING SEROLOGIC ABO: CPT

## 2024-06-07 RX ORDER — SENNOSIDES A AND B 8.6 MG/1
2 TABLET, FILM COATED ORAL DAILY
COMMUNITY

## 2024-06-07 ASSESSMENT — PAIN DESCRIPTION - PAIN TYPE: TYPE: CHRONIC PAIN

## 2024-06-07 ASSESSMENT — PAIN - FUNCTIONAL ASSESSMENT: PAIN_FUNCTIONAL_ASSESSMENT: PREVENTS OR INTERFERES SOME ACTIVE ACTIVITIES AND ADLS

## 2024-06-07 ASSESSMENT — PAIN DESCRIPTION - FREQUENCY: FREQUENCY: CONTINUOUS

## 2024-06-07 ASSESSMENT — PAIN SCALES - GENERAL: PAINLEVEL_OUTOF10: 3

## 2024-06-07 ASSESSMENT — PAIN DESCRIPTION - ONSET: ONSET: ON-GOING

## 2024-06-07 ASSESSMENT — PAIN DESCRIPTION - ORIENTATION: ORIENTATION: RIGHT;LEFT

## 2024-06-07 ASSESSMENT — PAIN DESCRIPTION - DESCRIPTORS: DESCRIPTORS: ACHING;SORE

## 2024-06-07 ASSESSMENT — PAIN DESCRIPTION - LOCATION: LOCATION: HAND;FOOT

## 2024-06-07 NOTE — PERIOP NOTE
PT STATES SHE WAS INSTRUCTED TO CONTINUE TAKING HER PLAVIX AND ASPIRIN DAILY BY DR. WELSH'S OFFICE.

## 2024-06-07 NOTE — PERIOP NOTE
[address] [address]   Radiology(Angio)          [phone]  PRE-ADMISSION TESTING    [phone]     Surgery Date:  6/20/24       Is surgery arrival time given by surgeon?  Y  NO    If “NO”, [facility] staff will call you between 4 and 7pm the day before your surgery with your arrival time. (If your surgery is on a Monday, we will call you the Friday before.)    Call [phone] after 7pm Monday-Friday if you did not receive this call.    INSTRUCTIONS BEFORE YOUR SURGERY   When You  Arrive Arrive at [facility] Patient Access on 1st floor the day of your surgery.  Have your insurance card, photo ID,living will/advanced directive/POA (if applicable),  and any copayment (if needed)   Food   and   Drink NO food or drink after midnight the night before surgery    This means NO water, gum, mints, coffee, juice, etc.  No alcohol (beer, wine, liquor) or marijuana (smoking) 24 hours, edibles (3 days). Stop smoking cigarettes 14 days before surgery (helps w/healing and breathing).   Medications to   TAKE   Morning of Surgery MEDICATIONS TO TAKE THE MORNING OF SURGERY WITH A SIP OF WATER:     BUPROPION, LEVOXYL, LIOTHYRONINE    You may take these medications, IF NEEDED, the morning of surgery: NONE    Ask your surgeon/prescribing doctor for instructions on taking or stopping these medications prior to surgery: FOLLOW THE INSTRUCTIONS GIVEN TO YOU BY YOUR SURGEON FOR YOUR PLAVIX AND ASPIRIN     Medications to STOP  before surgery Non-Steroidal anti-inflammatory Drugs (NSAID's): for example, Ibuprofen (Advil, Motrin), Naproxen (Aleve) 3 days  STOP all herbal supplements and vitamins(unless prescribed by your doctor), and fish oil for 7 days  Other:  (Pain medications not listed above, including Tylenol may be taken up until 4 hours prior to arrival time)   Blood  Thinners If you take Aspirin, Plavix, Coumadin, or any blood-thinning or anti-blood clot  appointment/call.    The patient was contacted in person.     She verbalized understanding of all instructions does not need reinforcement.

## 2024-06-18 ENCOUNTER — TELEPHONE (OUTPATIENT)
Age: 59
End: 2024-06-18

## 2024-06-18 NOTE — TELEPHONE ENCOUNTER
[facility] has approved patient's NIS procedure and in patient stay for 6/20/2024.    Authorization number: [id number]

## 2024-06-19 ENCOUNTER — TELEPHONE (OUTPATIENT)
Age: 59
End: 2024-06-19

## 2024-06-19 ENCOUNTER — ANESTHESIA EVENT (OUTPATIENT)
Facility: HOSPITAL | Age: 59
End: 2024-06-19
Payer: COMMERCIAL

## 2024-06-19 NOTE — TELEPHONE ENCOUNTER
Patient called back because she got disconnected from her call with the nurse who was going over pre-procedure information.    I reviewed nurse's pre-procedure note in chart again with patient.

## 2024-06-19 NOTE — TELEPHONE ENCOUNTER
Spoke to patient to confirm procedure tomorrow.   Arrival time 8:30 am at Patient registration.  Reminder to patient:  -No eating or drinking after midnight the day prior to procedure.   -Take morning medications the morning of procedure with sips of water.   -Do not stop taking Blood Thinners if applicable unless notified by this office.  -You must have a  to drive you home upon discharge.  -Recommend you have someone with you for at least 24-48 hours post procedure.  -No metal of glue in hair.    Allergies confirmed.

## 2024-06-20 ENCOUNTER — ANESTHESIA (OUTPATIENT)
Facility: HOSPITAL | Age: 59
End: 2024-06-20
Payer: COMMERCIAL

## 2024-06-20 ENCOUNTER — HOSPITAL ENCOUNTER (INPATIENT)
Facility: HOSPITAL | Age: 59
LOS: 1 days | Discharge: HOME | DRG: 026 | End: 2024-06-21
Attending: RADIOLOGY | Admitting: RADIOLOGY
Payer: COMMERCIAL

## 2024-06-20 DIAGNOSIS — I60.9 SAH (SUBARACHNOID HEMORRHAGE) (HCC): ICD-10-CM

## 2024-06-20 PROBLEM — I67.1 CEREBRAL ANEURYSM: Status: ACTIVE | Noted: 2024-06-20

## 2024-06-20 LAB
ACT BLD: 140 SECS (ref 79–138)
ACT BLD: 244 SECS (ref 79–138)
ACT BLD: 262 SECS (ref 79–138)
ACT BLD: 293 SECS (ref 79–138)
ALBUMIN SERPL-MCNC: 3.3 G/DL (ref 3.5–5)
ALBUMIN/GLOB SERPL: 1 (ref 1.1–2.2)
ALP SERPL-CCNC: 63 U/L (ref 45–117)
ALT SERPL-CCNC: 26 U/L (ref 12–78)
ANION GAP SERPL CALC-SCNC: 5 MMOL/L (ref 5–15)
ASPIRIN: 377 ARU
AST SERPL-CCNC: 17 U/L (ref 15–37)
BILIRUB SERPL-MCNC: 0.6 MG/DL (ref 0.2–1)
BUN SERPL-MCNC: 15 MG/DL (ref 6–20)
BUN/CREAT SERPL: 16 (ref 12–20)
CALCIUM SERPL-MCNC: 8.6 MG/DL (ref 8.5–10.1)
CHLORIDE SERPL-SCNC: 112 MMOL/L (ref 97–108)
CO2 SERPL-SCNC: 21 MMOL/L (ref 21–32)
CREAT SERPL-MCNC: 0.95 MG/DL (ref 0.55–1.02)
ERYTHROCYTE [DISTWIDTH] IN BLOOD BY AUTOMATED COUNT: 12.5 % (ref 11.5–14.5)
GLOBULIN SER CALC-MCNC: 3.3 G/DL (ref 2–4)
GLUCOSE SERPL-MCNC: 155 MG/DL (ref 65–100)
HCT VFR BLD AUTO: 38.6 % (ref 35–47)
HGB BLD-MCNC: 12.6 G/DL (ref 11.5–16)
MCH RBC QN AUTO: 31.5 PG (ref 26–34)
MCHC RBC AUTO-ENTMCNC: 32.6 G/DL (ref 30–36.5)
MCV RBC AUTO: 96.5 FL (ref 80–99)
NRBC # BLD: 0 K/UL (ref 0–0.01)
NRBC BLD-RTO: 0 PER 100 WBC
P2Y12 PLT RESPONSE: 71 PRU (ref 194–418)
PLATELET # BLD AUTO: 206 K/UL (ref 150–400)
PMV BLD AUTO: 8.2 FL (ref 8.9–12.9)
POTASSIUM SERPL-SCNC: 4.1 MMOL/L (ref 3.5–5.1)
PROT SERPL-MCNC: 6.6 G/DL (ref 6.4–8.2)
RBC # BLD AUTO: 4 M/UL (ref 3.8–5.2)
SODIUM SERPL-SCNC: 138 MMOL/L (ref 136–145)
WBC # BLD AUTO: 7.9 K/UL (ref 3.6–11)

## 2024-06-20 PROCEDURE — 94760 N-INVAS EAR/PLS OXIMETRY 1: CPT

## 2024-06-20 PROCEDURE — 3700000001 HC ADD 15 MINUTES (ANESTHESIA)

## 2024-06-20 PROCEDURE — 6370000000 HC RX 637 (ALT 250 FOR IP): Performed by: RADIOLOGY

## 2024-06-20 PROCEDURE — 85027 COMPLETE CBC AUTOMATED: CPT

## 2024-06-20 PROCEDURE — 2580000003 HC RX 258: Performed by: RADIOLOGY

## 2024-06-20 PROCEDURE — 2000000000 HC ICU R&B

## 2024-06-20 PROCEDURE — 2700000000 HC OXYGEN THERAPY PER DAY

## 2024-06-20 PROCEDURE — 99232 SBSQ HOSP IP/OBS MODERATE 35: CPT | Performed by: NURSE PRACTITIONER

## 2024-06-20 PROCEDURE — 2580000003 HC RX 258: Performed by: NURSE PRACTITIONER

## 2024-06-20 PROCEDURE — C1877 STENT, NON-COAT/COV W/O DEL: HCPCS

## 2024-06-20 PROCEDURE — 6360000004 HC RX CONTRAST MEDICATION: Performed by: RADIOLOGY

## 2024-06-20 PROCEDURE — APPNB30 APP NON BILLABLE TIME 0-30 MINS: Performed by: NURSE PRACTITIONER

## 2024-06-20 PROCEDURE — 75898 FOLLOW-UP ANGIOGRAPHY: CPT | Performed by: RADIOLOGY

## 2024-06-20 PROCEDURE — 2500000003 HC RX 250 WO HCPCS: Performed by: NURSE PRACTITIONER

## 2024-06-20 PROCEDURE — 85347 COAGULATION TIME ACTIVATED: CPT

## 2024-06-20 PROCEDURE — 6360000002 HC RX W HCPCS: Performed by: NURSE PRACTITIONER

## 2024-06-20 PROCEDURE — 2500000003 HC RX 250 WO HCPCS: Performed by: RADIOLOGY

## 2024-06-20 PROCEDURE — 6360000002 HC RX W HCPCS: Performed by: ANESTHESIOLOGY

## 2024-06-20 PROCEDURE — 80053 COMPREHEN METABOLIC PANEL: CPT

## 2024-06-20 PROCEDURE — 75710 ARTERY X-RAYS ARM/LEG: CPT

## 2024-06-20 PROCEDURE — 61624 TCAT PERM OCCLS/EMBOLJ CNS: CPT | Performed by: RADIOLOGY

## 2024-06-20 PROCEDURE — 36415 COLL VENOUS BLD VENIPUNCTURE: CPT

## 2024-06-20 PROCEDURE — C1889 IMPLANT/INSERT DEVICE, NOC: HCPCS

## 2024-06-20 PROCEDURE — 85576 BLOOD PLATELET AGGREGATION: CPT

## 2024-06-20 PROCEDURE — 03VG3DZ RESTRICTION OF INTRACRANIAL ARTERY WITH INTRALUMINAL DEVICE, PERCUTANEOUS APPROACH: ICD-10-PCS | Performed by: RADIOLOGY

## 2024-06-20 PROCEDURE — B31D1ZZ FLUOROSCOPY OF RIGHT VERTEBRAL ARTERY USING LOW OSMOLAR CONTRAST: ICD-10-PCS | Performed by: RADIOLOGY

## 2024-06-20 PROCEDURE — 75894 X-RAYS TRANSCATH THERAPY: CPT | Performed by: RADIOLOGY

## 2024-06-20 PROCEDURE — 3700000000 HC ANESTHESIA ATTENDED CARE

## 2024-06-20 PROCEDURE — APPNB45 APP NON BILLABLE 31-45 MINUTES: Performed by: NURSE PRACTITIONER

## 2024-06-20 PROCEDURE — 6360000002 HC RX W HCPCS: Performed by: RADIOLOGY

## 2024-06-20 RX ORDER — SODIUM CHLORIDE 0.9 % (FLUSH) 0.9 %
5-40 SYRINGE (ML) INJECTION PRN
Status: DISCONTINUED | OUTPATIENT
Start: 2024-06-20 | End: 2024-06-21 | Stop reason: HOSPADM

## 2024-06-20 RX ORDER — SODIUM CHLORIDE 9 MG/ML
INJECTION, SOLUTION INTRAVENOUS CONTINUOUS
Status: DISCONTINUED | OUTPATIENT
Start: 2024-06-20 | End: 2024-06-20

## 2024-06-20 RX ORDER — SODIUM BICARBONATE 42 MG/ML
INJECTION, SOLUTION INTRAVENOUS PRN
Status: COMPLETED | OUTPATIENT
Start: 2024-06-20 | End: 2024-06-20

## 2024-06-20 RX ORDER — NEOSTIGMINE METHYLSULFATE 1 MG/ML
INJECTION, SOLUTION INTRAVENOUS PRN
Status: DISCONTINUED | OUTPATIENT
Start: 2024-06-20 | End: 2024-06-20 | Stop reason: SDUPTHER

## 2024-06-20 RX ORDER — NITROGLYCERIN 20 MG/100ML
INJECTION INTRAVENOUS PRN
Status: COMPLETED | OUTPATIENT
Start: 2024-06-20 | End: 2024-06-20

## 2024-06-20 RX ORDER — HEPARIN SODIUM 1000 [USP'U]/ML
INJECTION, SOLUTION INTRAVENOUS; SUBCUTANEOUS PRN
Status: DISCONTINUED | OUTPATIENT
Start: 2024-06-20 | End: 2024-06-20 | Stop reason: SDUPTHER

## 2024-06-20 RX ORDER — SENNOSIDES A AND B 8.6 MG/1
2 TABLET, FILM COATED ORAL DAILY
Status: DISCONTINUED | OUTPATIENT
Start: 2024-06-21 | End: 2024-06-21 | Stop reason: HOSPADM

## 2024-06-20 RX ORDER — PHENYLEPHRINE HCL IN 0.9% NACL 0.4MG/10ML
SYRINGE (ML) INTRAVENOUS PRN
Status: DISCONTINUED | OUTPATIENT
Start: 2024-06-20 | End: 2024-06-20 | Stop reason: SDUPTHER

## 2024-06-20 RX ORDER — LIDOCAINE HYDROCHLORIDE 10 MG/ML
INJECTION, SOLUTION EPIDURAL; INFILTRATION; INTRACAUDAL; PERINEURAL PRN
Status: COMPLETED | OUTPATIENT
Start: 2024-06-20 | End: 2024-06-20

## 2024-06-20 RX ORDER — GLYCOPYRROLATE 0.2 MG/ML
INJECTION INTRAMUSCULAR; INTRAVENOUS PRN
Status: DISCONTINUED | OUTPATIENT
Start: 2024-06-20 | End: 2024-06-20 | Stop reason: SDUPTHER

## 2024-06-20 RX ORDER — SODIUM CHLORIDE, SODIUM LACTATE, POTASSIUM CHLORIDE, CALCIUM CHLORIDE 600; 310; 30; 20 MG/100ML; MG/100ML; MG/100ML; MG/100ML
INJECTION, SOLUTION INTRAVENOUS CONTINUOUS
Status: DISCONTINUED | OUTPATIENT
Start: 2024-06-20 | End: 2024-06-21 | Stop reason: HOSPADM

## 2024-06-20 RX ORDER — FENTANYL CITRATE 50 UG/ML
INJECTION, SOLUTION INTRAMUSCULAR; INTRAVENOUS PRN
Status: DISCONTINUED | OUTPATIENT
Start: 2024-06-20 | End: 2024-06-20 | Stop reason: SDUPTHER

## 2024-06-20 RX ORDER — ROCURONIUM BROMIDE 10 MG/ML
INJECTION, SOLUTION INTRAVENOUS PRN
Status: DISCONTINUED | OUTPATIENT
Start: 2024-06-20 | End: 2024-06-20 | Stop reason: SDUPTHER

## 2024-06-20 RX ORDER — LIDOCAINE HYDROCHLORIDE 20 MG/ML
INJECTION, SOLUTION EPIDURAL; INFILTRATION; INTRACAUDAL; PERINEURAL PRN
Status: DISCONTINUED | OUTPATIENT
Start: 2024-06-20 | End: 2024-06-20 | Stop reason: SDUPTHER

## 2024-06-20 RX ORDER — BUPROPION HYDROCHLORIDE 300 MG/1
300 TABLET ORAL EVERY MORNING
Status: DISCONTINUED | OUTPATIENT
Start: 2024-06-21 | End: 2024-06-21 | Stop reason: HOSPADM

## 2024-06-20 RX ORDER — ASPIRIN 325 MG
325 TABLET ORAL DAILY
Status: DISCONTINUED | OUTPATIENT
Start: 2024-06-21 | End: 2024-06-21 | Stop reason: HOSPADM

## 2024-06-20 RX ORDER — SODIUM CHLORIDE 9 MG/ML
INJECTION, SOLUTION INTRAVENOUS PRN
Status: DISCONTINUED | OUTPATIENT
Start: 2024-06-20 | End: 2024-06-21 | Stop reason: HOSPADM

## 2024-06-20 RX ORDER — LIDOCAINE AND PRILOCAINE 25; 25 MG/G; MG/G
CREAM TOPICAL PRN
Status: COMPLETED | OUTPATIENT
Start: 2024-06-20 | End: 2024-06-20

## 2024-06-20 RX ORDER — LEVOTHYROXINE SODIUM 0.1 MG/1
100 TABLET ORAL
Status: DISCONTINUED | OUTPATIENT
Start: 2024-06-21 | End: 2024-06-21 | Stop reason: HOSPADM

## 2024-06-20 RX ORDER — HEPARIN SODIUM 1000 [USP'U]/ML
INJECTION, SOLUTION INTRAVENOUS; SUBCUTANEOUS PRN
Status: COMPLETED | OUTPATIENT
Start: 2024-06-20 | End: 2024-06-20

## 2024-06-20 RX ORDER — CLOPIDOGREL BISULFATE 75 MG/1
75 TABLET ORAL DAILY
Status: DISCONTINUED | OUTPATIENT
Start: 2024-06-21 | End: 2024-06-21 | Stop reason: HOSPADM

## 2024-06-20 RX ORDER — SODIUM CHLORIDE, SODIUM LACTATE, POTASSIUM CHLORIDE, CALCIUM CHLORIDE 600; 310; 30; 20 MG/100ML; MG/100ML; MG/100ML; MG/100ML
INJECTION, SOLUTION INTRAVENOUS CONTINUOUS PRN
Status: DISCONTINUED | OUTPATIENT
Start: 2024-06-20 | End: 2024-06-20 | Stop reason: SDUPTHER

## 2024-06-20 RX ORDER — ONDANSETRON 2 MG/ML
INJECTION INTRAMUSCULAR; INTRAVENOUS PRN
Status: DISCONTINUED | OUTPATIENT
Start: 2024-06-20 | End: 2024-06-20 | Stop reason: SDUPTHER

## 2024-06-20 RX ORDER — DEXAMETHASONE SODIUM PHOSPHATE 4 MG/ML
INJECTION, SOLUTION INTRA-ARTICULAR; INTRALESIONAL; INTRAMUSCULAR; INTRAVENOUS; SOFT TISSUE PRN
Status: DISCONTINUED | OUTPATIENT
Start: 2024-06-20 | End: 2024-06-20 | Stop reason: SDUPTHER

## 2024-06-20 RX ORDER — ACETAMINOPHEN 325 MG/1
650 TABLET ORAL EVERY 4 HOURS PRN
Status: DISCONTINUED | OUTPATIENT
Start: 2024-06-20 | End: 2024-06-21 | Stop reason: HOSPADM

## 2024-06-20 RX ORDER — LIOTHYRONINE SODIUM 5 UG/1
5 TABLET ORAL
Status: DISCONTINUED | OUTPATIENT
Start: 2024-06-21 | End: 2024-06-21 | Stop reason: HOSPADM

## 2024-06-20 RX ORDER — VERAPAMIL HYDROCHLORIDE 2.5 MG/ML
INJECTION, SOLUTION INTRAVENOUS PRN
Status: COMPLETED | OUTPATIENT
Start: 2024-06-20 | End: 2024-06-20

## 2024-06-20 RX ORDER — MIDAZOLAM HYDROCHLORIDE 2 MG/2ML
INJECTION, SOLUTION INTRAMUSCULAR; INTRAVENOUS PRN
Status: COMPLETED | OUTPATIENT
Start: 2024-06-20 | End: 2024-06-20

## 2024-06-20 RX ORDER — SUCCINYLCHOLINE/SOD CL,ISO/PF 200MG/10ML
SYRINGE (ML) INTRAVENOUS PRN
Status: DISCONTINUED | OUTPATIENT
Start: 2024-06-20 | End: 2024-06-20 | Stop reason: SDUPTHER

## 2024-06-20 RX ORDER — ONDANSETRON 4 MG/1
4 TABLET, ORALLY DISINTEGRATING ORAL EVERY 8 HOURS PRN
Status: DISCONTINUED | OUTPATIENT
Start: 2024-06-20 | End: 2024-06-21 | Stop reason: HOSPADM

## 2024-06-20 RX ORDER — SODIUM CHLORIDE 0.9 % (FLUSH) 0.9 %
5-40 SYRINGE (ML) INJECTION EVERY 12 HOURS SCHEDULED
Status: DISCONTINUED | OUTPATIENT
Start: 2024-06-20 | End: 2024-06-21 | Stop reason: HOSPADM

## 2024-06-20 RX ORDER — PROGESTERONE 100 MG/1
300 CAPSULE ORAL EVERY EVENING
Status: DISCONTINUED | OUTPATIENT
Start: 2024-06-20 | End: 2024-06-21 | Stop reason: HOSPADM

## 2024-06-20 RX ORDER — ONDANSETRON 2 MG/ML
4 INJECTION INTRAMUSCULAR; INTRAVENOUS EVERY 6 HOURS PRN
Status: DISCONTINUED | OUTPATIENT
Start: 2024-06-20 | End: 2024-06-21 | Stop reason: HOSPADM

## 2024-06-20 RX ADMIN — HEPARIN SODIUM 4000 UNITS: 1000 INJECTION, SOLUTION INTRAVENOUS; SUBCUTANEOUS at 11:16

## 2024-06-20 RX ADMIN — ROCURONIUM BROMIDE 10 MG: 10 SOLUTION INTRAVENOUS at 10:48

## 2024-06-20 RX ADMIN — PHENYLEPHRINE HYDROCHLORIDE 40 MCG/MIN: 10 INJECTION INTRAVENOUS at 10:52

## 2024-06-20 RX ADMIN — NITROGLYCERIN 1 INCH: 20 OINTMENT TOPICAL at 11:08

## 2024-06-20 RX ADMIN — ONDANSETRON 4 MG: 2 INJECTION INTRAMUSCULAR; INTRAVENOUS at 10:58

## 2024-06-20 RX ADMIN — SODIUM BICARBONATE 0.5 MEQ: 42 INJECTION, SOLUTION INTRAVENOUS at 11:15

## 2024-06-20 RX ADMIN — LIDOCAINE AND PRILOCAINE 1 ML: 25; 25 CREAM TOPICAL at 11:08

## 2024-06-20 RX ADMIN — SODIUM CHLORIDE, POTASSIUM CHLORIDE, SODIUM LACTATE AND CALCIUM CHLORIDE: 600; 310; 30; 20 INJECTION, SOLUTION INTRAVENOUS at 20:00

## 2024-06-20 RX ADMIN — PROPOFOL 50 MG: 10 INJECTION, EMULSION INTRAVENOUS at 12:02

## 2024-06-20 RX ADMIN — Medication 80 MCG: at 12:09

## 2024-06-20 RX ADMIN — LIDOCAINE HYDROCHLORIDE 1 ML: 10 INJECTION, SOLUTION EPIDURAL; INFILTRATION; INTRACAUDAL; PERINEURAL at 11:09

## 2024-06-20 RX ADMIN — GLYCOPYRROLATE 0.4 MG: 0.2 INJECTION INTRAMUSCULAR; INTRAVENOUS at 12:10

## 2024-06-20 RX ADMIN — SODIUM CHLORIDE, PRESERVATIVE FREE 10 ML: 5 INJECTION INTRAVENOUS at 20:01

## 2024-06-20 RX ADMIN — DEXAMETHASONE SODIUM PHOSPHATE 4 MG: 4 INJECTION, SOLUTION INTRAMUSCULAR; INTRAVENOUS at 10:58

## 2024-06-20 RX ADMIN — SODIUM CHLORIDE: 9 INJECTION, SOLUTION INTRAVENOUS at 13:44

## 2024-06-20 RX ADMIN — HEPARIN SODIUM 2000 UNITS: 1000 INJECTION INTRAVENOUS; SUBCUTANEOUS at 11:14

## 2024-06-20 RX ADMIN — HEPARIN SODIUM 6000 ML: 1000 INJECTION INTRAVENOUS; SUBCUTANEOUS at 12:10

## 2024-06-20 RX ADMIN — SODIUM CHLORIDE, SODIUM LACTATE, POTASSIUM CHLORIDE, AND CALCIUM CHLORIDE: 600; 310; 30; 20 INJECTION, SOLUTION INTRAVENOUS at 10:42

## 2024-06-20 RX ADMIN — HEPARIN SODIUM 2000 UNITS: 1000 INJECTION, SOLUTION INTRAVENOUS; SUBCUTANEOUS at 11:44

## 2024-06-20 RX ADMIN — PROPOFOL 150 MG: 10 INJECTION, EMULSION INTRAVENOUS at 10:48

## 2024-06-20 RX ADMIN — ROCURONIUM BROMIDE 40 MG: 10 SOLUTION INTRAVENOUS at 10:51

## 2024-06-20 RX ADMIN — FENTANYL CITRATE 50 MCG: 50 INJECTION, SOLUTION INTRAMUSCULAR; INTRAVENOUS at 12:26

## 2024-06-20 RX ADMIN — HEPARIN SODIUM 2000 UNITS: 1000 INJECTION, SOLUTION INTRAVENOUS; SUBCUTANEOUS at 12:00

## 2024-06-20 RX ADMIN — LIDOCAINE HYDROCHLORIDE 60 MG: 20 INJECTION, SOLUTION EPIDURAL; INFILTRATION; INTRACAUDAL; PERINEURAL at 10:48

## 2024-06-20 RX ADMIN — Medication 200 MG: at 10:48

## 2024-06-20 RX ADMIN — VERAPAMIL HYDROCHLORIDE 5 MG: 2.5 INJECTION, SOLUTION INTRAVENOUS at 11:32

## 2024-06-20 RX ADMIN — Medication 80 MCG: at 10:48

## 2024-06-20 RX ADMIN — IOPAMIDOL 56 ML: 612 INJECTION, SOLUTION INTRAVENOUS at 12:10

## 2024-06-20 RX ADMIN — Medication 80 MCG: at 10:54

## 2024-06-20 RX ADMIN — Medication 80 MCG: at 11:16

## 2024-06-20 RX ADMIN — FENTANYL CITRATE 50 MCG: 50 INJECTION, SOLUTION INTRAMUSCULAR; INTRAVENOUS at 10:48

## 2024-06-20 RX ADMIN — Medication 3 MG: at 12:10

## 2024-06-20 RX ADMIN — NITROGLYCERIN 200 MCG: 20 INJECTION INTRAVENOUS at 11:15

## 2024-06-20 RX ADMIN — MIDAZOLAM HYDROCHLORIDE 2 MG: 1 INJECTION, SOLUTION INTRAMUSCULAR; INTRAVENOUS at 10:02

## 2024-06-20 RX ADMIN — Medication 80 MCG: at 10:57

## 2024-06-20 RX ADMIN — VERAPAMIL HYDROCHLORIDE 2.5 MG: 2.5 INJECTION, SOLUTION INTRAVENOUS at 11:14

## 2024-06-20 ASSESSMENT — PAIN - FUNCTIONAL ASSESSMENT: PAIN_FUNCTIONAL_ASSESSMENT: NONE - DENIES PAIN

## 2024-06-20 ASSESSMENT — PAIN SCALES - GENERAL: PAINLEVEL_OUTOF10: 0

## 2024-06-20 NOTE — PROGRESS NOTES
TRANSFER - OUT REPORT:    Verbal report given to Michel ZAVALA on Liset Vargas  being transferred to ICU rm 18 for routine progression of patient care       Report consisted of patient's Situation, Background, Assessment and   Recommendations(SBAR).     Information from the following report(s) Nurse Handoff Report, MAR, Recent Results, and Med Rec Status was reviewed with the receiving nurse.           Lines:   Peripheral IV 06/20/24 Right;Dorsal Hand (Active)       Arterial Line 06/20/24 Radial (Active)        Opportunity for questions and clarification was provided.      Patient transported with:  Monitor and O2 @ 4lpm, Sinus bebe 56.    Patient stable for transport.

## 2024-06-20 NOTE — PROGRESS NOTES
Neurointerventional Surgery Clinic Note        Patient: Liset Vargas MRN: [id number]  SSN: [id number]    YOB: 1965  Age: 58 y.o.  Sex: female      Subjective:      Liset Vargas is a 58 y.o. female who is being seen for follow-up after undergoing a catheter arteriogram on 2024.  Patient has history of subarachnoid hemorrhage and endovascular embolization of basilar tip aneurysm in 2023.  Patient has overall done well from her subarachnoid hemorrhage and initial embolization without any residual neurological deficits.    Past Medical History:   Diagnosis Date    Arthritis     Cerebral aneurysm 10/13/2023    Postablative hypothyroidism 3/28/14    s/p 16 mCi for Grave's disease    Seasonal allergic rhinitis     Stroke (HCC) 10/2023     Past Surgical History:   Procedure Laterality Date    COLONOSCOPY      IR MECHANICAL ART THROMBECTOMY INTRACRANIAL  10/13/2023    IR MECHANICAL ART THROMBECTOMY INTRACRANIAL 10/13/2023 [facility] RAD ANGIO IR    LEFT OOPHORECTOMY Left     REFRACTIVE SURGERY Right     TUBAL LIGATION      WISDOM TOOTH EXTRACTION        Family History   Problem Relation Age of Onset    Thyroid Disease Mother         hypothyroidism    Depression Mother     Hypertension Mother     Dementia Mother     Thyroid Disease Father         hypothyroidism    Cancer Father         bladder    Heart Disease Father     Thyroid Disease Sister         hypothyroidism    Depression Sister     Diabetes Brother     Cancer Brother         tumor on shoulder blade    No Known Problems Maternal Uncle     Heart Disease Paternal Uncle     Migraines Maternal Grandmother     Parkinson's Disease Maternal Grandfather     Anesth Problems Neg Hx      Social History     Tobacco Use    Smoking status: Former     Current packs/day: 0.00     Types: Cigarettes     Quit date: 10/13/2023     Years since quittin.6    Smokeless tobacco: Never   Substance Use Topics    Alcohol use: Not Currently      No current

## 2024-06-20 NOTE — PROGRESS NOTES
NIS Brief Post Procedure Progress Note:    Patient is s/p cerebral angiogram and stent-assisted coil embolization of basilar tip aneurysm. She is doing well. Denies any headache or vision changes. She is eating lunch.      Physical Exam:  Gen: NAD, calm, cooperative  Neuro: A&Ox4. Follows commands. Speech clear. No aphasia. Affect normal. PERRL, 3 mm bilaterally. Visual fields intact. No disconjugate gaze present. EOMI. Face symmetric. Palate symmetric. Tongue midline. Jacki spontaneously. Strength 5/5 in UE and LE BL. Negative drift. Bulk and tone normal. No involuntary movements. Sensation intact. No neglect. FTN and HTS intact bilaterally. Gait deferred.  Extremities: no cyanosis or edema, radial pulses present via palpation.   Skin: Cool in hands, otherwise warm, dry, color appropriate for ethnicity. Right wrist arteriotomy site intact with TR band in place. No active bleeding, bruising, or hematoma noted.       PLAN:  Follow neurovascular checks per protocol  Check aspirin and P2Y12 test post procedure  Check CBC and CMP  SBP goal 100-160  Resume home meds    Plan discussed with Dr. Diaz, RN, patient, and family.       RILEY Macias - NP  Neurocritical Care Nurse Practitioner    Addendum 1620: Aspirin test therapeutic at 377 and P2Y12 therapeutic at 71. Continue aspirin 325 mg daily and Plavix 75 mg daily.     1648: RN report still oozing of blood from right wrist puncture site when deflating balloon on TR band. Pt reports some tingling in her right thumb. I removed TR band and no active bleeding noted. Noted small bruise at site. No hematoma noted. Radial pulses faint with palpation bilaterally, but also present via doppler. Applied a 4x4 gauze dressing with transparent film. Skin warm to touch. Patient reports right hand feels back to normal after removing TR band.

## 2024-06-20 NOTE — OR NURSING
Patient placed on angio table withouth assistance. IR Staff and anesthesia present at bedside. Anesthesia to remain at bedside to manage pt airway, medications, VS and pt status.

## 2024-06-20 NOTE — H&P
Neurointerventional Surgery History and Physical      Patient: Liset Vargas MRN: [id number]  SSN: [id number]    YOB: 1965  Age: 58 y.o.  Sex: female      History of Present Illness:   Liset Vargas is a 58 y.o. right-handed female with a PMH of grave's disease-postablative hypothyroidism, allergic rhinitis, and history of a SAH in October 2023 due to a ruptured basilar tip aneurysm who had a SAH due to ruptured basilar tip aneurysm s/p cerebral angiogram and coiling on 10/13/23 by Dr. Diaz. She has been doing well and has no residual issues. She presents to angio today for a scheduled stent-assisted coil embolization of recurrent basilar tip aneurysm. Patient underwent a follow up DSA 4/22/2024 which showed approximately 4 mm x 2.5 mm recurrence at the dorsal aspect of the embolized basilar tip aneurysm. Patient has been maintained on Aspirin 325 mg daily and Plavix 75 mg daily.     Patient denies any current headache, vision changes, dizziness, speech difficulty, fever, chills, chest pain, SOB, numbness, tingling, weakness, nausea, vomiting, balance, or coordination issues.     Patient Active Problem List    Diagnosis Date Noted    Subarachnoid hemorrhage due to ruptured aneurysm (HCC) 10/22/2023    Status post coil embolization of cerebral aneurysm 10/21/2023    Acute intractable headache 10/15/2023    Subdural hematoma (HCC) 10/13/2023    SAH (subarachnoid hemorrhage) (HCC) 10/13/2023    Tobacco abuse 07/13/2018    Menopausal symptom 06/15/2017    Postablative hypothyroidism 03/28/2014     Current Facility-Administered Medications   Medication Dose Route Frequency Provider Last Rate Last Admin    midazolam PF (VERSED) injection    PRN Fabiano Luna, DO   2 mg at 06/20/24 1002     Allergies   Allergen Reactions    Cephalexin Rash    Erythromycin Rash    Thyroid Rash     With 60 mg tabs    ARMOUR THYROID MEDICATION     Past Medical History:   Diagnosis Date    Arthritis     Cerebral aneurysm

## 2024-06-20 NOTE — ANESTHESIA PRE PROCEDURE
Not Answered      Vital Signs (Current): There were no vitals filed for this visit.                                           BP Readings from Last 3 Encounters:   06/07/24 104/68   05/15/24 118/78   04/22/24 (!) 92/48       NPO Status:                                                   Date of last liquid consumption: 06/19/24                        Date of last solid food consumption: 06/19/24    BMI:   Wt Readings from Last 3 Encounters:   06/07/24 72.9 kg (160 lb 12.8 oz)   05/15/24 72.7 kg (160 lb 3.2 oz)   04/22/24 72.6 kg (160 lb)     There is no height or weight on file to calculate BMI.    CBC:   Lab Results   Component Value Date/Time    WBC 6.2 06/07/2024 03:38 PM    RBC 4.22 06/07/2024 03:38 PM    HGB 13.6 06/07/2024 03:38 PM    HCT 40.5 06/07/2024 03:38 PM    MCV 96.0 06/07/2024 03:38 PM    RDW 12.7 06/07/2024 03:38 PM     06/07/2024 03:38 PM       CMP:   Lab Results   Component Value Date/Time     06/07/2024 03:38 PM    K 3.8 06/07/2024 03:38 PM     06/07/2024 03:38 PM    CO2 25 06/07/2024 03:38 PM    BUN 17 06/07/2024 03:38 PM    CREATININE 1.10 06/07/2024 03:38 PM    GFRAA 80 11/09/2020 04:23 PM    AGRATIO 1.8 04/15/2024 04:37 PM    LABGLOM 58 06/07/2024 03:38 PM    LABGLOM 60 04/15/2024 04:37 PM    GLUCOSE 120 06/07/2024 03:38 PM    GLUCOSE 85 04/15/2024 04:37 PM    CALCIUM 9.8 06/07/2024 03:38 PM    BILITOT 0.3 06/07/2024 03:38 PM    ALKPHOS 94 06/07/2024 03:38 PM    ALKPHOS 75 04/15/2024 04:37 PM    AST 17 06/07/2024 03:38 PM    ALT 29 06/07/2024 03:38 PM       POC Tests: No results for input(s): \"POCGLU\", \"POCNA\", \"POCK\", \"POCCL\", \"POCBUN\", \"POCHEMO\", \"POCHCT\" in the last 72 hours.    Coags: No results found for: \"PROTIME\", \"INR\", \"APTT\"    HCG (If Applicable): No results found for: \"PREGTESTUR\", \"PREGSERUM\", \"HCG\", \"HCGQUANT\"     ABGs: No results found for: \"PHART\", \"PO2ART\", \"AFW5JJS\", \"ZIF6GRV\", \"BEART\", \"X5QXABME\"     Type & Screen (If Applicable):  No results found for:

## 2024-06-20 NOTE — BRIEF OP NOTE
Neuro-Interventional Surgery - Brief procedure note      Patient: Liset Vargas MRN: [id number]     YOB: 1965  Age: 58 y.o.  Sex: female      Service: Neuro-Interventional Surgery    Date of Procedure: 6/20/2024    Pre-Procedure Diagnosis: Intracranial aneurysm    Post-Procedure Diagnosis: SAME    Procedure(s): Stent assisted detachable coil embolization of basilar tip aneurysm    Vessels selected: Right vertebral artery and Basilar artery, right posterior cerebral artery    Brief Description of Procedure: Successful detachable coil embolization of the recurrent basilar tip aneurysm performed with stent assistance without immediate complications.    Right radial artery puncture site hemostasis achieved using TR band. No immediate complications. No hematoma or oozing noted.  Distal hand perfusion remain unchanged post hemostasis.  Sterile dressing applied over the puncture site.      Anesthesia:General    Estimated Blood Loss: 20 ml.    Specimens:  None    Implants:     3 mm x 21 mm [place] stent    3 mm x 8 cm tetra coil    1.5 mm x 2 cm tetra coil    Apparent Intraoperative Complications:  None immediate    Patient Condition:  Stable    Disposition:  Intensive care unit    Attestation:  I performed the procedure.        Signed By: Maciel Diaz MD     June 20, 2024     [facility] NEUROINTERVENTIONAL SURGERY

## 2024-06-20 NOTE — ANESTHESIA POSTPROCEDURE EVALUATION
Department of Anesthesiology  Postprocedure Note    Patient: Liset Vargas  MRN: [id number]  YOB: 1965  Date of evaluation: 6/20/2024    Procedure Summary       Date: 06/20/24 Room / Location: [facility] ANGIO IR    Anesthesia Start: 1042 Anesthesia Stop: 1241    Procedure: IR ARCH UNI CAR CERV CEREBRAL Diagnosis:       SAH (subarachnoid hemorrhage) (HCC)      SAH (subarachnoid hemorrhage) (HCC)      Cerebral aneurysm      (Stent assisted coil embolization of basilar tip aneurysm.)    Scheduled Providers: Maciel Diaz MD; Fabiano Luna DO Responsible Provider: Fabiano Luna DO    Anesthesia Type: General ASA Status: 3            Anesthesia Type: General    Lucy Phase I: Lucy Score: 10    Lucy Phase II:      Anesthesia Post Evaluation    Patient location during evaluation: PACU  Patient participation: complete - patient participated  Level of consciousness: awake and alert  Pain score: 0  Airway patency: patent  Nausea & Vomiting: no nausea  Cardiovascular status: hemodynamically stable  Respiratory status: acceptable  Hydration status: euvolemic  Comments: Seen, no complaints   Pain management: adequate    No notable events documented.

## 2024-06-20 NOTE — PROGRESS NOTES
4 Eyes Skin Assessment     NAME:  Liset Vargas  YOB: 1965  MEDICAL RECORD NUMBER:  [id number]    The patient is being assessed for  Admission    I agree that at least one RN has performed a thorough Head to Toe Skin Assessment on the patient. ALL assessment sites listed below have been assessed.      Areas assessed by both nurses:    Head, Face, Ears, Shoulders, Back, Chest, Arms, Elbows, Hands, Sacrum. Buttock, Coccyx, Ischium, Legs. Feet and Heels, and Under Medical Devices         Does the Patient have a Wound? No noted wound(s)       Kirk Prevention initiated by RN: Yes  Wound Care Orders initiated by RN: No    Pressure Injury (Stage 3,4, Unstageable, DTI, NWPT, and Complex wounds) if present, place Wound referral order by RN under : No    New Ostomies, if present place, Ostomy referral order under : No     Nurse 1 eSignature: Electronically signed by Rosaura Mc RN on 6/20/24 at 3:29 PM EDT    **SHARE this note so that the co-signing nurse can place an eSignature**    Nurse 2 eSignature: Electronically signed by Penny Thapa RN on 6/20/24 at 3:30 PM EDT

## 2024-06-20 NOTE — ANESTHESIA PROCEDURE NOTES
Arterial Line:    An arterial line was placed using ultrasound guidance and surface landmarks, in the pre-op for the following indication(s): continuous blood pressure monitoring and blood sampling needed.    A 20 gauge (size), 1 and 3/4 inch (length), Arrow (type) catheter was placed, Seldinger technique used, into the left radial artery, secured by suture, tape and Tegaderm.  Anesthesia type: Local  Local infiltration: Injection    Events:  patient tolerated procedure well with no complications.    Additional notes:  Collateral perfusion verified   Performed: Anesthesiologist   Preanesthetic Checklist  Completed: patient identified, IV checked, risks and benefits discussed, surgical/procedural consents, equipment checked, pre-op evaluation, timeout performed, anesthesia consent given, oxygen available and monitors applied/VS acknowledged

## 2024-06-21 ENCOUNTER — TELEPHONE (OUTPATIENT)
Age: 59
End: 2024-06-21

## 2024-06-21 VITALS
RESPIRATION RATE: 20 BRPM | TEMPERATURE: 97.9 F | DIASTOLIC BLOOD PRESSURE: 58 MMHG | BODY MASS INDEX: 26.44 KG/M2 | SYSTOLIC BLOOD PRESSURE: 87 MMHG | OXYGEN SATURATION: 99 % | WEIGHT: 158.7 LBS | HEIGHT: 65 IN | HEART RATE: 80 BPM

## 2024-06-21 LAB
ALBUMIN SERPL-MCNC: 2.9 G/DL (ref 3.5–5)
ALBUMIN/GLOB SERPL: 0.9 (ref 1.1–2.2)
ALP SERPL-CCNC: 69 U/L (ref 45–117)
ALT SERPL-CCNC: 44 U/L (ref 12–78)
ANION GAP SERPL CALC-SCNC: 4 MMOL/L (ref 5–15)
ANION GAP SERPL CALC-SCNC: 8 MMOL/L (ref 5–15)
AST SERPL-CCNC: 35 U/L (ref 15–37)
BILIRUB SERPL-MCNC: 0.2 MG/DL (ref 0.2–1)
BUN SERPL-MCNC: 22 MG/DL (ref 6–20)
BUN SERPL-MCNC: 22 MG/DL (ref 6–20)
BUN/CREAT SERPL: 16 (ref 12–20)
BUN/CREAT SERPL: 19 (ref 12–20)
CALCIUM SERPL-MCNC: 8.6 MG/DL (ref 8.5–10.1)
CALCIUM SERPL-MCNC: 9.2 MG/DL (ref 8.5–10.1)
CHLORIDE SERPL-SCNC: 112 MMOL/L (ref 97–108)
CHLORIDE SERPL-SCNC: 112 MMOL/L (ref 97–108)
CO2 SERPL-SCNC: 21 MMOL/L (ref 21–32)
CO2 SERPL-SCNC: 24 MMOL/L (ref 21–32)
CREAT SERPL-MCNC: 1.13 MG/DL (ref 0.55–1.02)
CREAT SERPL-MCNC: 1.37 MG/DL (ref 0.55–1.02)
ERYTHROCYTE [DISTWIDTH] IN BLOOD BY AUTOMATED COUNT: 12.5 % (ref 11.5–14.5)
GLOBULIN SER CALC-MCNC: 3.1 G/DL (ref 2–4)
GLUCOSE SERPL-MCNC: 105 MG/DL (ref 65–100)
GLUCOSE SERPL-MCNC: 142 MG/DL (ref 65–100)
HCT VFR BLD AUTO: 34.5 % (ref 35–47)
HGB BLD-MCNC: 11.4 G/DL (ref 11.5–16)
MCH RBC QN AUTO: 31.4 PG (ref 26–34)
MCHC RBC AUTO-ENTMCNC: 33 G/DL (ref 30–36.5)
MCV RBC AUTO: 95 FL (ref 80–99)
NRBC # BLD: 0 K/UL (ref 0–0.01)
NRBC BLD-RTO: 0 PER 100 WBC
PLATELET # BLD AUTO: 200 K/UL (ref 150–400)
PMV BLD AUTO: 8.3 FL (ref 8.9–12.9)
POTASSIUM SERPL-SCNC: 3.5 MMOL/L (ref 3.5–5.1)
POTASSIUM SERPL-SCNC: 4 MMOL/L (ref 3.5–5.1)
PROT SERPL-MCNC: 6 G/DL (ref 6.4–8.2)
RBC # BLD AUTO: 3.63 M/UL (ref 3.8–5.2)
SODIUM SERPL-SCNC: 140 MMOL/L (ref 136–145)
SODIUM SERPL-SCNC: 141 MMOL/L (ref 136–145)
WBC # BLD AUTO: 9.6 K/UL (ref 3.6–11)

## 2024-06-21 PROCEDURE — 99239 HOSP IP/OBS DSCHRG MGMT >30: CPT | Performed by: NURSE PRACTITIONER

## 2024-06-21 PROCEDURE — 6370000000 HC RX 637 (ALT 250 FOR IP): Performed by: NURSE PRACTITIONER

## 2024-06-21 PROCEDURE — 85027 COMPLETE CBC AUTOMATED: CPT

## 2024-06-21 PROCEDURE — 2580000003 HC RX 258: Performed by: NURSE PRACTITIONER

## 2024-06-21 PROCEDURE — 80053 COMPREHEN METABOLIC PANEL: CPT

## 2024-06-21 PROCEDURE — 36415 COLL VENOUS BLD VENIPUNCTURE: CPT

## 2024-06-21 RX ORDER — SODIUM CHLORIDE, SODIUM LACTATE, POTASSIUM CHLORIDE, AND CALCIUM CHLORIDE .6; .31; .03; .02 G/100ML; G/100ML; G/100ML; G/100ML
250 INJECTION, SOLUTION INTRAVENOUS ONCE
Status: COMPLETED | OUTPATIENT
Start: 2024-06-21 | End: 2024-06-21

## 2024-06-21 RX ADMIN — BUPROPION HYDROCHLORIDE 300 MG: 300 TABLET, EXTENDED RELEASE ORAL at 08:45

## 2024-06-21 RX ADMIN — SODIUM CHLORIDE, POTASSIUM CHLORIDE, SODIUM LACTATE AND CALCIUM CHLORIDE 250 ML: 600; 310; 30; 20 INJECTION, SOLUTION INTRAVENOUS at 08:46

## 2024-06-21 RX ADMIN — SENNOSIDES 17.2 MG: 8.6 TABLET, FILM COATED ORAL at 08:45

## 2024-06-21 RX ADMIN — ACETAMINOPHEN 650 MG: 325 TABLET ORAL at 01:56

## 2024-06-21 RX ADMIN — LIOTHYRONINE SODIUM 5 MCG: 5 TABLET ORAL at 06:31

## 2024-06-21 RX ADMIN — CLOPIDOGREL BISULFATE 75 MG: 75 TABLET, FILM COATED ORAL at 08:45

## 2024-06-21 RX ADMIN — LEVOTHYROXINE SODIUM 100 MCG: 0.1 TABLET ORAL at 06:31

## 2024-06-21 RX ADMIN — ASPIRIN 325 MG: 325 TABLET ORAL at 08:45

## 2024-06-21 ASSESSMENT — PAIN SCALES - WONG BAKER
WONGBAKER_NUMERICALRESPONSE: NO HURT
WONGBAKER_NUMERICALRESPONSE: NO HURT

## 2024-06-21 ASSESSMENT — PAIN SCALES - GENERAL
PAINLEVEL_OUTOF10: 0
PAINLEVEL_OUTOF10: 3
PAINLEVEL_OUTOF10: 0

## 2024-06-21 ASSESSMENT — PAIN DESCRIPTION - DESCRIPTORS: DESCRIPTORS: ACHING

## 2024-06-21 ASSESSMENT — PAIN DESCRIPTION - LOCATION: LOCATION: HEAD

## 2024-06-21 NOTE — PLAN OF CARE
Problem: Chronic Conditions and Co-morbidities  Goal: Patient's chronic conditions and co-morbidity symptoms are monitored and maintained or improved  Outcome: Progressing     Problem: Discharge Planning  Goal: Discharge to home or other facility with appropriate resources  Outcome: Progressing     Problem: ABCDS Injury Assessment  Goal: Absence of physical injury  Outcome: Progressing     Problem: Safety - Adult  Goal: Free from fall injury  Outcome: Progressing

## 2024-06-21 NOTE — DISCHARGE SUMMARY
Neurointerventional Surgery Discharge Summary  [address] [facility] [facility]  [address]  [place], [place]  [phone]    Patient: Liset Vargas MRN: [id number]  SSN: [id number]    YOB: 1965  Age: 58 y.o.  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 Successful detachable coil embolization of the recurrent basilar tip aneurysm performed with stent assistance without immediate complications.     OFFICE NUMBER: [phone] , you can reach the on call physician 24/7 even during non-business hours     ADMITTING DIAGNOSES & HOSPITAL COURSE:     ICD-10-CM    1. 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 Diaz. The procedure was performed in the Interventional Radiology suite under general anesthesia. The patient tolerated the procedure well without complications. Following extubation, patient was transferred to ICU, fully recovered and returned to neurological baseline. The patient was admitted overnight for continued monitoring due to potential risks of stroke and thrombosis. There were no neurological events overnight. The patient had periods of issues with low blood pressure with BP in the high 80s-90s/40's-50's. Patient has baseline normal, but borderline low BP with SBP in the 100's. She was started on IV fluids post procedure. She also had an increase in her creatinine from 0.95 to 1.37 on AM chemistry labs and BUN was 22.I suspect this was related to hypotension and contrast from recent  commands.    Cranial Nerves:   PERRL.      Visual fields full to confrontation.     Extraocular movements intact.       Facial sensation intact.     Full facial strength, no asymmetry.      No dysarthria. Tongue protrudes to midline, palate elevates symmetrically.      Shoulder shrug 5/5 bilaterally.    Motor:    No pronator drift.      Bulk and tone normal.      5/5 power in all extremities proximally and distally.     No involuntary movements.    Sensation:    Sensation intact throughout to light touch. No neglect.     Coordination & Gait: FTN and HTS intact with no ataxia present. Steady gait.     DIET:   Normal diet    PLAN:  Discharge home with self care.    FOLLOW UP APPOINTMENTS:   1.  Follow up in the [facility] clinic with Dr. Diaz on July 24, 2024 at 3:00 PM.  2   Please make an appointment to follow up with PCP within the next week to have metabolic panel rechecked and follow up on BP given low to normal BP issues at times. Routed discharge summary to PCP.     ACTIVITY:   Do not lift anything over 10 lbs for two weeks. Try to limit going up and down stairs as much as possible. Rest and hydrate. May resume all physical activities as tolerated after 2 weeks.     WOUND CARE:   Monitor for signs and sx of infection including fever, expanding inflammation and discolored drainage. Report any changes in temperature in affected extremity, numbness, weakness or hematoma.  If you experience any increased bruising or bleeding at your puncture site either outside or under the skin please apply direct, firm pressure for 20 minutes. Keep track of the bruising at the site by marking it with a pen or marker. If the bruising continues to be dark purple or blue in color, OR is expanding, please call our office to let the on call doctor know. We have someone on call 24/7. You may shower normally and cleanse the site with gentle soap. Do not soak in the bathtub. Do not apply any lotions, creams, powders, or other cosmetic products

## 2024-06-21 NOTE — PROGRESS NOTES
0300) Discussed pt soft pressures SBP <100 and MAP <65 with RONALD Blancas. No concerns at this time as this is pt baseline. Pt is asymptomatic. Denies dizziness, vision changes, chest pain, N/V. No additional needs at this time. Labs obtained and sent     0500) discussed AM lab results with RONALD Blancas. LR increased to 100ml/hr and 0800 labs ordered for recollect.

## 2024-06-21 NOTE — TELEPHONE ENCOUNTER
Care Transitions Initial Follow Up Call    Outreach made within 2 business days of discharge: Yes    Patient: Liset Vargas Patient : 1965   MRN: [id number]  Reason for Admission: There are no discharge diagnoses documented for the most recent discharge.  Discharge Date: 24       Spoke with: Patient    Discharge department/facility: [facility] Interactive Patient Contact:  Was patient able to fill all prescriptions: No: None Needed  Was patient instructed to bring all medications to the follow-up visit: Yes  Is patient taking all medications as directed in the discharge summary? No new Rx's  Does patient understand their discharge instructions: Yes  Does patient have questions or concerns that need addressed prior to 7-14 day follow up office visit: no    Scheduled appointment with PCP within 7-14 days    Follow Up  Future Appointments   Date Time Provider Department Center   2024  3:10 PM Adrien Conklin MD [facility] BS [facility]   2024  4:10 PM Mt Mendez MD [facility] BS AMB   2024  3:00 PM Maciel Diaz MD [facility] BS [facility]       Dominik Cortes LPN

## 2024-06-21 NOTE — PROGRESS NOTES
Neurocritical Care Brief Progress Note:  57 y.o. female with a PMH of grave's disease-post ablative hypothyroidism, tobacco abuse, and basilar tip aneurysm s/p coil embolization on 10/13/23 by Dr. Diaz. F/U dsa 4/22/2024 which showed approximately 4 mm x 2.5 mm recurrence at the dorsal aspect of the embolized basilar tip aneurysm.     Pt presents to angio today 06/20/24 for a scheduled stent-assisted coil embolization of recurrent basilar tip aneurysm by Dr. Diaz without any complications. Right radial arteriotomy site is C/D/I without oozing, hematoma, tenderness except very subtle redness/bruising. + pulses. Skin color appropriate and warm for ethnicity.      0500  Morning labs with mild ARMANDO suspect contrast-induced. Will continue with IVF LR at 100ml/hr till 0800 and repeat labs prior to dc home. Patient remained neuro intact with no issues overnight.     Review of Systems:   No headache, eye, ear nose, throat problems; no coughing or wheezing or shortness of breath, No chest pain or orthopnea, no abdominal pain, nausea or vomiting, No pain in the body or extremities, no psychiatric, neurological, endocrine, hematological or cardiac complaints except as noted above.      Physical Exam:   Blood pressure (!) 106/54, pulse 80, temperature 98.1 °F (36.7 °C), temperature source Oral, resp. rate 18, height 1.651 m (5' 5\"), weight 73.4 kg (161 lb 13.1 oz), SpO2 98 %. Net IO Since Admission: 489.88 mL [06/20/24 2132]    GEN: Cooperative, Calm, Well developed, well nourished patient in NAD  HEENT: Normocephalic. Non-icter, no congestion  Lungs: CTA bilaterally ant, non-labored breathing, RA  Cardiac: S1,S2, normal rate and rhythm, with no murmurs. no gallops  Abdomen: Normal bowel sounds, no distention, soft, non-tender  Extremities: 2+ Radial pulses, no clubbing, cyanosis, or edema  Skin: no rashes or lesions noted      NEURO:  Mental status: Oriented to time, situation, place and person. Able to follow commands  appropriately  Cranial Nerves:  II-XII intact; Attention and fund of knowledge is appropriate/intact. Speech/Language is intact/fluent. Normal recent and remote memory. EOMI, PERRL, 4mm,  no nystagmus, no ptosis. No dysarthria or aphasia. Full facial strength, no asymmetry. Hearing intact bilaterally. Tongue protrudes to midline, palate elevates symmetrically. Shrug Shoulders B/L  Motor: Normal bulk and tone, 5/5 strength x 4 extremities proximally and distally; No involuntary movements.  Coordination: Intact FTN and HTS testing  Reflexes:  +2 throughout, down going toes bilaterally   Sensation: Intact x 4 extremities to light touch  Gait: Deferred        Continue current plan per NIS.    Case d/w: SARI Jacques NP, primary nurse, and patient      spent 35 minutes of time involved in chart review, lab review, imaging review       Magalis Serrano [facility]-NP  Neurocritical Care Nurse Practitioner  [phone]

## 2024-06-21 NOTE — PROGRESS NOTES
0730: Bedside shift change report given to Shamika  (oncoming nurse) by Lelia (offgoing nurse). Report included the following information Nurse Handoff Report, Index, Intake/Output, and MAR.       1045: Art line d/c without complications. PIV removed without complications. Discharge paperwork given to patient. Patient reports no further questions at this time.     1100: Pt transported via wheelchair with RN to discharge lot. VSS at time of discharge.

## 2024-06-28 DIAGNOSIS — Z72.0 TOBACCO USE: ICD-10-CM

## 2024-06-28 DIAGNOSIS — E89.0 POSTABLATIVE HYPOTHYROIDISM: ICD-10-CM

## 2024-07-03 LAB
T3 SERPL-MCNC: 95 NG/DL (ref 71–180)
T4 FREE SERPL-MCNC: 1.44 NG/DL (ref 0.82–1.77)
TSH SERPL DL<=0.005 MIU/L-ACNC: 1.03 UIU/ML (ref 0.45–4.5)

## 2024-07-09 ENCOUNTER — OFFICE VISIT (OUTPATIENT)
Age: 59
End: 2024-07-09
Payer: COMMERCIAL

## 2024-07-09 VITALS
WEIGHT: 163.2 LBS | DIASTOLIC BLOOD PRESSURE: 72 MMHG | HEIGHT: 65 IN | BODY MASS INDEX: 27.19 KG/M2 | HEART RATE: 88 BPM | SYSTOLIC BLOOD PRESSURE: 118 MMHG

## 2024-07-09 DIAGNOSIS — E89.0 POSTABLATIVE HYPOTHYROIDISM: Primary | ICD-10-CM

## 2024-07-09 DIAGNOSIS — Z72.0 TOBACCO USE: ICD-10-CM

## 2024-07-09 PROCEDURE — 99214 OFFICE O/P EST MOD 30 MIN: CPT | Performed by: INTERNAL MEDICINE

## 2024-07-09 NOTE — PATIENT INSTRUCTIONS
1) Your TSH (thyroid test) is perfect so I will keep your dose the same.    2) I will plan on seeing you back in one year but if you feel you need to have your labs checked sooner, please let me know.

## 2024-07-09 NOTE — PROGRESS NOTES
Chief Complaint   Patient presents with    Thyroid Problem     PCP and pharmacy confirmed       History of Present Illness: Liset Vargas is a 58 y.o. female here for follow up of thyroid.  Weight up 5 lbs since last visit in 1/24.  Just had aneurysm repair on 6/20/24 and this went well.  Had her hormone pellet insertion in 3/24 and then again this morning.  Compliant with levoxyl 100 mcg daily and cytomel 5 mcg daily.  Overall energy has been good.  Has had more trouble with joint pain and stiffness in both her hands and feet but not in her knees and elbows.  Will be seeing Dr. Horvath later this week.  Has remained tobacco free and still using wellbutrin and this has helped with the cravings.  Bowels are regular.  No change in skin, hair or nails.      Current Outpatient Medications   Medication Sig    TURMERIC PO Take 2 tablets by mouth Daily    senna (SENOKOT) 8.6 MG tablet Take 2 tablets by mouth daily    clopidogrel (PLAVIX) 75 MG tablet Take 1 tablet by mouth daily    aspirin (EDWARD ASPIRIN) 325 MG tablet Take 1 tablet by mouth daily    LEVOXYL 100 MCG tablet Take 1 tablet by mouth Daily BRAND MEDICALLY NECESSARY--Delete 125 mcg dose from profile    buPROPion (WELLBUTRIN XL) 300 MG extended release tablet TAKE 1 TABLET BY MOUTH EVERY DAY IN THE MORNING    PROGESTERONE PO Take 300 mg by mouth every evening    Pregnenolone 50 MG TABS Take 50 mg by mouth every evening    Sennosides (EX-LAX PO) Take 25 mg by mouth as needed    Cholecalciferol (VITAMIN D3) 50 MCG (2000 UT) CAPS Take 1 capsule by mouth daily    Probiotic Product (UP4 PROBIOTICS PO) Take 220 mg by mouth daily    Melatonin 10 MG TABS Take 10 mg by mouth nightly as needed    Calcium-Cholecalciferol-Zinc (VIACTIV CALCIUM IMMUNE) 650-20-5.5 MG-MCG-MG CHEW Take 650 mg by mouth daily    liothyronine (CYTOMEL) 5 MCG tablet TAKE 1 TAB DAILY WITH LEVOXYL (Patient taking differently: Take 1 tablet by mouth daily PT TAKES W/ LEVOXYL)    Biotin 10 MG CAPS

## 2024-07-31 ENCOUNTER — OFFICE VISIT (OUTPATIENT)
Age: 59
End: 2024-07-31
Payer: COMMERCIAL

## 2024-07-31 DIAGNOSIS — I60.9 SAH (SUBARACHNOID HEMORRHAGE) (HCC): Primary | ICD-10-CM

## 2024-07-31 PROCEDURE — 99213 OFFICE O/P EST LOW 20 MIN: CPT | Performed by: RADIOLOGY

## 2024-07-31 NOTE — PATIENT INSTRUCTIONS
Follow up in October 2024 after imaging is completed.  See attached paperwork to schedule imaging.  May hold Plavix 3 days prior to Colonoscopy and restart as soon as possible after per provider.

## 2024-07-31 NOTE — PROGRESS NOTES
Neurointerventional Surgery Clinic Note        Patient: Liset Vargas MRN: [id number]  SSN: [id number]    YOB: 1965  Age: 58 y.o.  Sex: female      Subjective:      Liset Vargas is a 58 y.o. female who is being seen for follow-up.  Patient underwent stent assisted coil embolization of a recurrence at base of a previously embolized basilar tip aneurysm.  Patient has history of subarachnoid hemorrhage from the basilar tip aneurysm..    Past Medical History:   Diagnosis Date    Arthritis     Cerebral aneurysm 10/13/2023    Postablative hypothyroidism 3/28/14    s/p 16 mCi for Grave's disease    Seasonal allergic rhinitis     Stroke (HCC) 10/2023     Past Surgical History:   Procedure Laterality Date    COLONOSCOPY      IR MECHANICAL ART THROMBECTOMY INTRACRANIAL  10/13/2023    IR MECHANICAL ART THROMBECTOMY INTRACRANIAL 10/13/2023 [facility] RAD ANGIO IR    LEFT OOPHORECTOMY Left     REFRACTIVE SURGERY Right     TUBAL LIGATION      WISDOM TOOTH EXTRACTION        Family History   Problem Relation Age of Onset    Thyroid Disease Mother         hypothyroidism    Depression Mother     Hypertension Mother     Dementia Mother     Thyroid Disease Father         hypothyroidism    Cancer Father         bladder    Heart Disease Father     Thyroid Disease Sister         hypothyroidism    Depression Sister     Diabetes Brother     Cancer Brother         tumor on shoulder blade    No Known Problems Maternal Uncle     Heart Disease Paternal Uncle     Migraines Maternal Grandmother     Parkinson's Disease Maternal Grandfather     Anesth Problems Neg Hx      Social History     Tobacco Use    Smoking status: Former     Current packs/day: 0.00     Types: Cigarettes     Quit date: 10/13/2023     Years since quittin.8    Smokeless tobacco: Never   Substance Use Topics    Alcohol use: Not Currently      Current Outpatient Medications   Medication Sig Dispense Refill    TURMERIC PO Take 2 tablets by mouth Daily

## 2024-08-01 ENCOUNTER — TELEPHONE (OUTPATIENT)
Age: 59
End: 2024-08-01

## 2024-08-01 NOTE — TELEPHONE ENCOUNTER
Called patient to let her know that Dr. Diaz signed the urgent request letter from [facility], and we scanned a copy into her Mychart.    Patient acknowledged.

## 2024-08-09 DIAGNOSIS — Z98.890 STATUS POST COIL EMBOLIZATION OF CEREBRAL ANEURYSM: ICD-10-CM

## 2024-08-09 DIAGNOSIS — I60.9 SAH (SUBARACHNOID HEMORRHAGE) (HCC): ICD-10-CM

## 2024-08-09 RX ORDER — CLOPIDOGREL BISULFATE 75 MG/1
75 TABLET ORAL DAILY
Qty: 90 TABLET | Refills: 1 | Status: SHIPPED | OUTPATIENT
Start: 2024-08-09

## 2024-08-23 ENCOUNTER — COMMUNITY OUTREACH (OUTPATIENT)
Age: 59
End: 2024-08-23

## 2024-09-26 ENCOUNTER — TELEMEDICINE (OUTPATIENT)
Age: 59
End: 2024-09-26
Payer: COMMERCIAL

## 2024-09-26 DIAGNOSIS — J45.20 MILD INTERMITTENT ASTHMA WITHOUT COMPLICATION: ICD-10-CM

## 2024-09-26 DIAGNOSIS — U07.1 COVID-19: Primary | ICD-10-CM

## 2024-09-26 PROBLEM — F33.1 MAJOR DEPRESSIVE DISORDER, RECURRENT, MODERATE (HCC): Status: ACTIVE | Noted: 2024-09-26

## 2024-09-26 PROBLEM — F33.1 MAJOR DEPRESSIVE DISORDER, RECURRENT, MODERATE (HCC): Status: RESOLVED | Noted: 2024-09-26 | Resolved: 2024-09-26

## 2024-09-26 PROBLEM — F33.0 MAJOR DEPRESSIVE DISORDER, RECURRENT, MILD (HCC): Status: ACTIVE | Noted: 2024-09-26

## 2024-09-26 PROBLEM — F33.0 MAJOR DEPRESSIVE DISORDER, RECURRENT, MILD (HCC): Status: RESOLVED | Noted: 2024-09-26 | Resolved: 2024-09-26

## 2024-09-26 PROBLEM — F33.9 MAJOR DEPRESSIVE DISORDER, RECURRENT, UNSPECIFIED (HCC): Status: ACTIVE | Noted: 2024-09-26

## 2024-09-26 PROBLEM — F33.9 MAJOR DEPRESSIVE DISORDER, RECURRENT, UNSPECIFIED (HCC): Status: RESOLVED | Noted: 2024-09-26 | Resolved: 2024-09-26

## 2024-09-26 PROCEDURE — 99213 OFFICE O/P EST LOW 20 MIN: CPT | Performed by: FAMILY MEDICINE

## 2024-09-26 RX ORDER — BENZONATATE 200 MG/1
200 CAPSULE ORAL 3 TIMES DAILY PRN
Qty: 30 CAPSULE | Refills: 0 | Status: SHIPPED | OUTPATIENT
Start: 2024-09-26 | End: 2024-10-06

## 2024-09-26 RX ORDER — ALBUTEROL SULFATE 90 UG/1
2 INHALANT RESPIRATORY (INHALATION) EVERY 4 HOURS PRN
Qty: 54 G | Refills: 5 | Status: SHIPPED | OUTPATIENT
Start: 2024-09-26

## 2024-10-17 ENCOUNTER — ANESTHESIA EVENT (OUTPATIENT)
Facility: HOSPITAL | Age: 59
End: 2024-10-17
Payer: COMMERCIAL

## 2024-10-17 RX ORDER — FOLIC ACID 1 MG/1
1 TABLET ORAL DAILY
COMMUNITY

## 2024-10-17 RX ORDER — MAGNESIUM 200 MG
200 TABLET ORAL DAILY
COMMUNITY

## 2024-10-17 RX ORDER — VIT C/HESPERIDIN/BIOFLAVONOIDS 500-100 MG
TABLET ORAL DAILY
COMMUNITY

## 2024-10-17 RX ORDER — METHOTREXATE 2.5 MG/1
2.5 TABLET ORAL WEEKLY
COMMUNITY

## 2024-10-17 NOTE — ANESTHESIA PRE PROCEDURE
0.00     Types: Cigarettes     Quit date: 10/13/2023     Years since quittin.0   • Smokeless tobacco: Never   Substance Use Topics   • Alcohol use: Not Currently                                Counseling given: Not Answered      Vital Signs (Current): There were no vitals filed for this visit.                                           BP Readings from Last 3 Encounters:   24 118/72   24 (!) 87/58   24 104/68       NPO Status:                                                                                 BMI:   Wt Readings from Last 3 Encounters:   24 74 kg (163 lb 3.2 oz)   24 72 kg (158 lb 11.2 oz)   24 72.9 kg (160 lb 12.8 oz)     There is no height or weight on file to calculate BMI.    CBC:   Lab Results   Component Value Date/Time    WBC 9.6 2024 03:21 AM    RBC 3.63 2024 03:21 AM    HGB 11.4 2024 03:21 AM    HCT 34.5 2024 03:21 AM    MCV 95.0 2024 03:21 AM    RDW 12.5 2024 03:21 AM     2024 03:21 AM       CMP:   Lab Results   Component Value Date/Time     2024 08:44 AM    K 3.5 2024 08:44 AM     2024 08:44 AM    CO2 21 2024 08:44 AM    BUN 22 2024 08:44 AM    CREATININE 1.13 2024 08:44 AM    GFRAA 80 2020 04:23 PM    AGRATIO 1.8 04/15/2024 04:37 PM    LABGLOM 56 2024 08:44 AM    LABGLOM 60 04/15/2024 04:37 PM    GLUCOSE 142 2024 08:44 AM    GLUCOSE 85 04/15/2024 04:37 PM    CALCIUM 8.6 2024 08:44 AM    BILITOT 0.2 2024 03:21 AM    ALKPHOS 69 2024 03:21 AM    ALKPHOS 75 04/15/2024 04:37 PM    AST 35 2024 03:21 AM    ALT 44 2024 03:21 AM       POC Tests: No results for input(s): \"POCGLU\", \"POCNA\", \"POCK\", \"POCCL\", \"POCBUN\", \"POCHEMO\", \"POCHCT\" in the last 72 hours.    Coags: No results found for: \"PROTIME\", \"INR\", \"APTT\"    HCG (If Applicable): No results found for: \"PREGTESTUR\", \"PREGSERUM\", \"HCG\", \"HCGQUANT\"     ABGs: No

## 2024-10-18 ENCOUNTER — HOSPITAL ENCOUNTER (OUTPATIENT)
Facility: HOSPITAL | Age: 59
Setting detail: OUTPATIENT SURGERY
Discharge: HOME | End: 2024-10-18
Attending: SURGERY | Admitting: SURGERY
Payer: COMMERCIAL

## 2024-10-18 ENCOUNTER — ANESTHESIA (OUTPATIENT)
Facility: HOSPITAL | Age: 59
End: 2024-10-18
Payer: COMMERCIAL

## 2024-10-18 VITALS
WEIGHT: 163.9 LBS | HEIGHT: 65 IN | BODY MASS INDEX: 27.31 KG/M2 | OXYGEN SATURATION: 96 % | SYSTOLIC BLOOD PRESSURE: 98 MMHG | TEMPERATURE: 98 F | HEART RATE: 71 BPM | RESPIRATION RATE: 12 BRPM | DIASTOLIC BLOOD PRESSURE: 63 MMHG

## 2024-10-18 PROCEDURE — 2709999900 HC NON-CHARGEABLE SUPPLY: Performed by: SURGERY

## 2024-10-18 PROCEDURE — 3600007512: Performed by: SURGERY

## 2024-10-18 PROCEDURE — 7100000010 HC PHASE II RECOVERY - FIRST 15 MIN: Performed by: SURGERY

## 2024-10-18 PROCEDURE — 3700000000 HC ANESTHESIA ATTENDED CARE: Performed by: SURGERY

## 2024-10-18 PROCEDURE — 3600007502: Performed by: SURGERY

## 2024-10-18 PROCEDURE — 6360000002 HC RX W HCPCS: Performed by: NURSE ANESTHETIST, CERTIFIED REGISTERED

## 2024-10-18 PROCEDURE — 2580000003 HC RX 258: Performed by: SURGERY

## 2024-10-18 PROCEDURE — 2500000003 HC RX 250 WO HCPCS: Performed by: NURSE ANESTHETIST, CERTIFIED REGISTERED

## 2024-10-18 PROCEDURE — 7100000011 HC PHASE II RECOVERY - ADDTL 15 MIN: Performed by: SURGERY

## 2024-10-18 PROCEDURE — 3700000001 HC ADD 15 MINUTES (ANESTHESIA): Performed by: SURGERY

## 2024-10-18 PROCEDURE — 88305 TISSUE EXAM BY PATHOLOGIST: CPT

## 2024-10-18 RX ORDER — SODIUM CHLORIDE 0.9 % (FLUSH) 0.9 %
5-40 SYRINGE (ML) INJECTION EVERY 12 HOURS SCHEDULED
Status: DISCONTINUED | OUTPATIENT
Start: 2024-10-18 | End: 2024-10-18 | Stop reason: HOSPADM

## 2024-10-18 RX ORDER — LIDOCAINE HYDROCHLORIDE 20 MG/ML
INJECTION, SOLUTION EPIDURAL; INFILTRATION; INTRACAUDAL; PERINEURAL
Status: DISCONTINUED | OUTPATIENT
Start: 2024-10-18 | End: 2024-10-18 | Stop reason: SDUPTHER

## 2024-10-18 RX ORDER — SODIUM CHLORIDE 0.9 % (FLUSH) 0.9 %
5-40 SYRINGE (ML) INJECTION PRN
Status: DISCONTINUED | OUTPATIENT
Start: 2024-10-18 | End: 2024-10-18 | Stop reason: HOSPADM

## 2024-10-18 RX ORDER — SODIUM CHLORIDE 9 MG/ML
INJECTION, SOLUTION INTRAVENOUS PRN
Status: DISCONTINUED | OUTPATIENT
Start: 2024-10-18 | End: 2024-10-18 | Stop reason: HOSPADM

## 2024-10-18 RX ADMIN — LIDOCAINE HYDROCHLORIDE 60 MG: 20 INJECTION, SOLUTION EPIDURAL; INFILTRATION; INTRACAUDAL; PERINEURAL at 07:42

## 2024-10-18 RX ADMIN — PROPOFOL 25 MG: 10 INJECTION, EMULSION INTRAVENOUS at 07:47

## 2024-10-18 RX ADMIN — PROPOFOL 25 MG: 10 INJECTION, EMULSION INTRAVENOUS at 07:51

## 2024-10-18 RX ADMIN — SODIUM CHLORIDE: 9 INJECTION, SOLUTION INTRAVENOUS at 07:42

## 2024-10-18 RX ADMIN — PROPOFOL 15 MG: 10 INJECTION, EMULSION INTRAVENOUS at 07:58

## 2024-10-18 RX ADMIN — PROPOFOL 25 MG: 10 INJECTION, EMULSION INTRAVENOUS at 07:55

## 2024-10-18 RX ADMIN — PROPOFOL 100 MG: 10 INJECTION, EMULSION INTRAVENOUS at 07:42

## 2024-10-18 ASSESSMENT — PAIN - FUNCTIONAL ASSESSMENT: PAIN_FUNCTIONAL_ASSESSMENT: NONE - DENIES PAIN

## 2024-10-18 NOTE — DISCHARGE INSTRUCTIONS
Liset Vargas  [id number]  1965    COLON DISCHARGE INSTRUCTIONS  Discomfort:  Redness at IV site- apply warm compress to area; if redness or soreness persist- contact your physician  There may be a slight amount of blood passed from the rectum  Gaseous discomfort- walking, belching will help relieve any discomfort  You may not operate a vehicle for 12 hours  You may not engage in an occupation involving machinery or appliances for rest of today  You may not drink alcoholic beverages for at least 12 hours  Avoid making any critical decisions for at least 24 hour  DIET:   Regular diet.   - however -  remember your colon is empty and a heavy meal will produce gas.   Avoid these foods:  vegetables, fried / greasy foods, carbonated drinks for today    MEDICATIONS:  [unfilled]     ACTIVITY:  You may resume your normal daily activities it is recommended that you spend the remainder of the day resting -  avoid any strenuous activity.    CALL M.D.  ANY SIGN OF:   Increasing pain, nausea, vomiting  Abdominal distension (swelling)  New increased bleeding (oral or rectal)  Fever (chills)  Pain in chest area  Bloody discharge from nose or mouth  Shortness of breath     Follow-up Instructions:   Call Ramon Azul MD if any questions or problems.   Telephone # [phone]  Biopsy results will be available in  7 to10 days  Should have a repeat colonoscopy in 5 years.    COLONOSCOPY FINDINGS:  Your colonoscopy showed: 3 polyps (removed).  Patient Education on Sedation / Analgesia Administered for Procedure      For 24 hours after general anesthesia or intravenous analgesia / sedation:  Have someone responsible help you with your care  Limit your activities  Do not drive and operate hazardous machinery  Do not make important personal, legal or business decisions  Do not drink alcoholic beverages  If you have not urinated within 8 hours after discharge, please contact your physician  Resume your medications unless  [facility] [employer]   Care instructions adapted under license by [facility]. If you have questions about a medical condition or this instruction, always ask your healthcare professional. [employer] disclaims any warranty or liability for your use of this information.

## 2024-10-18 NOTE — ANESTHESIA POSTPROCEDURE EVALUATION
Department of Anesthesiology  Postprocedure Note    Patient: Liset Vargas  MRN: [id number]  YOB: 1965  Date of evaluation: 10/18/2024    Procedure Summary       Date: 10/18/24 Room / [facility] END[facility]COPY    Anesthesia Start: 0739 Anesthesia Stop: 0804    Procedure: COLONOSCOPY (Lower GI Region) Diagnosis:       Personal history of colonic polyps      (Personal history of colonic polyps [Z86.0100])    Surgeons: Ramon Azul II, MD Responsible Provider: FAHEEM Mora MD    Anesthesia Type: TIVA ASA Status: 3            Anesthesia Type: No value filed.    Lucy Phase I: Lucy Score: 10    Lucy Phase II: Lucy Score: 10    Anesthesia Post Evaluation    Patient location during evaluation: bedside  Patient participation: complete - patient participated  Level of consciousness: responsive to verbal stimuli and awake and alert  Pain score: 2  Nausea & Vomiting: no nausea  Cardiovascular status: blood pressure returned to baseline  Respiratory status: acceptable  Hydration status: euvolemic  Multimodal analgesia pain management approach  Pain management: adequate    No notable events documented.

## 2024-10-18 NOTE — PROGRESS NOTES
ARRIVAL INFORMATION:  Verified patient name and date of birth, scheduled procedure, and informed consent.     : Avila, , contact number: [phone]  Physician and staff can share information with the .     Receive texts: yes    Belongings with patient include:  Clothing,Glasses    GI FOCUSED ASSESSMENT:  Neuro: Awake, alert, oriented x4  Respiratory: even and unlabored   GI: soft and non-distended  EKG Rhythm: normal sinus rhythm    Education:Reviewed general discharge instructions and  information.

## 2024-10-18 NOTE — H&P
History and Physical    Patient: Liset Vargas MRN: [id number]  SSN: [id number]    YOB: 1965  Age: 58 y.o.  Sex: female      Subjective:      Liset Vargas is a 58 y.o. female who presents for colonoscopy.     Past Medical History:   Diagnosis Date    Arthritis     Cerebral aneurysm 10/13/2023    Postablative hypothyroidism 3/28/14    s/p 16 mCi for Grave's disease    Rheumatoid arthritis (HCC)     Seasonal allergic rhinitis     Stroke (HCC) 10/2023     Past Surgical History:   Procedure Laterality Date    COLONOSCOPY      IR MECHANICAL ART THROMBECTOMY INTRACRANIAL  10/13/2023    IR MECHANICAL ART THROMBECTOMY INTRACRANIAL 10/13/2023 [facility] RAD ANGIO IR    LEFT OOPHORECTOMY Left     REFRACTIVE SURGERY Right     TUBAL LIGATION      WISDOM TOOTH EXTRACTION        Family History   Problem Relation Age of Onset    Thyroid Disease Mother         hypothyroidism    Depression Mother     Hypertension Mother     Dementia Mother     Thyroid Disease Father         hypothyroidism    Cancer Father         bladder    Heart Disease Father     Thyroid Disease Sister         hypothyroidism    Depression Sister     Diabetes Brother     Cancer Brother         tumor on shoulder blade    No Known Problems Maternal Uncle     Heart Disease Paternal Uncle     Migraines Maternal Grandmother     Parkinson's Disease Maternal Grandfather     Anesth Problems Neg Hx      Social History     Tobacco Use    Smoking status: Former     Current packs/day: 0.00     Types: Cigarettes     Quit date: 10/13/2023     Years since quittin.0    Smokeless tobacco: Never   Substance Use Topics    Alcohol use: Not Currently      Prior to Admission medications    Medication Sig Start Date End Date Taking? Authorizing Provider   folic acid (FOLVITE) 1 MG tablet Take 1 tablet by mouth daily   Yes Provider, MD Nikunj   methotrexate (RHEUMATREX) 2.5 MG chemo tablet Take 1 tablet by mouth once a week 8 tablets takes on  -   tabs in am and 4 tabs in pm.   Yes Nikunj Bernal MD   Zinc 30 MG TABS Take by mouth daily   Yes Nikunj Bernal MD   magnesium 200 MG TABS tablet Take 1 tablet by mouth daily   Yes Nikunj Bernal MD   Multiple Vitamins-Minerals (HAIR SKIN & NAILS ADVANCED PO) Take by mouth daily   Yes Nikunj Bernal MD   Multiple Vitamins-Minerals (WOMENS 50+ MULTI VITAMIN/MIN PO) Take by mouth daily   Yes Nikunj Bernal MD   albuterol sulfate HFA (VENTOLIN HFA) 108 (90 Base) MCG/ACT inhaler Inhale 2 puffs into the lungs every 4 hours as needed for Wheezing 9/26/24  Yes Javi Valiente MD   senna (SENOKOT) 8.6 MG tablet Take 2 tablets by mouth daily   Yes Nikunj Bernal MD   aspirin (EDWARD ASPIRIN) 325 MG tablet Take 1 tablet by mouth daily 5/15/24  Yes Maciel Diaz MD   LEVOXYL 100 MCG tablet Take 1 tablet by mouth Daily BRAND MEDICALLY NECESSARY--Delete 125 mcg dose from profile 1/12/24  Yes Mt Mendez MD   buPROPion (WELLBUTRIN XL) 300 MG extended release tablet TAKE 1 TABLET BY MOUTH EVERY DAY IN THE MORNING 12/15/23  Yes Mt Mendez MD   PROGESTERONE PO Take 300 mg by mouth every evening   Yes Nikunj Bernal MD   Pregnenolone 50 MG TABS Take 50 mg by mouth every evening   Yes Nikunj Bernal MD   Sennosides (EX-LAX PO) Take 25 mg by mouth as needed   Yes Nikunj Bernal MD   Cholecalciferol (VITAMIN D3) 50 MCG (2000 UT) CAPS Take 1 capsule by mouth daily   Yes Nikunj Bernal MD   Probiotic Product (UP4 PROBIOTICS PO) Take 220 mg by mouth daily   Yes Nikunj Bernal MD   liothyronine (CYTOMEL) 5 MCG tablet TAKE 1 TAB DAILY WITH LEVOXYL  Patient taking differently: Take 1 tablet by mouth daily PT TAKES W/ LEVOXYL 11/7/23  Yes Mt Mendez MD   Biotin 10 MG CAPS Take by mouth daily   Yes Automatic Reconciliation, Ar   Cetirizine HCl 10 MG CAPS Take 1 capsule by mouth daily as needed   Yes Automatic Reconciliation, Ar   clopidogrel

## 2024-10-18 NOTE — ANESTHESIA POSTPROCEDURE EVALUATION
Department of Anesthesiology  Postprocedure Note    Patient: Liset Vargas  MRN: [id number]  YOB: 1965  Date of evaluation: 10/18/2024    Procedure Summary       Date: 10/18/24 Room / Location: [facility] ENDO 03 / [facility] END[facility]COPY    Anesthesia Start: 0739 Anesthesia Stop: 0804    Procedure: COLONOSCOPY (Lower GI Region) Diagnosis:       Personal history of colonic polyps      (Personal history of colonic polyps [Z86.0100])    Surgeons: Ramon Azul II, MD Responsible Provider: FAHEEM Mora MD    Anesthesia Type: TIVA ASA Status: 3            Anesthesia Type: No value filed.    Lucy Phase I: Lucy Score: 10    Lucy Phase II: Lucy Score: 10    Anesthesia Post Evaluation    Patient location during evaluation: bedside  Patient participation: complete - patient participated  Level of consciousness: responsive to verbal stimuli and awake and alert  Pain score: 2  Nausea & Vomiting: no nausea  Cardiovascular status: blood pressure returned to baseline  Respiratory status: acceptable  Hydration status: euvolemic  Multimodal analgesia pain management approach  Pain management: adequate        No notable events documented.

## 2024-10-18 NOTE — PROCEDURES
Colonoscopy Procedure Note    Liset Vargas  1965  [id number]    Indications:    Personal history of colon polyps (screening only), Screening colonoscopy     Pre-operative Diagnosis: Personal history of colonic polyps [Z86.0100]    Post-operative Diagnosis: Polyps x2    : Ramon Azul MD    Referring Provider: Adrien Conklin MD    Sedation:  MAC anesthesia Propofol    Procedure Details:    After informed consent was obtained with all risks and benefits of procedure explained and preoperative exam completed, the patient was taken to the endoscopy suite and placed in the left lateral decubitus position.  Upon sequential sedation as per above, a digital rectal exam was performed  And was normal.  The Olympus videocolonoscope  was inserted in the rectum and carefully advanced to the cecum, which was identified by the ileocecal valve and appendiceal orifice.  The quality of preparation was good.  The colonoscope was slowly withdrawn with careful evaluation between folds. Retroflexion in the rectum was performed and was normal..     Findings:   Rectum: no mucosal lesion appreciated  Sigmoid: no mucosal lesion appreciated  Descending Colon: no mucosal lesion appreciated  Transverse Colon: 3 polyps removed and retrieved with hot snare  Ascending Colon: no mucosal lesion appreciated  Cecum: no mucosal lesion appreciated  Terminal Ileum: no mucosal lesion appreciated    Therapies:  Polypectomy x3    Specimen:   Polyps x3    Complications: None.     EBL:  None    Recommendations: -Repeat colonoscopy in 5 years.      Ramon Azul MD  10/18/2024  8:02 AM

## 2024-10-28 ENCOUNTER — HOSPITAL ENCOUNTER (OUTPATIENT)
Facility: HOSPITAL | Age: 59
Discharge: HOME | End: 2024-10-31
Attending: RADIOLOGY
Payer: COMMERCIAL

## 2024-10-28 DIAGNOSIS — I60.9 SAH (SUBARACHNOID HEMORRHAGE) (HCC): ICD-10-CM

## 2024-10-28 PROCEDURE — 70544 MR ANGIOGRAPHY HEAD W/O DYE: CPT

## 2024-11-01 RX ORDER — LIOTHYRONINE SODIUM 5 UG/1
TABLET ORAL
Qty: 90 TABLET | Refills: 3 | Status: SHIPPED | OUTPATIENT
Start: 2024-11-01

## 2024-11-29 RX ORDER — BUPROPION HYDROCHLORIDE 300 MG/1
300 TABLET ORAL EVERY MORNING
Qty: 90 TABLET | Refills: 3 | Status: SHIPPED | OUTPATIENT
Start: 2024-11-29

## 2024-12-16 ENCOUNTER — OFFICE VISIT (OUTPATIENT)
Age: 59
End: 2024-12-16
Payer: COMMERCIAL

## 2024-12-16 VITALS
OXYGEN SATURATION: 98 % | DIASTOLIC BLOOD PRESSURE: 70 MMHG | HEART RATE: 71 BPM | HEIGHT: 65 IN | SYSTOLIC BLOOD PRESSURE: 110 MMHG | WEIGHT: 165 LBS | BODY MASS INDEX: 27.49 KG/M2 | TEMPERATURE: 97.2 F

## 2024-12-16 DIAGNOSIS — I67.1 CEREBRAL ANEURYSM: Primary | ICD-10-CM

## 2024-12-16 PROCEDURE — 99213 OFFICE O/P EST LOW 20 MIN: CPT | Performed by: NURSE PRACTITIONER

## 2024-12-16 NOTE — PATIENT INSTRUCTIONS
Follow up in October 2025, after imaging is completed.  See attached paperwork to schedule imaging.

## 2024-12-16 NOTE — PROGRESS NOTES
Follow up for Cerebral aneurysm and imaging results.  Patient reports no symptoms.  Denies headaches, dizziness, numbness or tingling, blurred or double vision.  No new problems reported.  
10 MG CAPS Take 1 capsule by mouth daily as needed       No current facility-administered medications for this visit.        Allergies   Allergen Reactions    Cephalexin Rash    Erythromycin Rash    Thyroid Rash     With 60 mg tabs    ARMOUR THYROID MEDICATION       Review of Systems:  Pertinent items are noted in the History of Present Illness.    Objective:     Vitals:    12/16/24 0942   BP: 110/70   Pulse: 71   Temp: 97.2 °F (36.2 °C)   SpO2: 98%        Physical Exam:  GENERAL: alert, cooperative, no distress, appears stated age  EYE: negative  EXTREMITIES:  extremities normal, atraumatic, no cyanosis or edema    Neurologic Exam:  Mental Status:  Alert and oriented x 4.  Appropriate affect, mood and behavior.       Language:    Normal fluency, repetition, comprehension and naming.    Cranial Nerves:   Pupils equal, round and reactive to light.     Visual fields full to confrontation.     Extraocular movements intact.       Facial sensation intact V1 - V3.     Full facial strength, no asymmetry.      Hearing intact bilaterally.     No dysarthria. Tongue protrudes to midline, palate elevates symmetrically.      Shoulder shrug 5/5 bilaterally.    Motor:    No pronator drift.      Bulk and tone normal.      5/5 power in all extremities proximally and distally.     No involuntary movements.    Sensation:    Sensation intact throughout to light touch    Reflexes:    Deferred    Coordination & Gait: Normal      Imaging:  I personally reviewed the following imaging studies. The impressions listed below are those of the interpreting radiologist(s).    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    Assessment:     Liset Vargas is a 59 y.o. 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

## 2024-12-29 RX ORDER — LEVOTHYROXINE SODIUM 100 UG/1
100 TABLET ORAL DAILY
Qty: 90 TABLET | Refills: 3 | Status: SHIPPED | OUTPATIENT
Start: 2024-12-29

## 2025-01-29 DIAGNOSIS — I60.9 SAH (SUBARACHNOID HEMORRHAGE) (HCC): ICD-10-CM

## 2025-01-29 DIAGNOSIS — Z98.890 STATUS POST COIL EMBOLIZATION OF CEREBRAL ANEURYSM: ICD-10-CM

## 2025-01-30 RX ORDER — CLOPIDOGREL BISULFATE 75 MG/1
75 TABLET ORAL DAILY
Qty: 90 TABLET | Refills: 1 | OUTPATIENT
Start: 2025-01-30

## 2025-05-06 ENCOUNTER — OFFICE VISIT (OUTPATIENT)
Age: 60
End: 2025-05-06
Payer: COMMERCIAL

## 2025-05-06 VITALS
OXYGEN SATURATION: 95 % | WEIGHT: 170 LBS | HEIGHT: 65 IN | HEART RATE: 69 BPM | RESPIRATION RATE: 19 BRPM | BODY MASS INDEX: 28.32 KG/M2 | DIASTOLIC BLOOD PRESSURE: 72 MMHG | TEMPERATURE: 97.5 F | SYSTOLIC BLOOD PRESSURE: 118 MMHG

## 2025-05-06 DIAGNOSIS — R79.89 ABNORMAL PLATELET COUNT: Primary | ICD-10-CM

## 2025-05-06 DIAGNOSIS — N18.2 STAGE 2 CHRONIC KIDNEY DISEASE: ICD-10-CM

## 2025-05-06 DIAGNOSIS — S06.5XAA SUBDURAL HEMATOMA (HCC): ICD-10-CM

## 2025-05-06 PROCEDURE — 99214 OFFICE O/P EST MOD 30 MIN: CPT | Performed by: FAMILY MEDICINE

## 2025-05-06 RX ORDER — CALCIUM CARBONATE/VITAMIN D3 500-10/5ML
LIQUID (ML) ORAL
COMMUNITY
End: 2025-05-06 | Stop reason: CLARIF

## 2025-05-06 RX ORDER — BACILLUS COAGULANS/INULIN 1B-250 MG
CAPSULE ORAL
COMMUNITY

## 2025-05-06 RX ORDER — MULTIVIT WITH MINERALS/LUTEIN
TABLET ORAL
COMMUNITY

## 2025-05-06 SDOH — ECONOMIC STABILITY: FOOD INSECURITY: WITHIN THE PAST 12 MONTHS, YOU WORRIED THAT YOUR FOOD WOULD RUN OUT BEFORE YOU GOT MONEY TO BUY MORE.: NEVER TRUE

## 2025-05-06 SDOH — ECONOMIC STABILITY: FOOD INSECURITY: WITHIN THE PAST 12 MONTHS, THE FOOD YOU BOUGHT JUST DIDN'T LAST AND YOU DIDN'T HAVE MONEY TO GET MORE.: NEVER TRUE

## 2025-05-06 ASSESSMENT — PATIENT HEALTH QUESTIONNAIRE - PHQ9
1. LITTLE INTEREST OR PLEASURE IN DOING THINGS: NOT AT ALL
SUM OF ALL RESPONSES TO PHQ QUESTIONS 1-9: 0
2. FEELING DOWN, DEPRESSED OR HOPELESS: NOT AT ALL
SUM OF ALL RESPONSES TO PHQ QUESTIONS 1-9: 0

## 2025-05-07 ENCOUNTER — RESULTS FOLLOW-UP (OUTPATIENT)
Age: 60
End: 2025-05-07

## 2025-05-07 ENCOUNTER — TELEPHONE (OUTPATIENT)
Age: 60
End: 2025-05-07

## 2025-05-07 DIAGNOSIS — R79.89 ABNORMAL PLATELET COUNT: ICD-10-CM

## 2025-05-07 NOTE — TELEPHONE ENCOUNTER
Left message for pt to call office back. Need patient to come in for a CBC within 3 days from her appt on 05/06.  Dr Conklin added a order for her.

## 2025-05-07 NOTE — TELEPHONE ENCOUNTER
Patient returning nurse's call. Nurse gone for the day, advised would return call tomorrow.   [phone].

## 2025-05-12 LAB
BASOPHILS # BLD AUTO: 0.1 X10E3/UL (ref 0–0.2)
BASOPHILS NFR BLD AUTO: 1 %
EOSINOPHIL # BLD AUTO: 0.2 X10E3/UL (ref 0–0.4)
EOSINOPHIL NFR BLD AUTO: 4 %
ERYTHROCYTE [DISTWIDTH] IN BLOOD BY AUTOMATED COUNT: 13.5 % (ref 11.7–15.4)
HCT VFR BLD AUTO: 38.9 % (ref 34–46.6)
HGB BLD-MCNC: 12.7 G/DL (ref 11.1–15.9)
IMM GRANULOCYTES # BLD AUTO: 0 X10E3/UL (ref 0–0.1)
IMM GRANULOCYTES NFR BLD AUTO: 1 %
LYMPHOCYTES # BLD AUTO: 2 X10E3/UL (ref 0.7–3.1)
LYMPHOCYTES NFR BLD AUTO: 30 %
MCH RBC QN AUTO: 33.4 PG (ref 26.6–33)
MCHC RBC AUTO-ENTMCNC: 32.6 G/DL (ref 31.5–35.7)
MCV RBC AUTO: 102 FL (ref 79–97)
MONOCYTES # BLD AUTO: 0.6 X10E3/UL (ref 0.1–0.9)
MONOCYTES NFR BLD AUTO: 9 %
MORPHOLOGY BLD-IMP: ABNORMAL
NEUTROPHILS # BLD AUTO: 3.7 X10E3/UL (ref 1.4–7)
NEUTROPHILS NFR BLD AUTO: 55 %
PLATELET # BLD AUTO: ABNORMAL X10E3/UL
RBC # BLD AUTO: 3.8 X10E6/UL (ref 3.77–5.28)
WBC # BLD AUTO: 6.6 X10E3/UL (ref 3.4–10.8)

## 2025-05-12 NOTE — PROGRESS NOTES
The patient identity was confirmed with  and First/Last Name. Medications and Allergies reviewed with patient, as well as any new diagnosis/procedures. Depression Screening and SDOH Screening done today.    Chief Complaint   Patient presents with    Kidney Problem     Discuss kidney function.        Vitals:    25 1603   BP: 118/72   Pulse: 69   Resp: 19   Temp: 97.5 °F (36.4 °C)   SpO2: 95%       Health Maintenance Due   Topic Date Due    Shingles vaccine (1 of 2) Never done    Lung Cancer Screening &/or Counseling  Never done    Pneumococcal 50+ years Vaccine (2 of 2 - PCV) 2018    Cervical cancer screen  2022    COVID-19 Vaccine ( season) 2024    Depression Screen  2024          \"Have you been to the ER, urgent care clinic since your last visit?  Hospitalized since your last visit?\"    NO    “Have you seen or consulted any other health care providers outside our system since your last visit?”    NO     “Have you had a pap smear?”    NO    Date of last Cervical Cancer screen (HPV or PAP): 3/24/2017             
activity, and thought processes  Chest - clear to auscultation, no wheezes, rales or rhonchi, symmetric air entry  Heart - normal rate, regular rhythm, normal S1, S2, no murmurs, rubs, clicks or gallops  Musculoskeletal - no joint tenderness, deformity or swelling  Extremities - peripheral pulses normal, no pedal edema, no clubbing or cyanosis    Assessment/ Plan:   1. Abnormal platelet count  -check labs  - Platelet Count for Known Clumping; Future  - CBC with Auto Differential; Future    2. Subdural hematoma (HCC)  -resolved    3. Stage 2 chronic kidney disease  -reviewed kidney function over time, very stable until recently slightly increased  -monitor closely   Assessment & Plan  1. Low kidney function.  - Persistent low kidney function noted since the aneurysm repair in 06/2024.  - Reviewed lab trends; patient reports inconsistent hydration, particularly in the afternoons when labs are drawn.  - Advised to increase water intake consistently throughout the day to improve hydration status before lab tests.    No follow-ups on file.    I have discussed the diagnosis with the patient and the intended plan as seen in the above orders.  The patient has received an after-visit summary and questions were answered concerning future plans.     Medication Side Effects and Warnings were discussed with patient: yes  Patient Labs were reviewed: yes  Patient Past Records were reviewed:  yes    The patient (or guardian, if applicable) and other individuals in attendance with the patient were advised that Artificial Intelligence will be utilized during this visit to record and process the conversation to generate a clinical note. The patient (or guardian, if applicable) and other individuals in attendance at the appointment consented to the use of AI, including the recording.      Adrien Conklin M.D.

## 2025-06-26 DIAGNOSIS — E89.0 POSTABLATIVE HYPOTHYROIDISM: ICD-10-CM

## 2025-06-26 DIAGNOSIS — Z72.0 TOBACCO USE: ICD-10-CM

## 2025-07-08 LAB
T3 SERPL-MCNC: 95 NG/DL (ref 71–180)
T4 FREE SERPL-MCNC: 1.3 NG/DL (ref 0.82–1.77)
TSH SERPL DL<=0.005 MIU/L-ACNC: 0.83 UIU/ML (ref 0.45–4.5)

## 2025-07-14 ENCOUNTER — OFFICE VISIT (OUTPATIENT)
Age: 60
End: 2025-07-14
Payer: COMMERCIAL

## 2025-07-14 VITALS
BODY MASS INDEX: 28.16 KG/M2 | WEIGHT: 169 LBS | HEIGHT: 65 IN | SYSTOLIC BLOOD PRESSURE: 101 MMHG | DIASTOLIC BLOOD PRESSURE: 58 MMHG | HEART RATE: 77 BPM

## 2025-07-14 DIAGNOSIS — E89.0 POSTABLATIVE HYPOTHYROIDISM: Primary | ICD-10-CM

## 2025-07-14 DIAGNOSIS — Z72.0 TOBACCO USE: ICD-10-CM

## 2025-07-14 PROCEDURE — 99214 OFFICE O/P EST MOD 30 MIN: CPT | Performed by: INTERNAL MEDICINE

## 2025-07-14 RX ORDER — CYCLOSPORINE 0.5 MG/ML
EMULSION OPHTHALMIC
COMMUNITY

## 2025-07-14 NOTE — PROGRESS NOTES
so increased to 125 mcg daily and then TSH down to 0.32 in 5/17 so decreased back to 6.5 tabs/week.  TSH normal at 0.65 in 8/17 but changed to armour thyroid at 60 mg 8 tabs/week and then TSH up to 5.45 in 10/17 so increased to 9 tabs/week.  Started developing more of a rash and itching so stopped the armour in 11/17 and symptoms improved and changed back to levoxyl 125 mcg 6.5 tabs/week and TSH 1.38 in 1/18 and is now due for her next pellet insertion.  I think her levels are fluctuating due to when she has labs drawn in relation to her pellet insertion and needs a dose increase after the insertion and then a dose decrease as she is getting closer to the next insertion so tried 125 mcg 7 tabs/week after the insertion and her TSH was 0.09 and FT4 was 2.13 in 2/18 and decreased to 6 tabs/week.  TSH up to 3.47 in 4/18 so went back to 6.5 tabs/week and wt down 15 lbs and TSH 0.69 in 7/18 and 1.56 in 12/18 and 1.28 in 7/19 so kept her dose the same.  TSH 2.27 in 1/20 so increased dose back to 1 tab daily but she never did this and stayed on 6.5 tabs/week and TSH 1.75 in 11/20 and 1.69 in 12/20.  Up to 3.31 in 1/21 for unclear reasons but felt well so kept dose the same.  TSH up to 7.6 in 3/21 and 5.68 in 4/21 and had been on [employer] diet from 1/21 until 5/21 and this has a high soy content that may have increased her thyroid hormone requirements so increased to 7 tabs/week and TSH 5.25 and 6.32 in 5/21 so increased to 8 tabs/week.  TSH 3.12 in 7/21 so increased to 150 mcg daily and TSH 1.88 in 8/21 so kept dose the same.  TSH down to 0.76 in 11/21 so kept dose the same.  TSH down to 0.43 and FT4 up to 2.05 in 1/22 so decreased to 6.5 tabs/week.  TSH 1.33 in 7/22 so kept dose the same.  TSH 0.94 but T3 low at 64 in 12/22 so decreased levoxyl to 125 mcg daily and began cytomel 5 mcg daily and TSH 0.23 in 2/23 so decreased levoxyl and cytomel to 6.5 tabs/week and TSH 0.425 in 6/23 so kept dose the same.  TSH down to 0.269

## 2025-07-14 NOTE — PATIENT INSTRUCTIONS
1) Your TSH (thyroid test) is perfect so I will keep your dose the same of levoxyl and cytomel.    2) I will plan on seeing you back in one year but if you feel you need to have your labs checked sooner, please let me know.

## (undated) DEVICE — ELECTRODE PT RET AD L9FT HI MOIST COND ADH HYDRGEL CORDED

## (undated) DEVICE — ENDOSCOPIC KIT COMPLIANCE ENDOKIT

## (undated) DEVICE — TRAP ENDOSCP POLYP 2 CHMBR DRAWER TYP

## (undated) DEVICE — TIP SUCT TRNSPAR RIB SURF STD BLB RIG NVENT W/ 5IN1 CONN DYND50138] MEDLINE INDUSTRIES INC]

## (undated) DEVICE — SET GRAV CK VLV NEEDLESS ST 3 GANGED 4WAY STPCOCK HI FLO 10

## (undated) DEVICE — CUFF BLD PRSS AD CLTH SGL TB W/ BAYNT CONN ROUNDED CORNER

## (undated) DEVICE — SNARE ENDOSCP M L240CM W27MM SHTH DIA2.4MM CHN 2.8MM OVL

## (undated) DEVICE — CONTAINER SPEC 20 ML LID NEUT BUFF FORMALIN 10 % POLYPR STS

## (undated) DEVICE — IV START KIT: Brand: MEDLINE